# Patient Record
Sex: FEMALE | Race: BLACK OR AFRICAN AMERICAN | Employment: OTHER | URBAN - METROPOLITAN AREA
[De-identification: names, ages, dates, MRNs, and addresses within clinical notes are randomized per-mention and may not be internally consistent; named-entity substitution may affect disease eponyms.]

---

## 2022-01-15 ENCOUNTER — HOSPITAL ENCOUNTER (EMERGENCY)
Age: 62
Discharge: HOME | End: 2022-01-15
Attending: EMERGENCY MEDICINE
Payer: MEDICARE

## 2022-01-15 ENCOUNTER — APPOINTMENT (OUTPATIENT)
Dept: CT IMAGING | Age: 62
End: 2022-01-15
Payer: MEDICARE

## 2022-01-15 ENCOUNTER — APPOINTMENT (OUTPATIENT)
Dept: GENERAL RADIOLOGY | Age: 62
End: 2022-01-15
Payer: MEDICARE

## 2022-01-15 VITALS
BODY MASS INDEX: 43.4 KG/M2 | SYSTOLIC BLOOD PRESSURE: 133 MMHG | HEIGHT: 69 IN | OXYGEN SATURATION: 96 % | WEIGHT: 293 LBS | TEMPERATURE: 98.5 F | DIASTOLIC BLOOD PRESSURE: 72 MMHG | HEART RATE: 81 BPM | RESPIRATION RATE: 22 BRPM

## 2022-01-15 DIAGNOSIS — J18.9 PNEUMONIA OF RIGHT LOWER LOBE DUE TO INFECTIOUS ORGANISM: Primary | ICD-10-CM

## 2022-01-15 LAB
ABSOLUTE EOS #: 0 K/UL (ref 0–0.4)
ABSOLUTE IMMATURE GRANULOCYTE: 0.09 K/UL (ref 0–0.3)
ABSOLUTE LYMPH #: 1.03 K/UL (ref 1–4.8)
ABSOLUTE MONO #: 0.43 K/UL (ref 0.2–0.8)
ALBUMIN SERPL-MCNC: 3.8 G/DL (ref 3.5–5.2)
ALBUMIN/GLOBULIN RATIO: ABNORMAL (ref 1–2.5)
ALP BLD-CCNC: 81 U/L (ref 35–104)
ALT SERPL-CCNC: 14 U/L (ref 5–33)
ANION GAP SERPL CALCULATED.3IONS-SCNC: 15 MMOL/L (ref 9–17)
AST SERPL-CCNC: 27 U/L
ATYPICAL LYMPHOCYTE ABSOLUTE COUNT: 0.13 K/UL
ATYPICAL LYMPHOCYTES: 3 %
BASOPHILS # BLD: 0 %
BASOPHILS ABSOLUTE: 0 K/UL (ref 0–0.2)
BILIRUB SERPL-MCNC: 1.4 MG/DL (ref 0.3–1.2)
BUN BLDV-MCNC: 10 MG/DL (ref 8–23)
BUN/CREAT BLD: 13 (ref 9–20)
CALCIUM SERPL-MCNC: 8.9 MG/DL (ref 8.6–10.4)
CHLORIDE BLD-SCNC: 98 MMOL/L (ref 98–107)
CO2: 23 MMOL/L (ref 20–31)
CREAT SERPL-MCNC: 0.76 MG/DL (ref 0.5–0.9)
D-DIMER QUANTITATIVE: 1.36 MG/L FEU (ref 0–0.59)
DIFFERENTIAL TYPE: ABNORMAL
EOSINOPHILS RELATIVE PERCENT: 0 % (ref 1–4)
GFR AFRICAN AMERICAN: >60 ML/MIN
GFR NON-AFRICAN AMERICAN: >60 ML/MIN
GFR SERPL CREATININE-BSD FRML MDRD: ABNORMAL ML/MIN/{1.73_M2}
GFR SERPL CREATININE-BSD FRML MDRD: ABNORMAL ML/MIN/{1.73_M2}
GLUCOSE BLD-MCNC: 107 MG/DL (ref 70–99)
HCT VFR BLD CALC: 40.9 % (ref 36.3–47.1)
HEMOGLOBIN: 13.4 G/DL (ref 11.9–15.1)
IMMATURE GRANULOCYTES: 2 %
LIPASE: 15 U/L (ref 13–60)
LYMPHOCYTES # BLD: 24 % (ref 24–44)
MCH RBC QN AUTO: 28.8 PG (ref 25.2–33.5)
MCHC RBC AUTO-ENTMCNC: 32.8 G/DL (ref 28.4–34.8)
MCV RBC AUTO: 88 FL (ref 82.6–102.9)
MONOCYTES # BLD: 10 % (ref 1–7)
MORPHOLOGY: ABNORMAL
NRBC AUTOMATED: 0 PER 100 WBC
PDW BLD-RTO: 13.8 % (ref 11.8–14.4)
PLATELET # BLD: 201 K/UL (ref 138–453)
PLATELET ESTIMATE: ABNORMAL
PMV BLD AUTO: 9.6 FL (ref 8.1–13.5)
POTASSIUM SERPL-SCNC: 3.6 MMOL/L (ref 3.7–5.3)
RBC # BLD: 4.65 M/UL (ref 3.95–5.11)
RBC # BLD: ABNORMAL 10*6/UL
SEG NEUTROPHILS: 61 % (ref 36–66)
SEGMENTED NEUTROPHILS ABSOLUTE COUNT: 2.62 K/UL (ref 1.8–7.7)
SODIUM BLD-SCNC: 136 MMOL/L (ref 135–144)
TOTAL PROTEIN: 7.9 G/DL (ref 6.4–8.3)
TROPONIN INTERP: NORMAL
TROPONIN T: NORMAL NG/ML
TROPONIN, HIGH SENSITIVITY: 9 NG/L (ref 0–14)
WBC # BLD: 4.3 K/UL (ref 3.5–11.3)
WBC # BLD: ABNORMAL 10*3/UL

## 2022-01-15 PROCEDURE — 80053 COMPREHEN METABOLIC PANEL: CPT

## 2022-01-15 PROCEDURE — 84484 ASSAY OF TROPONIN QUANT: CPT

## 2022-01-15 PROCEDURE — 71045 X-RAY EXAM CHEST 1 VIEW: CPT

## 2022-01-15 PROCEDURE — 83690 ASSAY OF LIPASE: CPT

## 2022-01-15 PROCEDURE — 71260 CT THORAX DX C+: CPT

## 2022-01-15 PROCEDURE — 85379 FIBRIN DEGRADATION QUANT: CPT

## 2022-01-15 PROCEDURE — 99283 EMERGENCY DEPT VISIT LOW MDM: CPT

## 2022-01-15 PROCEDURE — 6360000002 HC RX W HCPCS: Performed by: PHYSICIAN ASSISTANT

## 2022-01-15 PROCEDURE — 73110 X-RAY EXAM OF WRIST: CPT

## 2022-01-15 PROCEDURE — 2580000003 HC RX 258: Performed by: PHYSICIAN ASSISTANT

## 2022-01-15 PROCEDURE — 96374 THER/PROPH/DIAG INJ IV PUSH: CPT

## 2022-01-15 PROCEDURE — 96375 TX/PRO/DX INJ NEW DRUG ADDON: CPT

## 2022-01-15 PROCEDURE — 6360000004 HC RX CONTRAST MEDICATION: Performed by: PHYSICIAN ASSISTANT

## 2022-01-15 PROCEDURE — 93005 ELECTROCARDIOGRAM TRACING: CPT | Performed by: PHYSICIAN ASSISTANT

## 2022-01-15 PROCEDURE — 85025 COMPLETE CBC W/AUTO DIFF WBC: CPT

## 2022-01-15 PROCEDURE — 96361 HYDRATE IV INFUSION ADD-ON: CPT

## 2022-01-15 RX ORDER — 0.9 % SODIUM CHLORIDE 0.9 %
1000 INTRAVENOUS SOLUTION INTRAVENOUS ONCE
Status: COMPLETED | OUTPATIENT
Start: 2022-01-15 | End: 2022-01-15

## 2022-01-15 RX ORDER — AZITHROMYCIN 250 MG/1
TABLET, FILM COATED ORAL
Qty: 1 PACKET | Refills: 0 | Status: SHIPPED | OUTPATIENT
Start: 2022-01-15 | End: 2022-01-18 | Stop reason: ALTCHOICE

## 2022-01-15 RX ORDER — ONDANSETRON 4 MG/1
4 TABLET, ORALLY DISINTEGRATING ORAL EVERY 8 HOURS PRN
Qty: 15 TABLET | Refills: 0 | Status: ON HOLD | OUTPATIENT
Start: 2022-01-15 | End: 2022-03-04

## 2022-01-15 RX ORDER — 0.9 % SODIUM CHLORIDE 0.9 %
80 INTRAVENOUS SOLUTION INTRAVENOUS ONCE
Status: COMPLETED | OUTPATIENT
Start: 2022-01-15 | End: 2022-01-15

## 2022-01-15 RX ORDER — DEXAMETHASONE 6 MG/1
6 TABLET ORAL DAILY
Qty: 7 TABLET | Refills: 0 | Status: SHIPPED | OUTPATIENT
Start: 2022-01-15 | End: 2022-01-22

## 2022-01-15 RX ORDER — ONDANSETRON 2 MG/ML
4 INJECTION INTRAMUSCULAR; INTRAVENOUS ONCE
Status: COMPLETED | OUTPATIENT
Start: 2022-01-15 | End: 2022-01-15

## 2022-01-15 RX ORDER — KETOROLAC TROMETHAMINE 30 MG/ML
30 INJECTION, SOLUTION INTRAMUSCULAR; INTRAVENOUS ONCE
Status: COMPLETED | OUTPATIENT
Start: 2022-01-15 | End: 2022-01-15

## 2022-01-15 RX ORDER — SODIUM CHLORIDE 0.9 % (FLUSH) 0.9 %
10 SYRINGE (ML) INJECTION PRN
Status: DISCONTINUED | OUTPATIENT
Start: 2022-01-15 | End: 2022-01-15 | Stop reason: HOSPADM

## 2022-01-15 RX ORDER — ALBUTEROL SULFATE 90 UG/1
2 AEROSOL, METERED RESPIRATORY (INHALATION) EVERY 6 HOURS PRN
Qty: 18 G | Refills: 0 | Status: SHIPPED | OUTPATIENT
Start: 2022-01-15

## 2022-01-15 RX ADMIN — ONDANSETRON 4 MG: 2 INJECTION INTRAMUSCULAR; INTRAVENOUS at 14:23

## 2022-01-15 RX ADMIN — SODIUM CHLORIDE 1000 ML: 9 INJECTION, SOLUTION INTRAVENOUS at 14:23

## 2022-01-15 RX ADMIN — IOPAMIDOL 75 ML: 755 INJECTION, SOLUTION INTRAVENOUS at 16:53

## 2022-01-15 RX ADMIN — KETOROLAC TROMETHAMINE 30 MG: 30 INJECTION, SOLUTION INTRAMUSCULAR; INTRAVENOUS at 14:23

## 2022-01-15 RX ADMIN — SODIUM CHLORIDE 80 ML: 9 INJECTION, SOLUTION INTRAVENOUS at 16:53

## 2022-01-15 RX ADMIN — SODIUM CHLORIDE, PRESERVATIVE FREE 10 ML: 5 INJECTION INTRAVENOUS at 16:53

## 2022-01-15 ASSESSMENT — PAIN DESCRIPTION - LOCATION: LOCATION: ABDOMEN;GENERALIZED

## 2022-01-15 ASSESSMENT — PAIN SCALES - GENERAL
PAINLEVEL_OUTOF10: 8
PAINLEVEL_OUTOF10: 4
PAINLEVEL_OUTOF10: 8

## 2022-01-15 ASSESSMENT — ENCOUNTER SYMPTOMS
NAUSEA: 1
COUGH: 1

## 2022-01-15 ASSESSMENT — PAIN DESCRIPTION - PAIN TYPE: TYPE: ACUTE PAIN

## 2022-01-15 NOTE — ED NOTES
Pt returned to room 7 from CT scan via stretcher. Accompanied by CT tech.      Gianni Pyle RN  01/15/22 4690

## 2022-01-15 NOTE — ED NOTES
Pt came in with c/o abd pain, fatigue, generalized weakness and body aches. Pt reports abd pain began Tuesday. Pt states she has not been able to eat since Tuesday. Pt reports N/V and diarrhea with abd pain. Pt states she had a covid test done yesterday, results pending.       Maris Aranda RN  01/15/22 0886

## 2022-01-15 NOTE — ED PROVIDER NOTES
eMERGENCY dEPARTMENT eNCOUnter   Independent Attestation     Pt Name: Fredy Sam  MRN: [id number]  [place] 1960  Date of evaluation: 1/15/22     Fredy Sam is a 60 y.o. female with CC: Abdominal Pain (Onset yesterday. ) and Concern For COVID-19 (tested yesterday, no results yet. )      This visit was performed by both a physician and an APC. I performed all aspects of the MDM as documented. I was personally available for consultation. Per review of the medical chart care appears to be appropriate. The care is provided during an unprecedented national emergency due to the novel coronavirus, COVID 19.     Jeannette Hinton DO  Attending Emergency Physician                    Gayle Lazaro 1721,   01/15/22 6610

## 2022-01-15 NOTE — ED NOTES
Pt transported via wheelchair to CT scan. Accompanied by transport vivi.       Ida Burdick RN  01/15/22 2256

## 2022-01-15 NOTE — ED NOTES
Pt given boxed meal and ice water. Pt denies any needs or concerns at this time.       Usman Rice RN  01/15/22 2516

## 2022-01-15 NOTE — ED PROVIDER NOTES
[address] [place] [address] ED  eMERGENCY dEPARTMENT eNCOUnter      Pt Name: Roberto Carlos Negrete  MRN: [id number]  [place] 1960  Date of evaluation: 1/15/22      CHIEF COMPLAINT       Chief Complaint   Patient presents with    Abdominal Pain     Onset yesterday.  Concern For COVID-19     tested yesterday, no results yet. HISTORY OF PRESENT ILLNESS    Roberto Carlos Negrete is a 60 y.o. female who presents complaining of body aches congestion cough. She did take a COVID test yesterday but she has not got her results back yet she does not have any abdominal pain she had some nausea upon arrival  The history is provided by the patient. URI  Presenting symptoms: congestion, cough, fatigue and fever    Severity:  Moderate  Onset quality:  Gradual  Duration:  2 days  Timing:  Intermittent  Progression:  Waxing and waning  Chronicity:  New  Relieved by:  Nothing  Worsened by:  Nothing  Ineffective treatments:  None tried  Associated symptoms: myalgias        REVIEW OF SYSTEMS       Review of Systems   Constitutional: Positive for fatigue and fever. HENT: Positive for congestion. Respiratory: Positive for cough. Gastrointestinal: Positive for nausea. Musculoskeletal: Positive for myalgias. All other systems reviewed and are negative.       PAST MEDICAL HISTORY     Past Medical History:   Diagnosis Date    GERD (gastroesophageal reflux disease)     Hyperplastic polyp of intestine     Hypertension     Metabolic syndrome     PRE DIABETIC    Obesity        SURGICAL HISTORY       Past Surgical History:   Procedure Laterality Date    BUNIONECTOMY Bilateral     COLONOSCOPY  5/28/15    BX RECTAL POLYP,HYPERPLASTIC POLYP    KNEE ARTHROSCOPY Right        CURRENT MEDICATIONS       Previous Medications    AMLODIPINE (NORVASC) 5 MG TABLET    Take 5 mg by mouth daily    IBUPROFEN (ADVIL;MOTRIN) 200 MG TABLET    Take 200 mg by mouth every 6 hours as needed for Pain    MULTIPLE VITAMINS-MINERALS (THERAPEUTIC MULTIVITAMIN-MINERALS) TABLET    Take 1 tablet by mouth daily    OMEPRAZOLE (PRILOSEC) 10 MG CAPSULE    Take 10 mg by mouth daily       ALLERGIES     has No Known Allergies. FAMILY HISTORY     has no family status information on file. SOCIAL HISTORY      reports that she has never smoked. She does not have any smokeless tobacco history on file. She reports that she does not drink alcohol and does not use drugs. PHYSICAL EXAM     INITIAL VITALS: /72   Pulse 81   Temp 98.5 °F (36.9 °C) (Oral)   Resp 22   Ht 5' 9\" (1.753 m)   Wt (!) 310 lb (140.6 kg)   LMP 02/28/2015 (Approximate)   SpO2 96%   BMI 45.78 kg/m²      Physical Exam  Vitals and nursing note reviewed. Constitutional:       Appearance: She is well-developed. HENT:      Head: Normocephalic and atraumatic. Mouth/Throat:      Mouth: Mucous membranes are moist.   Eyes:      Extraocular Movements: Extraocular movements intact. Pupils: Pupils are equal, round, and reactive to light. Cardiovascular:      Rate and Rhythm: Normal rate and regular rhythm. Heart sounds: Normal heart sounds. No murmur heard. Pulmonary:      Effort: Pulmonary effort is normal.      Breath sounds: Normal breath sounds. Abdominal:      General: Bowel sounds are normal.      Palpations: Abdomen is soft. Tenderness: There is no abdominal tenderness. Musculoskeletal:         General: Normal range of motion. Cervical back: Normal range of motion. Skin:     General: Skin is warm and dry. Neurological:      Mental Status: She is alert and oriented to person, place, and time. MEDICAL DECISION MAKING:     On reexam she is feeling much better she is given a box meal and water she does not have any more nausea body aches have resolved after treatment. Discussed CT results x-ray results and lab results with the patient.   Recommend follow-up for COVID testing results Tylenol Motrin for any pain or fever we will write for nausea medication and steroid for the next few days. She should follow-up with her primary care provider for recheck in 1 to 2 days and she should return to the emergency department if her symptoms worsen or if she has any other concerns. Likely COVID but there is right side infiltrate we will treat for pneumonia. DIAGNOSTIC RESULTS     EKG: All EKG's are interpreted by the Emergency Department Physician who either signs or Co-signs this chart in the absence of acardiologist.        RADIOLOGY:Allplain film, CT, MRI, and formal ultrasound images (except ED bedside ultrasound) are read by the radiologist and the images and interpretations are directly viewed by the emergency physician. LABS:All lab results were reviewed by myself, and all abnormals are listed below.   Labs Reviewed   CBC WITH AUTO DIFFERENTIAL - Abnormal; Notable for the following components:       Result Value    Monocytes 10 (*)     Eosinophils % 0 (*)     Immature Granulocytes 2 (*)     All other components within normal limits   COMPREHENSIVE METABOLIC PANEL W/ REFLEX TO MG FOR LOW K - Abnormal; Notable for the following components:    Glucose 107 (*)     Potassium 3.6 (*)     Total Bilirubin 1.40 (*)     All other components within normal limits   D-DIMER, QUANTITATIVE - Abnormal; Notable for the following components:    D-Dimer, Quant 1.36 (*)     All other components within normal limits   TROPONIN   LIPASE   URINE RT REFLEX TO CULTURE         EMERGENCY DEPARTMENT COURSE:   Vitals:    Vitals:    01/15/22 1430 01/15/22 1530 01/15/22 1630 01/15/22 1757   BP: 131/73 126/70 127/72 133/72   Pulse:    81   Resp:    22   Temp:    98.5 °F (36.9 °C)   TempSrc:    Oral   SpO2: 97% 95% 94% 96%   Weight:       Height:           The patient was given the following medications while in the emergency department:  Orders Placed This Encounter   Medications    0.9 % sodium chloride bolus    ondansetron (ZOFRAN) injection 4 mg    ketorolac (TORADOL) injection 30 mg    0.9 % sodium chloride bolus    sodium chloride flush 0.9 % injection 10 mL    iopamidol (ISOVUE-370) 76 % injection 75 mL    azithromycin (ZITHROMAX) 250 MG tablet     Sig: Take 2 tablets (500 mg) on Day 1, followed by 1 tablet (250 mg) once daily on Days 2 through 5. Dispense:  1 packet     Refill:  0    dexamethasone (DECADRON) 6 MG tablet     Sig: Take 1 tablet by mouth daily for 7 days     Dispense:  7 tablet     Refill:  0    albuterol sulfate HFA (PROAIR HFA) 108 (90 Base) MCG/ACT inhaler     Sig: Inhale 2 puffs into the lungs every 6 hours as needed for Wheezing     Dispense:  18 g     Refill:  0    ondansetron (ZOFRAN ODT) 4 MG disintegrating tablet     Sig: Take 1 tablet by mouth every 8 hours as needed for Nausea or Vomiting     Dispense:  15 tablet     Refill:  0       -------------------------      CRITICAL CARE:    CONSULTS:  None    PROCEDURES:  Procedures    FINAL IMPRESSION      1. Pneumonia of right lower lobe due to infectious organism          DISPOSITION/PLAN   DISPOSITION Decision To Discharge 01/15/2022 06:32:53 PM      PATIENT REFERREDTO:  [address] [place] [address]  [address] [address]  Schedule an appointment as soon as possible for a visit in 2 days      [facility] ED  [address] [facility]  [phone]    If symptoms worsen      DISCHARGEMEDICATIONS:  New Prescriptions    ALBUTEROL SULFATE HFA (PROAIR HFA) 108 (90 BASE) MCG/ACT INHALER    Inhale 2 puffs into the lungs every 6 hours as needed for Wheezing    AZITHROMYCIN (ZITHROMAX) 250 MG TABLET    Take 2 tablets (500 mg) on Day 1, followed by 1 tablet (250 mg) once daily on Days 2 through 5.     DEXAMETHASONE (DECADRON) 6 MG TABLET    Take 1 tablet by mouth daily for 7 days    ONDANSETRON (ZOFRAN ODT) 4 MG DISINTEGRATING TABLET    Take 1 tablet by mouth every 8 hours as needed for Nausea or Vomiting       (Please note that portions of this note were completed with a voice recognition program.  Efforts were made to edit thedictations but occasionally words are mis-transcribed.)    TAHIR Azar PA-C  01/15/22 4310

## 2022-01-18 ENCOUNTER — TELEMEDICINE (OUTPATIENT)
Dept: PRIMARY CARE CLINIC | Age: 62
End: 2022-01-18
Payer: MEDICARE

## 2022-01-18 DIAGNOSIS — Z12.31 ENCOUNTER FOR SCREENING MAMMOGRAM FOR MALIGNANT NEOPLASM OF BREAST: ICD-10-CM

## 2022-01-18 DIAGNOSIS — Z80.0 FAMILY HISTORY OF COLON CANCER REQUIRING SCREENING COLONOSCOPY: ICD-10-CM

## 2022-01-18 DIAGNOSIS — Z76.89 ENCOUNTER TO ESTABLISH CARE: Primary | ICD-10-CM

## 2022-01-18 DIAGNOSIS — J12.82 PNEUMONIA DUE TO COVID-19 VIRUS: ICD-10-CM

## 2022-01-18 DIAGNOSIS — U07.1 PNEUMONIA DUE TO COVID-19 VIRUS: ICD-10-CM

## 2022-01-18 LAB
EKG ATRIAL RATE: 83 BPM
EKG P AXIS: 48 DEGREES
EKG P-R INTERVAL: 156 MS
EKG Q-T INTERVAL: 400 MS
EKG QRS DURATION: 130 MS
EKG QTC CALCULATION (BAZETT): 470 MS
EKG R AXIS: 57 DEGREES
EKG T AXIS: 8 DEGREES
EKG VENTRICULAR RATE: 83 BPM

## 2022-01-18 PROCEDURE — G8427 DOCREV CUR MEDS BY ELIG CLIN: HCPCS | Performed by: PHYSICIAN ASSISTANT

## 2022-01-18 PROCEDURE — 93010 ELECTROCARDIOGRAM REPORT: CPT | Performed by: INTERNAL MEDICINE

## 2022-01-18 PROCEDURE — 3017F COLORECTAL CA SCREEN DOC REV: CPT | Performed by: PHYSICIAN ASSISTANT

## 2022-01-18 PROCEDURE — 99203 OFFICE O/P NEW LOW 30 MIN: CPT | Performed by: PHYSICIAN ASSISTANT

## 2022-01-18 SDOH — ECONOMIC STABILITY: FOOD INSECURITY: WITHIN THE PAST 12 MONTHS, YOU WORRIED THAT YOUR FOOD WOULD RUN OUT BEFORE YOU GOT MONEY TO BUY MORE.: NEVER TRUE

## 2022-01-18 SDOH — ECONOMIC STABILITY: FOOD INSECURITY: WITHIN THE PAST 12 MONTHS, THE FOOD YOU BOUGHT JUST DIDN'T LAST AND YOU DIDN'T HAVE MONEY TO GET MORE.: NEVER TRUE

## 2022-01-18 SDOH — ECONOMIC STABILITY: TRANSPORTATION INSECURITY
IN THE PAST 12 MONTHS, HAS LACK OF TRANSPORTATION KEPT YOU FROM MEETINGS, WORK, OR FROM GETTING THINGS NEEDED FOR DAILY LIVING?: NO

## 2022-01-18 SDOH — ECONOMIC STABILITY: TRANSPORTATION INSECURITY
IN THE PAST 12 MONTHS, HAS THE LACK OF TRANSPORTATION KEPT YOU FROM MEDICAL APPOINTMENTS OR FROM GETTING MEDICATIONS?: NO

## 2022-01-18 ASSESSMENT — PATIENT HEALTH QUESTIONNAIRE - PHQ9
SUM OF ALL RESPONSES TO PHQ QUESTIONS 1-9: 0
SUM OF ALL RESPONSES TO PHQ QUESTIONS 1-9: 0
SUM OF ALL RESPONSES TO PHQ9 QUESTIONS 1 & 2: 0
SUM OF ALL RESPONSES TO PHQ QUESTIONS 1-9: 0
2. FEELING DOWN, DEPRESSED OR HOPELESS: 0
SUM OF ALL RESPONSES TO PHQ QUESTIONS 1-9: 0
1. LITTLE INTEREST OR PLEASURE IN DOING THINGS: 0

## 2022-01-18 ASSESSMENT — ENCOUNTER SYMPTOMS
COUGH: 1
SINUS PAIN: 0
DIARRHEA: 1
SHORTNESS OF BREATH: 0
SINUS PRESSURE: 0
BACK PAIN: 0
ABDOMINAL PAIN: 1

## 2022-01-18 ASSESSMENT — SOCIAL DETERMINANTS OF HEALTH (SDOH): HOW HARD IS IT FOR YOU TO PAY FOR THE VERY BASICS LIKE FOOD, HOUSING, MEDICAL CARE, AND HEATING?: NOT HARD AT ALL

## 2022-01-18 NOTE — PROGRESS NOTES
2022    TELEHEALTH EVALUATION -- Audio/Visual (During XSEPH-91 public health emergency)    HPI:    Mariel Mendiola (:  1960) has requested an audio/video evaluation for the following concern(s):    Patient presents as a virtual visit to establish care. No recent PCP. Primary concern is following up after ED visit. She was diagnosed with COVID-pneumonia and sent home with Z-Jhonathan, Decadron, Zofran. I reviewed ED visit including imaging and notes. Patient reports that her symptoms started on . She describes fatigue, chills, congestion, cough, abdominal pain, diarrhea. She reports since completing antibiotics from ER she has noticed slight change in diarrhea and it is off colored/green. She does report that remainder of COVID symptoms are gradually improving. Denies persistent shortness of breath, chest pain, lightheadedness, dizziness, syncope. Patient is behind on several health maintenance screenings. This includes colon study and mammogram.  Patient has not had screening labs in a few years. Review of Systems   Constitutional: Positive for chills and fatigue. Negative for fever. HENT: Positive for congestion. Negative for sinus pressure and sinus pain. Respiratory: Positive for cough. Negative for shortness of breath. Cardiovascular: Negative for chest pain and leg swelling. Gastrointestinal: Positive for abdominal pain and diarrhea. Genitourinary: Negative for difficulty urinating, flank pain, frequency and hematuria. Musculoskeletal: Negative for arthralgias and back pain. Neurological: Negative for dizziness and headaches. Psychiatric/Behavioral: Negative for agitation. Prior to Visit Medications    Medication Sig Taking?  Authorizing Provider   metoprolol tartrate (LOPRESSOR) 25 MG tablet Take 25 mg by mouth 2 times daily Yes Historical Provider, MD   dexamethasone (DECADRON) 6 MG tablet Take 1 tablet by mouth daily for 7 days Yes Willi Keenan PANIKA   albuterol sulfate HFA (PROAIR HFA) 108 (90 Base) MCG/ACT inhaler Inhale 2 puffs into the lungs every 6 hours as needed for Wheezing Yes Lotus Mendoza TAHIR   ondansetron (ZOFRAN ODT) 4 MG disintegrating tablet Take 1 tablet by mouth every 8 hours as needed for Nausea or Vomiting Yes Lotus MendozaTAHIR   ibuprofen (ADVIL;MOTRIN) 200 MG tablet Take 200 mg by mouth every 6 hours as needed for Pain Yes Historical Provider, MD       Social History     Tobacco Use    Smoking status: Never Smoker    Smokeless tobacco: Never Used   Vaping Use    Vaping Use: Never used   Substance Use Topics    Alcohol use: No    Drug use: No        No Known Allergies,   Past Medical History:   Diagnosis Date    GERD (gastroesophageal reflux disease)     Hyperplastic polyp of intestine     Hypertension     Metabolic syndrome     PRE DIABETIC    Obesity    ,   Past Surgical History:   Procedure Laterality Date    BUNIONECTOMY Bilateral     COLONOSCOPY  5/28/15    BX RECTAL POLYP,HYPERPLASTIC POLYP    KNEE ARTHROSCOPY Right    ,   Social History     Tobacco Use    Smoking status: Never Smoker    Smokeless tobacco: Never Used   Vaping Use    Vaping Use: Never used   Substance Use Topics    Alcohol use: No    Drug use: No   , History reviewed. No pertinent family history. ,   Immunization History   Administered Date(s) Administered    COVID-19, Pfizer Purple top, DILUTE for use, 12+ yrs, 30mcg/0.3mL dose 02/27/2021, 03/20/2021       PHYSICAL EXAMINATION:  [ INSTRUCTIONS:  \"[x]\" Indicates a positive item  \"[]\" Indicates a negative item  -- DELETE ALL ITEMS NOT EXAMINED]  Vital Signs: (As obtained by patient/caregiver or practitioner observation)    Constitutional: [x] Appears well-developed and well-nourished [x] No apparent distress      [] Abnormal-   Mental status  [x] Alert and awake  [x] Oriented to person/place/time [x]Able to follow commands      Eyes:  EOM    [x]  Normal  [] Abnormal-  Sclera  [x]  Normal  [] Abnormal -         Discharge [x]  None visible  [] Abnormal -    HENT:   [x] Normocephalic, atraumatic. [] Abnormal   [x] Mouth/Throat: Mucous membranes are moist.     External Ears [x] Normal  [] Abnormal-     Neck: [x] No visualized mass     Pulmonary/Chest: [x] Respiratory effort normal.  [x] No visualized signs of difficulty breathing or respiratory distress        [] Abnormal-      Musculoskeletal:    [x] Normal range of motion of neck        [] Abnormal-       Neurological:        [x] No Facial Asymmetry (Cranial nerve 7 motor function) (limited exam to video visit)          [x] No gaze palsy        [] Abnormal-         Skin:        [x] No significant exanthematous lesions or discoloration noted on facial skin         [] Abnormal-            Psychiatric:       [x] Normal Affect [x] No Hallucinations        [] Abnormal-     Other pertinent observable physical exam findings-     ASSESSMENT/PLAN:  1. Encounter to establish care  Initial visit to establish care. 2. Pneumonia due to COVID-19 virus  Patient advised on continuing/completing medications as prescribed by ED. We discussed that antibiotics can upset GI doris. Typically this will resolve on its own. I advised staying well-hydrated, bland diet. Advised on over-the-counter medications as needed for residual symptoms. However return in 1 week for recheck. 3. Encounter for screening mammogram for malignant neoplasm of breast  - [facility] DIGITAL SCREEN W OR WO CAD BILATERAL; Future    4. Family history of colon cancer requiring screening colonoscopy  - Dakota Manzo MD, Gastroenterology, [place]      Return in about 1 week (around 1/25/2022) for recheck symptoms. Adam Amy, was evaluated through a synchronous (real-time) audio-video encounter. The patient (or guardian if applicable) is aware that this is a billable service, which includes applicable co-pays.  This Virtual Visit was conducted with patient's (and/or legal guardian's) consent. The visit was conducted pursuant to the emergency declaration under the [facility], [address] [place] [address] authority and the [employer] General Act. Patient identification was verified, and a caregiver was present when appropriate. The patient was located in a state where the provider was licensed to provide care. Total time spent on this encounter: Not billed by time    --Kati Gagnon PA-C on 1/19/2022 at 6:50 PM    An electronic signature was used to authenticate this note.

## 2022-01-29 ENCOUNTER — HOSPITAL ENCOUNTER (OUTPATIENT)
Dept: MAMMOGRAPHY | Age: 62
Discharge: HOME | End: 2022-01-31
Payer: MEDICARE

## 2022-01-29 DIAGNOSIS — Z12.31 ENCOUNTER FOR SCREENING MAMMOGRAM FOR MALIGNANT NEOPLASM OF BREAST: ICD-10-CM

## 2022-01-29 PROCEDURE — 77067 SCR MAMMO BI INCL CAD: CPT

## 2022-01-31 DIAGNOSIS — N63.20 BREAST MASS, LEFT: Primary | ICD-10-CM

## 2022-02-07 ENCOUNTER — HOSPITAL ENCOUNTER (OUTPATIENT)
Dept: ULTRASOUND IMAGING | Age: 62
Discharge: HOME | End: 2022-02-09
Payer: MEDICARE

## 2022-02-07 DIAGNOSIS — N63.20 BREAST MASS, LEFT: ICD-10-CM

## 2022-02-07 DIAGNOSIS — N63.20 BREAST MASS, LEFT: Primary | ICD-10-CM

## 2022-02-07 PROCEDURE — 76642 ULTRASOUND BREAST LIMITED: CPT

## 2022-02-10 ENCOUNTER — HOSPITAL ENCOUNTER (OUTPATIENT)
Dept: MAMMOGRAPHY | Age: 62
Discharge: HOME | End: 2022-02-12
Payer: MEDICARE

## 2022-02-10 VITALS — SYSTOLIC BLOOD PRESSURE: 122 MMHG | DIASTOLIC BLOOD PRESSURE: 65 MMHG | HEART RATE: 71 BPM

## 2022-02-10 DIAGNOSIS — N63.20 BREAST MASS, LEFT: ICD-10-CM

## 2022-02-10 DIAGNOSIS — R92.8 ABNORMAL MAMMOGRAM: ICD-10-CM

## 2022-02-10 PROCEDURE — A4648 IMPLANTABLE TISSUE MARKER: HCPCS

## 2022-02-10 PROCEDURE — 88342 IMHCHEM/IMCYTCHM 1ST ANTB: CPT

## 2022-02-10 PROCEDURE — 88305 TISSUE EXAM BY PATHOLOGIST: CPT

## 2022-02-10 PROCEDURE — 77065 DX MAMMO INCL CAD UNI: CPT

## 2022-02-10 PROCEDURE — 2500000003 HC RX 250 WO HCPCS

## 2022-02-11 ENCOUNTER — TELEPHONE (OUTPATIENT)
Dept: GASTROENTEROLOGY | Age: 62
End: 2022-02-11

## 2022-02-11 DIAGNOSIS — Z87.19 HISTORY OF RECTAL POLYPS: Primary | ICD-10-CM

## 2022-02-11 DIAGNOSIS — Z80.0 FAMILY HISTORY OF COLON CANCER: ICD-10-CM

## 2022-02-11 LAB — SURGICAL PATHOLOGY REPORT: NORMAL

## 2022-02-11 RX ORDER — POLYETHYLENE GLYCOL 3350 17 G/17G
POWDER, FOR SOLUTION ORAL
Qty: 238 G | Refills: 0 | Status: SHIPPED | OUTPATIENT
Start: 2022-02-11

## 2022-02-11 NOTE — TELEPHONE ENCOUNTER
Writer spoke to pt over the phone in regards to scheduling colon GI referral/recall. After going through colon screen questionnaire w/ pt, she is sched for colon w/ Jacob at [facility] Fri 3/4/22 @ 9:30am proc time, 8am arrival time. 2 Mag Citrate, Miralax & Dulcolax orders will be sent to [facility] on [address]. Bowel prep instructions given to pt over the phone and hard [facility]'s [facility]. Pt instructed she will need a  and to bring proof of Covid-19 vaccs on proc day. Pt voices her understanding.

## 2022-02-15 DIAGNOSIS — N63.20 BREAST MASS, LEFT: Primary | ICD-10-CM

## 2022-02-17 ENCOUNTER — OFFICE VISIT (OUTPATIENT)
Dept: PRIMARY CARE CLINIC | Age: 62
End: 2022-02-17
Payer: MEDICARE

## 2022-02-17 VITALS
BODY MASS INDEX: 43.4 KG/M2 | WEIGHT: 293 LBS | SYSTOLIC BLOOD PRESSURE: 120 MMHG | OXYGEN SATURATION: 95 % | RESPIRATION RATE: 16 BRPM | HEIGHT: 69 IN | HEART RATE: 71 BPM | DIASTOLIC BLOOD PRESSURE: 78 MMHG

## 2022-02-17 DIAGNOSIS — M25.561 CHRONIC PAIN OF BOTH KNEES: ICD-10-CM

## 2022-02-17 DIAGNOSIS — Z13.29 SCREENING FOR THYROID DISORDER: ICD-10-CM

## 2022-02-17 DIAGNOSIS — G89.29 CHRONIC PAIN OF BOTH KNEES: ICD-10-CM

## 2022-02-17 DIAGNOSIS — Z13.220 ENCOUNTER FOR LIPID SCREENING FOR CARDIOVASCULAR DISEASE: ICD-10-CM

## 2022-02-17 DIAGNOSIS — E66.01 MORBID OBESITY WITH BMI OF 40.0-44.9, ADULT (HCC): ICD-10-CM

## 2022-02-17 DIAGNOSIS — I10 PRIMARY HYPERTENSION: Primary | ICD-10-CM

## 2022-02-17 DIAGNOSIS — Z13.1 SCREENING FOR DIABETES MELLITUS: ICD-10-CM

## 2022-02-17 DIAGNOSIS — E55.9 VITAMIN D DEFICIENCY: ICD-10-CM

## 2022-02-17 DIAGNOSIS — M25.562 CHRONIC PAIN OF BOTH KNEES: ICD-10-CM

## 2022-02-17 DIAGNOSIS — Z13.6 ENCOUNTER FOR LIPID SCREENING FOR CARDIOVASCULAR DISEASE: ICD-10-CM

## 2022-02-17 DIAGNOSIS — Z12.4 SCREENING FOR CERVICAL CANCER: ICD-10-CM

## 2022-02-17 PROCEDURE — 3017F COLORECTAL CA SCREEN DOC REV: CPT | Performed by: PHYSICIAN ASSISTANT

## 2022-02-17 PROCEDURE — G8417 CALC BMI ABV UP PARAM F/U: HCPCS | Performed by: PHYSICIAN ASSISTANT

## 2022-02-17 PROCEDURE — G8427 DOCREV CUR MEDS BY ELIG CLIN: HCPCS | Performed by: PHYSICIAN ASSISTANT

## 2022-02-17 PROCEDURE — G8484 FLU IMMUNIZE NO ADMIN: HCPCS | Performed by: PHYSICIAN ASSISTANT

## 2022-02-17 PROCEDURE — 1036F TOBACCO NON-USER: CPT | Performed by: PHYSICIAN ASSISTANT

## 2022-02-17 PROCEDURE — 99214 OFFICE O/P EST MOD 30 MIN: CPT | Performed by: PHYSICIAN ASSISTANT

## 2022-02-17 ASSESSMENT — ENCOUNTER SYMPTOMS
COUGH: 0
DIARRHEA: 0
ABDOMINAL PAIN: 0
NAUSEA: 0
SHORTNESS OF BREATH: 0
SINUS PAIN: 0
CONSTIPATION: 0
RHINORRHEA: 0
BACK PAIN: 0
VOMITING: 0

## 2022-02-17 NOTE — PROGRESS NOTES
[address] [facility] PRIMARY CARE  [facility] [address] [facility] [address]  [address] [address]  Dept: [phone]  Dept Fax: [phone]    Caden Carrillo is a 60 y.o. female who presents today for her medical conditions/complaints as noted below. Chief Complaint   Patient presents with    Medication Refill     metoprolol for hypertension, pt having weakness of legs and feet noticed since 2021       HPI:     Patient presents to the office for follow-up status post Covid infection. She presented to ED in January with shortness of breath, cough, upper respiratory concern. She was found to have right-sided pneumonia consistent with Covid pneumonia. She completed medications as prescribed. She reports she is feeling significantly better without any residual symptoms. Denies persistent cough, shortness of breath, chest pain, fevers, chills. Today, primary concern is bilateral lower extremity pain. She describes pain concentrated around bilateral knees. She states that this pain sometimes causes weakness of the legs. The pain is exacerbated with standing, walking, bending. Denies significant numbness or tingling. She does use Tylenol and ibuprofen over-the-counter. She has history of right knee arthroscopy in the past.  No recent knee exam/x-rays. No current orthopedic specialist.    Patient also requests refill for metoprolol that she has been on for hypertension. She reports prior PCP had her on this for several years. Denies any side effects or concerns. BP stable. Weight stable, but elevated. I reviewed with patient her allergies, medications, past medical history, surgical history, family history, and social history. She has not had screening labs in several years. No current gynecologist.  We have recently worked up mammogram she had concern for mass. After biopsy there is no evidence of malignancy.   She will begin breast MRI and going to high breast cancer clinic. No results found for: LABA1C          ( goal A1C is < 7)   No results found for: LABMICR  No results found for: LDLCHOLESTEROL, LDLCALC    (goal LDL is <100)   AST (U/L)   Date Value   01/15/2022 27     ALT (U/L)   Date Value   01/15/2022 14     BUN (mg/dL)   Date Value   01/15/2022 10     BP Readings from Last 3 Encounters:   02/17/22 120/78   02/10/22 122/65   01/15/22 133/72          (goal 120/80)    Past Medical History:   Diagnosis Date    GERD (gastroesophageal reflux disease)     Hyperplastic polyp of intestine     Hypertension     Metabolic syndrome     PRE DIABETIC    Obesity       Past Surgical History:   Procedure Laterality Date    BUNIONECTOMY Bilateral     COLONOSCOPY  5/28/15    BX RECTAL POLYP,HYPERPLASTIC POLYP    KNEE ARTHROSCOPY Right     [facility]ER[facility] LOC BREAST BIOPSY LEFT Left 2/10/2022    ROSE STEROTACTIC LOC BREAST BIOPSY LEFT 2/10/2022 [facility]       Family History   Problem Relation Age of Onset    Breast Cancer Mother     Breast Cancer Paternal Aunt     Breast Cancer Paternal Aunt     Breast Cancer Paternal Aunt     Breast Cancer Paternal Aunt     Breast Cancer Paternal Aunt     Breast Cancer Paternal Aunt        Social History     Tobacco Use    Smoking status: Never Smoker    Smokeless tobacco: Never Used   Substance Use Topics    Alcohol use: No      Current Outpatient Medications   Medication Sig Dispense Refill    metoprolol tartrate (LOPRESSOR) 25 MG tablet Take 1 tablet by mouth 2 times daily 60 tablet 2    bisacodyl (DULCOLAX) 5 MG EC tablet TAKE 4 TABS AT 10 AM THE DAY PRIOR TO COLONOSCOPY 4 tablet 0    polyethylene glycol (GLYCOLAX) 17 GM/SCOOP powder Use as directed by following your patient instructions given by office.  238 g 0    albuterol sulfate HFA (PROAIR HFA) 108 (90 Base) MCG/ACT inhaler Inhale 2 puffs into the lungs every 6 hours as needed for Wheezing 18 g 0    ondansetron (ZOFRAN ODT) 4 MG disintegrating tablet Take 1 tablet by mouth every 8 hours as needed for Nausea or Vomiting 15 tablet 0    ibuprofen (ADVIL;MOTRIN) 200 MG tablet Take 200 mg by mouth every 6 hours as needed for Pain      magnesium citrate solution Take 592 mLs by mouth once for 1 dose Pt needs two bottles of Magnesium Citrate 592 mL 0     No current facility-administered medications for this visit. No Known Allergies    Health Maintenance   Topic Date Due    Hepatitis C screen  Never done    HIV screen  Never done    Cervical cancer screen  Never done    Diabetes screen  Never done    Lipid screen  Never done    Shingles Vaccine (1 of 2) Never done    Colorectal Cancer Screen  05/28/2018    COVID-19 Vaccine (3 - Booster for Pfizer series) 08/20/2021    Flu vaccine (1) 02/17/2023 (Originally 9/1/2021)    Depression Screen  01/18/2023    Breast cancer screen  02/10/2023    DTaP/Tdap/Td vaccine (2 - Td or Tdap) 12/04/2028    Hepatitis A vaccine  Aged Out    Hepatitis B vaccine  Aged Out    Hib vaccine  Aged Out    Meningococcal (ACWY) vaccine  Aged Out    Pneumococcal 0-64 years Vaccine  Aged Out       Subjective:      Review of Systems   Constitutional: Negative for chills, fatigue and fever. HENT: Negative for congestion, rhinorrhea and sinus pain. Respiratory: Negative for cough and shortness of breath. Cardiovascular: Negative for chest pain and leg swelling. Gastrointestinal: Negative for abdominal pain, constipation, diarrhea, nausea and vomiting. Genitourinary: Negative for difficulty urinating, frequency and urgency. Musculoskeletal: Positive for arthralgias. Negative for back pain, gait problem and myalgias. Neurological: Positive for weakness (subjective). Negative for dizziness and headaches. Psychiatric/Behavioral: Negative for confusion, dysphoric mood and sleep disturbance. The patient is not nervous/anxious. All other systems reviewed and are negative.       Objective:     Physical Exam  Vitals and nursing note reviewed. Constitutional:       General: She is not in acute distress. Appearance: Normal appearance. HENT:      Head: Normocephalic. Mouth/Throat:      Mouth: Mucous membranes are moist.   Eyes:      Extraocular Movements: Extraocular movements intact. Conjunctiva/sclera: Conjunctivae normal.      Pupils: Pupils are equal, round, and reactive to light. Cardiovascular:      Rate and Rhythm: Normal rate and regular rhythm. Pulses: Normal pulses. Heart sounds: Normal heart sounds. Pulmonary:      Effort: Pulmonary effort is normal.      Breath sounds: Normal breath sounds. Abdominal:      General: Abdomen is flat. Bowel sounds are normal.      Palpations: Abdomen is soft. Tenderness: There is no abdominal tenderness. Musculoskeletal:      Cervical back: Normal range of motion. Right knee: No effusion, erythema or ecchymosis. Tenderness present over the medial joint line and lateral joint line. No LCL laxity or MCL laxity. Left knee: No effusion, erythema or ecchymosis. Tenderness present over the medial joint line and lateral joint line. No LCL laxity or MCL laxity. Right lower leg: No edema. Left lower leg: No edema. Lymphadenopathy:      Cervical: No cervical adenopathy. Skin:     General: Skin is warm. Capillary Refill: Capillary refill takes less than 2 seconds. Neurological:      General: No focal deficit present. Mental Status: She is alert and oriented to person, place, and time. Psychiatric:         Mood and Affect: Mood normal.         Behavior: Behavior normal.       /78 (Site: Left Upper Arm, Position: Sitting, Cuff Size: Large Adult)   Pulse 71   Resp 16   Ht 5' 9\" (1.753 m)   Wt (!) 302 lb 9.6 oz (137.3 kg)   LMP 02/28/2015 (Approximate)   SpO2 95%   Breastfeeding No   BMI 44.69 kg/m²     Assessment:       ICD-10-CM    1. Primary hypertension  I10 metoprolol tartrate (LOPRESSOR) 25 MG tablet   2.  Chronic pain of both knees  M25.561 XR KNEE BILATERAL STANDING    M25.562 XR KNEE RIGHT (3 VIEWS)    G89.29 XR KNEE LEFT (3 VIEWS)   3. Morbid obesity with BMI of 40.0-44.9, adult ([address]  E66.01     Z68.41    4. Screening for diabetes mellitus  Z13.1 Hemoglobin A1C   5. Vitamin D deficiency  E55.9 Vitamin D 25 Hydroxy   6. Encounter for lipid screening for cardiovascular disease  Z13.220 Lipid Panel    Z13.6    7. Screening for thyroid disorder  Z13.29 TSH with Reflex   8. Screening for cervical cancer  Z12.4 [address] [facility], NP, Gynecology, [facility]:       1. Patient given refill of metoprolol 25 mg twice daily for management of hypertension. Blood pressure currently stable. Discussed DASH diet. 2.  Patient with chronic bilateral knee pain. Knee pain sometimes causes buckling/weakness. She has history of right knee arthroscopy in the past.  Plan to evaluate with plain films. Discussed continuing over-the-counter Tylenol and ibuprofen. Pending knee x-rays will refer to Ortho or physical therapy. Discussed the importance of weight management. 3.  I strongly encouraged weight loss efforts. I discussed dietary modifications. I encouraged daily walking.  4-7. Patient agreeable several screening labs to rule underlying illness to complete prior to annual physical.  8.  Patient given referral to gynecology to update cervical cancer screening.     Return in about 4 weeks (around 3/17/2022) for physical.    Orders Placed This Encounter   Procedures    XR KNEE BILATERAL STANDING     Standing Status:   Future     Standing Expiration Date:   2/17/2023     Order Specific Question:   Reason for exam:     Answer:   chronic knee pain bilateral    XR KNEE RIGHT (3 VIEWS)     Standing Status:   Future     Standing Expiration Date:   2/17/2023     Order Specific Question:   Reason for exam:     Answer:   chronic pain, Hx of arthroscopy    XR KNEE LEFT (3 VIEWS)     Standing Status:   Future     Standing Expiration Date:   2/17/2023     Order Specific Question:   Reason for exam:     Answer:   chronic left knee pain    TSH with Reflex     Standing Status:   Future     Standing Expiration Date:   2/17/2023    Lipid Panel     Standing Status:   Future     Standing Expiration Date:   5/17/2022     Order Specific Question:   Is Patient Fasting?/# of Hours     Answer: Fast 8-10 hours    Vitamin D 25 Hydroxy     Standing Status:   Future     Standing Expiration Date:   2/17/2023    Hemoglobin A1C     Standing Status:   Future     Standing Expiration Date:   2/17/2023    Aura Ghosh, FELI, Gynecology, [place]     Referral Priority:   Routine     Referral Type:   Eval and Treat     Referral Reason:   Specialty Services Required     Referred to Provider:   ELIZABETH Cox CNP     Requested Specialty:   Nurse Practitioner     Number of Visits Requested:   1         Patient given educational materials - see patient instructions. Discussed use, benefit, and side effects of prescribed medications. All patient questions answered. Pt voiced understanding. Reviewed health maintenance. Instructed to continue current medications, diet and exercise. Patient agreed with treatment plan. Follow up as directed.         Electronically signed by Fawad Gusman PA-C on 2/17/2022 at 2:19 PM

## 2022-02-25 NOTE — TELEPHONE ENCOUNTER
Writer called & spoke to pt over the phone and she confirms she will be at her 3/4/22 proc. Pt reminded of 2 day CLD. Pt voices her understanding.

## 2022-03-04 ENCOUNTER — ANESTHESIA (OUTPATIENT)
Dept: OPERATING ROOM | Age: 62
End: 2022-03-04
Payer: MEDICARE

## 2022-03-04 ENCOUNTER — TELEPHONE (OUTPATIENT)
Dept: ONCOLOGY | Age: 62
End: 2022-03-04

## 2022-03-04 ENCOUNTER — ANESTHESIA EVENT (OUTPATIENT)
Dept: OPERATING ROOM | Age: 62
End: 2022-03-04
Payer: MEDICARE

## 2022-03-04 ENCOUNTER — INITIAL CONSULT (OUTPATIENT)
Dept: ONCOLOGY | Age: 62
End: 2022-03-04
Payer: MEDICARE

## 2022-03-04 ENCOUNTER — HOSPITAL ENCOUNTER (OUTPATIENT)
Age: 62
Setting detail: OUTPATIENT SURGERY
Discharge: HOME | End: 2022-03-04
Attending: INTERNAL MEDICINE | Admitting: INTERNAL MEDICINE
Payer: MEDICARE

## 2022-03-04 VITALS
TEMPERATURE: 97.3 F | RESPIRATION RATE: 16 BRPM | DIASTOLIC BLOOD PRESSURE: 71 MMHG | BODY MASS INDEX: 42.06 KG/M2 | SYSTOLIC BLOOD PRESSURE: 107 MMHG | HEIGHT: 69 IN | HEART RATE: 76 BPM | WEIGHT: 284 LBS

## 2022-03-04 VITALS
BODY MASS INDEX: 40.73 KG/M2 | WEIGHT: 284.5 LBS | HEART RATE: 61 BPM | OXYGEN SATURATION: 99 % | RESPIRATION RATE: 16 BRPM | HEIGHT: 70 IN | SYSTOLIC BLOOD PRESSURE: 134 MMHG | TEMPERATURE: 97.5 F | DIASTOLIC BLOOD PRESSURE: 86 MMHG

## 2022-03-04 VITALS — DIASTOLIC BLOOD PRESSURE: 83 MMHG | OXYGEN SATURATION: 97 % | SYSTOLIC BLOOD PRESSURE: 106 MMHG

## 2022-03-04 DIAGNOSIS — Z80.3 FAMILY HISTORY OF BREAST CANCER: Primary | ICD-10-CM

## 2022-03-04 DIAGNOSIS — Z80.0 FAMILY HISTORY OF PANCREATIC CANCER: ICD-10-CM

## 2022-03-04 DIAGNOSIS — Z91.89 AT HIGH RISK FOR BREAST CANCER: Primary | ICD-10-CM

## 2022-03-04 DIAGNOSIS — Z91.89 AT HIGH RISK FOR BREAST CANCER: ICD-10-CM

## 2022-03-04 DIAGNOSIS — E66.01 MORBID OBESITY WITH BMI OF 40.0-44.9, ADULT (HCC): ICD-10-CM

## 2022-03-04 DIAGNOSIS — Z87.42 HISTORY OF POSTMENOPAUSAL BLEEDING: ICD-10-CM

## 2022-03-04 LAB — GLUCOSE BLD-MCNC: 201 MG/DL (ref 65–105)

## 2022-03-04 PROCEDURE — 7100000011 HC PHASE II RECOVERY - ADDTL 15 MIN: Performed by: INTERNAL MEDICINE

## 2022-03-04 PROCEDURE — 96040 PR GENETIC COUNSELING, EACH 30 MIN: CPT | Performed by: GENETIC COUNSELOR, MS

## 2022-03-04 PROCEDURE — 2709999900 HC NON-CHARGEABLE SUPPLY: Performed by: INTERNAL MEDICINE

## 2022-03-04 PROCEDURE — 3609027000 HC COLONOSCOPY: Performed by: INTERNAL MEDICINE

## 2022-03-04 PROCEDURE — 3700000000 HC ANESTHESIA ATTENDED CARE: Performed by: INTERNAL MEDICINE

## 2022-03-04 PROCEDURE — 99244 OFF/OP CNSLTJ NEW/EST MOD 40: CPT | Performed by: INTERNAL MEDICINE

## 2022-03-04 PROCEDURE — 3700000001 HC ADD 15 MINUTES (ANESTHESIA): Performed by: INTERNAL MEDICINE

## 2022-03-04 PROCEDURE — G8427 DOCREV CUR MEDS BY ELIG CLIN: HCPCS | Performed by: INTERNAL MEDICINE

## 2022-03-04 PROCEDURE — 6360000002 HC RX W HCPCS: Performed by: NURSE ANESTHETIST, CERTIFIED REGISTERED

## 2022-03-04 PROCEDURE — 45378 DIAGNOSTIC COLONOSCOPY: CPT | Performed by: INTERNAL MEDICINE

## 2022-03-04 PROCEDURE — G8484 FLU IMMUNIZE NO ADMIN: HCPCS | Performed by: INTERNAL MEDICINE

## 2022-03-04 PROCEDURE — G8417 CALC BMI ABV UP PARAM F/U: HCPCS | Performed by: INTERNAL MEDICINE

## 2022-03-04 PROCEDURE — 7100000010 HC PHASE II RECOVERY - FIRST 15 MIN: Performed by: INTERNAL MEDICINE

## 2022-03-04 PROCEDURE — 82947 ASSAY GLUCOSE BLOOD QUANT: CPT

## 2022-03-04 PROCEDURE — 2580000003 HC RX 258: Performed by: ANESTHESIOLOGY

## 2022-03-04 RX ORDER — SODIUM CHLORIDE 9 MG/ML
25 INJECTION, SOLUTION INTRAVENOUS PRN
Status: DISCONTINUED | OUTPATIENT
Start: 2022-03-04 | End: 2022-03-04 | Stop reason: HOSPADM

## 2022-03-04 RX ORDER — ONDANSETRON 2 MG/ML
4 INJECTION INTRAMUSCULAR; INTRAVENOUS
Status: DISCONTINUED | OUTPATIENT
Start: 2022-03-04 | End: 2022-03-04 | Stop reason: HOSPADM

## 2022-03-04 RX ORDER — LIDOCAINE HYDROCHLORIDE 10 MG/ML
1 INJECTION, SOLUTION EPIDURAL; INFILTRATION; INTRACAUDAL; PERINEURAL
Status: DISCONTINUED | OUTPATIENT
Start: 2022-03-05 | End: 2022-03-04 | Stop reason: HOSPADM

## 2022-03-04 RX ORDER — OXYCODONE HYDROCHLORIDE 5 MG/1
5 TABLET ORAL PRN
Status: DISCONTINUED | OUTPATIENT
Start: 2022-03-04 | End: 2022-03-04 | Stop reason: HOSPADM

## 2022-03-04 RX ORDER — FENTANYL CITRATE 50 UG/ML
25 INJECTION, SOLUTION INTRAMUSCULAR; INTRAVENOUS EVERY 5 MIN PRN
Status: DISCONTINUED | OUTPATIENT
Start: 2022-03-04 | End: 2022-03-04 | Stop reason: HOSPADM

## 2022-03-04 RX ORDER — SODIUM CHLORIDE 0.9 % (FLUSH) 0.9 %
5-40 SYRINGE (ML) INJECTION EVERY 12 HOURS SCHEDULED
Status: DISCONTINUED | OUTPATIENT
Start: 2022-03-04 | End: 2022-03-04 | Stop reason: HOSPADM

## 2022-03-04 RX ORDER — OXYCODONE HYDROCHLORIDE 5 MG/1
10 TABLET ORAL PRN
Status: DISCONTINUED | OUTPATIENT
Start: 2022-03-04 | End: 2022-03-04 | Stop reason: HOSPADM

## 2022-03-04 RX ORDER — FENTANYL CITRATE 50 UG/ML
50 INJECTION, SOLUTION INTRAMUSCULAR; INTRAVENOUS EVERY 5 MIN PRN
Status: DISCONTINUED | OUTPATIENT
Start: 2022-03-04 | End: 2022-03-04 | Stop reason: HOSPADM

## 2022-03-04 RX ORDER — PROPOFOL 10 MG/ML
INJECTION, EMULSION INTRAVENOUS PRN
Status: DISCONTINUED | OUTPATIENT
Start: 2022-03-04 | End: 2022-03-04 | Stop reason: SDUPTHER

## 2022-03-04 RX ORDER — SODIUM CHLORIDE 0.9 % (FLUSH) 0.9 %
5-40 SYRINGE (ML) INJECTION PRN
Status: DISCONTINUED | OUTPATIENT
Start: 2022-03-04 | End: 2022-03-04 | Stop reason: HOSPADM

## 2022-03-04 RX ORDER — SODIUM CHLORIDE, SODIUM LACTATE, POTASSIUM CHLORIDE, CALCIUM CHLORIDE 600; 310; 30; 20 MG/100ML; MG/100ML; MG/100ML; MG/100ML
INJECTION, SOLUTION INTRAVENOUS CONTINUOUS
Status: DISCONTINUED | OUTPATIENT
Start: 2022-03-05 | End: 2022-03-04 | Stop reason: HOSPADM

## 2022-03-04 RX ORDER — ACETAMINOPHEN 325 MG/1
650 TABLET ORAL EVERY 6 HOURS PRN
COMMUNITY

## 2022-03-04 RX ADMIN — SODIUM CHLORIDE, POTASSIUM CHLORIDE, SODIUM LACTATE AND CALCIUM CHLORIDE: 600; 310; 30; 20 INJECTION, SOLUTION INTRAVENOUS at 08:37

## 2022-03-04 RX ADMIN — PROPOFOL 20 MG: 10 INJECTION, EMULSION INTRAVENOUS at 08:53

## 2022-03-04 RX ADMIN — PROPOFOL 50 MG: 10 INJECTION, EMULSION INTRAVENOUS at 08:51

## 2022-03-04 RX ADMIN — PROPOFOL 30 MG: 10 INJECTION, EMULSION INTRAVENOUS at 08:54

## 2022-03-04 RX ADMIN — PROPOFOL 20 MG: 10 INJECTION, EMULSION INTRAVENOUS at 09:03

## 2022-03-04 RX ADMIN — PROPOFOL 20 MG: 10 INJECTION, EMULSION INTRAVENOUS at 08:57

## 2022-03-04 RX ADMIN — PROPOFOL 50 MG: 10 INJECTION, EMULSION INTRAVENOUS at 08:49

## 2022-03-04 RX ADMIN — PROPOFOL 20 MG: 10 INJECTION, EMULSION INTRAVENOUS at 09:00

## 2022-03-04 ASSESSMENT — PULMONARY FUNCTION TESTS
PIF_VALUE: 1

## 2022-03-04 ASSESSMENT — PAIN - FUNCTIONAL ASSESSMENT: PAIN_FUNCTIONAL_ASSESSMENT: 0-10

## 2022-03-04 ASSESSMENT — PAIN SCALES - GENERAL: PAINLEVEL_OUTOF10: 0

## 2022-03-04 NOTE — ANESTHESIA POSTPROCEDURE EVALUATION
Department of Anesthesiology  Postprocedure Note    Patient: Adam Reyes  MRN: [id number]  YOB: 1960  Date of evaluation: 3/4/2022  Time:  10:46 AM     Procedure Summary     Date: 03/04/22 Room / Location: Sue Cody  / [facility]    Anesthesia Start: 0848 Anesthesia Stop: 7535    Procedure: COLONOSCOPY DIAGNOSTIC (N/A ) Diagnosis: (DX HX OF RECTAL POLYPS     FAMILY HX OF COLON CANCER)    Surgeons: Loretta Adam MD Responsible Provider: Johan Vivar MD    Anesthesia Type: MAC ASA Status: 2          Anesthesia Type: MAC    Etienne Phase I: Etienne Score: 10    Etienne Phase II: Etienne Score: 10    Last vitals: Reviewed and per EMR flowsheets.        Anesthesia Post Evaluation    Patient location during evaluation: PACU  Patient participation: complete - patient participated  Level of consciousness: awake  Airway patency: patent  Nausea & Vomiting: no nausea and no vomiting  Complications: no  Cardiovascular status: hemodynamically stable  Respiratory status: acceptable  Hydration status: stable

## 2022-03-04 NOTE — H&P
Interval H&P Note    Pt Name: Sarah Cole  MRN: [id number]  YOB: 1960  Date of evaluation: 3/4/2022      [x] I have reviewed in [facility] the PCP Note by Donaldo Sawant PA-C dated 2/17/22 attached below for an Interval History and Physical note. [x] I have examined  Sarah Cole  There are no changes to the patient who is scheduled for a diagnostic colonoscopy by Dr Tyesha Cotter for hx colon polyps. Patient followed bowel prep until watery clear. Previous colonoscopy 5/28/15 hyperplastic rectal polyp. FH colon cancer in bother or polyps. Patient denies bowel changes, bloody tarry stools, diarrhea alternating with constipation, abdominal pain or unintentional weight loss. The patient denies new health changes, fever, chills, wheezing, cough, increased SOB, chest pain, open sores or wounds. PMH, Surgical History, Social History, Psych, and Family History reviewed and updated in EPIC in appropriate section. Vital signs: /80   Pulse 70   Temp 97.3 °F (36.3 °C) (Temporal)   Resp 20   Ht 5' 9.5\" (1.765 m)   Wt 284 lb 8 oz (129 kg)   LMP 02/28/2015 (Approximate)   SpO2 98%   BMI 41.41 kg/m²     Allergies:  Patient has no known allergies. Medications:    Prior to Admission medications    Medication Sig Start Date End Date Taking? Authorizing Provider   metoprolol tartrate (LOPRESSOR) 25 MG tablet Take 1 tablet by mouth 2 times daily 2/17/22   Alexis Claudio PA-C   magnesium citrate solution Take 592 mLs by mouth once for 1 dose Pt needs two bottles of Magnesium Citrate 2/11/22 2/11/22  Pravin James MD   bisacodyl (DULCOLAX) 5 MG EC tablet TAKE 4 TABS AT 10 AM THE DAY PRIOR TO COLONOSCOPY 2/11/22   Pravin James MD   polyethylene glycol (GLYCOLAX) 17 GM/SCOOP powder Use as directed by following your patient instructions given by office.  2/11/22   Pravin James MD   albuterol sulfate HFA (PROAIR HFA) 108 (90 Base) MCG/ACT inhaler Inhale 2 puffs into the lungs every 6 hours as needed for Wheezing 1/15/22   Viola Vang PA-C   ondansetron (ZOFRAN ODT) 4 MG disintegrating tablet Take 1 tablet by mouth every 8 hours as needed for Nausea or Vomiting 1/15/22   Misbah Mendoza PA-C   ibuprofen (ADVIL;MOTRIN) 200 MG tablet Take 200 mg by mouth every 6 hours as needed for Pain    Historical Provider, MD         This is a 60 y.o.morbidly obese female who is pleasant, cooperative, alert and oriented x3, in no acute distress    Physical Exam:  Lungs: Bilateral equal air entry, unlabored w/o use of accessory muscles,  clear to ausculation, no wheezing, rales or rhonchi, normal effort  Cardiovascular: HR 70  normal rate, regular rhythm, no murmur, gallop, rub. Abdomen: obese w/pannus Soft, nontender, nondistended, normal bowel sounds. Labs:  No results for input(s): HGB, HCT, WBC, MCV, PLT, NA, K, CL, CO2, BUN, CREATININE, GLUCOSE, INR, PROTIME, APTT, AST, ALT, LABALBU, HCG in the last 720 hours. No results for input(s): COVID19 in the last 720 hours. ELIZABETH Johnson CNP   Electronically signed 3/4/2022 at [address] [facility] TAHIR Felix   Physician Assistant   Specialty:  Physician Assistant   Progress Notes      Signed   Encounter Date:  2022         Related encounter: Office Visit from 2022 in [facility]           Signed        Expand All Collapse All           [address] [place] [address] [address] [facility] [address]  [address] [place] [address] [address] [address]  Dept: [phone]  Dept Fax: [phone]     Mari Vieira is a 60 y.o. female who presents today for her medical conditions/complaints as noted below. Chief Complaint   Patient presents with    Medication Refill       metoprolol for hypertension, pt having weakness of legs and feet noticed since 2021         HPI:      Patient presents to the office for follow-up status post Covid infection.   She presented to ED in January with shortness of breath, cough, upper respiratory concern. She was found to have right-sided pneumonia consistent with Covid pneumonia. She completed medications as prescribed. She reports she is feeling significantly better without any residual symptoms. Denies persistent cough, shortness of breath, chest pain, fevers, chills. Today, primary concern is bilateral lower extremity pain. She describes pain concentrated around bilateral knees. She states that this pain sometimes causes weakness of the legs. The pain is exacerbated with standing, walking, bending. Denies significant numbness or tingling. She does use Tylenol and ibuprofen over-the-counter. She has history of right knee arthroscopy in the past.  No recent knee exam/x-rays. No current orthopedic specialist.     Patient also requests refill for metoprolol that she has been on for hypertension. She reports prior PCP had her on this for several years. Denies any side effects or concerns. BP stable. Weight stable, but elevated. I reviewed with patient her allergies, medications, past medical history, surgical history, family history, and social history. She has not had screening labs in several years. No current gynecologist.  We have recently worked up mammogram she had concern for mass. After biopsy there is no evidence of malignancy. She will begin breast MRI and going to [facility] breast cancer clinic.         No results found for: LABA1C                                                                        ( goal A1C is < 7)   No results found for: LABMICR  No results found for: LDLCHOLESTEROL, LDLCALC                                              (goal LDL is <100)       AST (U/L)   Date Value   01/15/2022 27          ALT (U/L)   Date Value   01/15/2022 14          BUN (mg/dL)   Date Value   01/15/2022 10          BP Readings from Last 3 Encounters:   02/17/22 120/78   02/10/22 122/65   01/15/22 133/72 (goal 120/80)     Past Medical History        Past Medical History:   Diagnosis Date    GERD (gastroesophageal reflux disease)      Hyperplastic polyp of intestine      Hypertension      Metabolic syndrome       PRE DIABETIC    Obesity           Past Surgical History         Past Surgical History:   Procedure Laterality Date    BUNIONECTOMY Bilateral      COLONOSCOPY   5/28/15     BX RECTAL POLYP,HYPERPLASTIC POLYP    KNEE ARTHROSCOPY Right      [facility] LOC BREAST BIOPSY LEFT Left 2/10/2022     ROSE STEROTACTIC LOC BREAST BIOPSY LEFT 2/10/2022 [facility]            Family History         Family History   Problem Relation Age of Onset    Breast Cancer Mother      Breast Cancer Paternal Aunt      Breast Cancer Paternal Aunt      Breast Cancer Paternal Aunt      Breast Cancer Paternal Aunt      Breast Cancer Paternal Aunt      Breast Cancer Paternal Aunt              Social History           Tobacco Use    Smoking status: Never Smoker    Smokeless tobacco: Never Used   Substance Use Topics    Alcohol use: No      Current Facility-Administered Medications          Current Outpatient Medications   Medication Sig Dispense Refill    metoprolol tartrate (LOPRESSOR) 25 MG tablet Take 1 tablet by mouth 2 times daily 60 tablet 2    bisacodyl (DULCOLAX) 5 MG EC tablet TAKE 4 TABS AT 10 AM THE DAY PRIOR TO COLONOSCOPY 4 tablet 0    polyethylene glycol (GLYCOLAX) 17 GM/SCOOP powder Use as directed by following your patient instructions given by office.  238 g 0    albuterol sulfate HFA (PROAIR HFA) 108 (90 Base) MCG/ACT inhaler Inhale 2 puffs into the lungs every 6 hours as needed for Wheezing 18 g 0    ondansetron (ZOFRAN ODT) 4 MG disintegrating tablet Take 1 tablet by mouth every 8 hours as needed for Nausea or Vomiting 15 tablet 0    ibuprofen (ADVIL;MOTRIN) 200 MG tablet Take 200 mg by mouth every 6 hours as needed for Pain        magnesium citrate solution Take 592 mLs by mouth once for 1 dose Pt needs two bottles of Magnesium Citrate 592 mL 0      No current facility-administered medications for this visit. No Known Allergies     Health Maintenance   Topic Date Due    Hepatitis C screen  Never done    HIV screen  Never done    Cervical cancer screen  Never done    Diabetes screen  Never done    Lipid screen  Never done    Shingles Vaccine (1 of 2) Never done    Colorectal Cancer Screen  05/28/2018    COVID-19 Vaccine (3 - Booster for Pfizer series) 08/20/2021    Flu vaccine (1) 02/17/2023 (Originally 9/1/2021)    Depression Screen  01/18/2023    Breast cancer screen  02/10/2023    DTaP/Tdap/Td vaccine (2 - Td or Tdap) 12/04/2028    Hepatitis A vaccine  Aged Out    Hepatitis B vaccine  Aged Out    Hib vaccine  Aged Out    Meningococcal (ACWY) vaccine  Aged Out    Pneumococcal 0-64 years Vaccine  Aged Out         Subjective:      Review of Systems   Constitutional: Negative for chills, fatigue and fever. HENT: Negative for congestion, rhinorrhea and sinus pain. Respiratory: Negative for cough and shortness of breath. Cardiovascular: Negative for chest pain and leg swelling. Gastrointestinal: Negative for abdominal pain, constipation, diarrhea, nausea and vomiting. Genitourinary: Negative for difficulty urinating, frequency and urgency. Musculoskeletal: Positive for arthralgias. Negative for back pain, gait problem and myalgias. Neurological: Positive for weakness (subjective). Negative for dizziness and headaches. Psychiatric/Behavioral: Negative for confusion, dysphoric mood and sleep disturbance. The patient is not nervous/anxious. All other systems reviewed and are negative. Objective:      Physical Exam  Vitals and nursing note reviewed. Constitutional:       General: She is not in acute distress. Appearance: Normal appearance. HENT:      Head: Normocephalic.       Mouth/Throat:      Mouth: Mucous membranes are moist.   Eyes: Extraocular Movements: Extraocular movements intact. Conjunctiva/sclera: Conjunctivae normal.      Pupils: Pupils are equal, round, and reactive to light. Cardiovascular:      Rate and Rhythm: Normal rate and regular rhythm. Pulses: Normal pulses. Heart sounds: Normal heart sounds. Pulmonary:      Effort: Pulmonary effort is normal.      Breath sounds: Normal breath sounds. Abdominal:      General: Abdomen is flat. Bowel sounds are normal.      Palpations: Abdomen is soft. Tenderness: There is no abdominal tenderness. Musculoskeletal:      Cervical back: Normal range of motion. Right knee: No effusion, erythema or ecchymosis. Tenderness present over the medial joint line and lateral joint line. No LCL laxity or MCL laxity. Left knee: No effusion, erythema or ecchymosis. Tenderness present over the medial joint line and lateral joint line. No LCL laxity or MCL laxity. Right lower leg: No edema. Left lower leg: No edema. Lymphadenopathy:      Cervical: No cervical adenopathy. Skin:     General: Skin is warm. Capillary Refill: Capillary refill takes less than 2 seconds. Neurological:      General: No focal deficit present. Mental Status: She is alert and oriented to person, place, and time. Psychiatric:         Mood and Affect: Mood normal.         Behavior: Behavior normal.         /78 (Site: Left Upper Arm, Position: Sitting, Cuff Size: Large Adult)   Pulse 71   Resp 16   Ht 5' 9\" (1.753 m)   Wt (!) 302 lb 9.6 oz (137.3 kg)   LMP 02/28/2015 (Approximate)   SpO2 95%   Breastfeeding No   BMI 44.69 kg/m²      Assessment:          ICD-10-CM     1. Primary hypertension  I10 metoprolol tartrate (LOPRESSOR) 25 MG tablet   2. Chronic pain of both knees  M25.561 XR KNEE BILATERAL STANDING     M25.562 XR KNEE RIGHT (3 VIEWS)     G89.29 XR KNEE LEFT (3 VIEWS)   3. Morbid obesity with BMI of 40.0-44.9, adult ([facility]  E66.01       Z68.41     4.  Screening for diabetes mellitus  Z13.1 Hemoglobin A1C   5. Vitamin D deficiency  E55.9 Vitamin D 25 Hydroxy   6. Encounter for lipid screening for cardiovascular disease  Z13.220 Lipid Panel     Z13.6     7. Screening for thyroid disorder  Z13.29 TSH with Reflex   8. Screening for cervical cancer  Z12.4 [address], NP, Gynecology, [place] [address]:       1. Patient given refill of metoprolol 25 mg twice daily for management of hypertension. Blood pressure currently stable. Discussed DASH diet. 2.  Patient with chronic bilateral knee pain. Knee pain sometimes causes buckling/weakness. She has history of right knee arthroscopy in the past.  Plan to evaluate with plain films. Discussed continuing over-the-counter Tylenol and ibuprofen. Pending knee x-rays will refer to Ortho or physical therapy. Discussed the importance of weight management. 3.  I strongly encouraged weight loss efforts. I discussed dietary modifications. I encouraged daily walking.  4-7. Patient agreeable several screening labs to rule underlying illness to complete prior to annual physical.  8.  Patient given referral to gynecology to update cervical cancer screening.      Return in about 4 weeks (around 3/17/2022) for physical.           Orders Placed This Encounter   Procedures    XR KNEE BILATERAL STANDING       Standing Status:   Future       Standing Expiration Date:   2/17/2023       Order Specific Question:   Reason for exam:       Answer:   chronic knee pain bilateral    XR KNEE RIGHT (3 VIEWS)       Standing Status:   Future       Standing Expiration Date:   2/17/2023       Order Specific Question:   Reason for exam:       Answer:   chronic pain, Hx of arthroscopy    XR KNEE LEFT (3 VIEWS)       Standing Status:   Future       Standing Expiration Date:   2/17/2023       Order Specific Question:   Reason for exam:       Answer:   chronic left knee pain    TSH with Reflex       Standing Status:   Future       Standing Expiration Date:   2/17/2023    Lipid Panel       Standing Status:   Future       Standing Expiration Date:   5/17/2022       Order Specific Question:   Is Patient Fasting?/# of Hours       Answer: Fast 8-10 hours    Vitamin D 25 Hydroxy       Standing Status:   Future       Standing Expiration Date:   2/17/2023    Hemoglobin A1C       Standing Status:   Future       Standing Expiration Date:   2/17/2023    Leonora Jackson NP, Gynecology, [place]       Referral Priority:   Routine       Referral Type:   Eval and Treat       Referral Reason:   Specialty Services Required       Referred to Provider:   ELIZABETH Varela CNP       Requested Specialty:   Nurse Practitioner       Number of Visits Requested:   1          Patient given educational materials - see patient instructions. Discussed use, benefit, and side effects of prescribed medications. All patient questions answered. Pt voiced understanding. Reviewed health maintenance. Instructed to continue current medications, diet and exercise. Patient agreed with treatment plan. Follow up as directed.            Electronically signed by Yani Rice PA-C on 2/17/2022 at 2:19 PM

## 2022-03-04 NOTE — PROGRESS NOTES
[employer] [facility]   Hereditary Cancer Risk Assessment     Name: Sarah Cole   YOB: 1960   Date of Consultation: 3/4/22     Ms. Jessika Haddad was seen at the [place] [address] for genetic counseling on 3/4/22. She is also seen by medical oncologist Dr. Charles Chavez. Ms. Jessika Haddad was referred by Coy Cunningham PA-C to discuss her elevated lifetime risk for breast cancer which was calculated at her most recent mammogram.      PERSONAL HISTORY   Ms. Jessika Haddad is a 60 y.o.  female with no personal history of cancer. She reports menarche at age 14, first child at age 28, and underwent menopause at age 53. Ms. Jessika Haddad has never had a hysterectomy and both ovaries are intact. Ms. Jessika Haddad reports annual mammograms. She has never had a breast MRI or required a breast biopsy. At her mammogram on 1/29/22, a lifetime risk for breast cancer was calculated. According to the [employer] risk model, Ms. Domínguez's lifetime risk for breast cancer is approximately 26.6%. FAMILY HISTORY  Ms. Jessika Haddad has two daughters and two sons. She has one full sister and three full brothers. One brother had colon cancer at age 58 and one brother had prostate cancer at age 59. Ms. Jenna Cedeno mother was diagnosed with breast cancer in her 76s. However, there are no other significant cancers in her extended maternal family. Ms. Jenna Cedeno father passed away at age 80, no cancer. She has at least 6 paternal aunts with breast cancer, 2 diagnosed under age 48. One paternal aunt had pancreatic cancer. Ms. Jessika Haddad reports unknown ancestry and denies any known Ashkenazi Shinto heritage. RISK ASSESSMENT   We discussed that approximately 5-10% of cancers are due to a hereditary gene mutation which causes an increased risk for certain cancers. Hereditary cancers are typically diagnosed at younger ages (under age 46y) and occur in multiple generations of a family.  Multiple individuals with the same type of cancer (example: breast or colorectal) or uncommon cancers (example: ovarian, pancreatic, male breast cancer) are also features of hereditary cancers. In summary, Ms. [address] (NCCN) guidelines for genetic testing based on having 3+ paternal relatives with breast cancer. The NCCN guidelines also recognize that an individual's personal and/or family history may be explained by more than one inherited cancer syndrome. Thus, a multi-gene panel may be more efficient, more cost effecting, and increases the yield of detecting a hereditary mutation which would impact medical management. Given her personal and/or family history, we recommend testing for the following genes at minimum: SHAZIA, CHEK2, NBN, and PALB2. DISCUSSION  We discussed that the BRCA1/2 genes are the most common genes associated with hereditary breast and ovarian cancer. We also discussed that genetic testing is available for multiple other genes related to hereditary cancer. Some of these genes are known to carry a significant increased risk for several cancers including colon, breast, uterine, ovarian, stomach, and pancreatic cancer, while some of these genes are believed to have a moderate increased risk for breast and other cancers. We discussed the possibility of finding a mutation in genes with limited information to guide medical management, as well we as the possibility of identifying variants of uncertain significance (VUS). We discussed the risks, benefits, and limitations of genetic testing. Possible test results were discussed as well as potential screening and prevention strategies. Specifically, we discussed increased breast cancer surveillance by mammogram and breast MRI as well as the option of prophylactic mastectomy. We discussed the recommendation for prophylactic oophorectomy for results which suggest an increased risk for ovarian cancer. Lastly, we discussed that the results of Ms. Domínguez's genetic testing may be beneficial in defining her risk for cancer as well as for her family members. SUMMARY & PLAN  1) Ms. Emerick Severs meets the NCCN criteria for genetic testing based on her paternal family history of breast cancer. 2) Genetic testing via a multi-gene panel was recommended and offered to Ms. Domínguez. 3) Ms. Emerick Severs elected to proceed with the [facility] gene panel. 4) Ms. Emerick Severs is aware that she will receive a notification from [employer] if the out of pocket cost for testing exceeds $100 (based on individual insurance plan) and the option to proceed with the self pay price of $249.     5) Informed consent was obtained and a blood sample was sent to [employer]. We will call Ms. Emerick Severs with results as soon as they are available. A follow up appointment may be recommended. A summary letter with results and final medical management recommendations will be sent once available. A total of 40 minutes were spent face to face with Ms. Domínguez and 50% of the time was spent educating and counseling. The [facility] would be glad to offer our assistance should you have any questions or concerns about this information. Please feel free to contact us at [phone]. Gildardo Spencer MS, [facility]   Licensed Genetic Counselor

## 2022-03-04 NOTE — OP NOTE
PROCEDURE NOTE    DATE OF PROCEDURE: 3/4/2022    SURGEON: Everette Anthony MD  Facility : [facility] DR JULIO CESAR KIRBY  ASSISTANT: None  Anesthesia: MAC  PREOPERATIVE DIAGNOSIS:   History of polyps    POSTOPERATIVE DIAGNOSIS: as described below    OPERATION: Total colonoscopy     ANESTHESIA: Moderate Sedation    ESTIMATED BLOOD LOSS: less than 50     COMPLICATIONS: None. SPECIMENS:  Was Not Obtained    HISTORY: The patient is a 60y.o. year old female with history of above preop diagnosis. I recommended colonoscopy with possible biopsy or polypectomy and I explained the risk, benefits, expected outcome, and alternatives to the procedure. Risks included but are not limited to bleeding, infection, respiratory distress, hypotension, and perforation of the colon and possibility of missing a lesion. The patient understands and is in agreement. The patient was counseled at length about the risks of alvarez Covid-19 during their perioperative period and any recovery window from their procedure. The patient was made aware that alvarez Covid-19  may worsen their prognosis for recovering from their procedure  and lend to a higher morbidity and/or mortality risk. All material risks, benefits, and reasonable alternatives including postponing the procedure were discussed. The patient does wish to proceed with the procedure at this time. PROCEDURE: The patient was given IV conscious sedation. The patient's SPO2 remained above 90% throughout the procedure. The colonoscope was inserted per rectum and advanced under direct vision to the cecum without difficulty. Post sedation note : The patient's SPO2 remained above 90% throughout the procedure. the vital signs remained stable , and no immediate complication form the procedure noted, patient will be ready for d/c when criteria is met . The prep was good.       Findings:  Terminal ileum: normal    Cecum/Ascending colon: normal    Transverse colon: normal    Descending/Sigmoid colon: abnormal: Diverticulosis    Rectum/Anus: examined in normal and retroflexed positions and was abnormal: Hemorrhoids    Withdrawal Time was (minutes): 10    The colon was decompressed and the scope was removed. The patient tolerated the procedure well. Recommendations/Plan:   1. Lifestyle and dietary modifications as discussed  2. F/U Biopsies  3. F/U In OfficeYes  4. Discussed with the family  5.  Repeat colonoscopy [id number]    Electronically signed by Eitan Chow MD  on 3/4/2022 at 9:09 AM

## 2022-03-04 NOTE — PROGRESS NOTES
BRIEF CASE HISTORY: Patient is a very pleasant 60 y.o. female who is referred to us for consultation for the high risk  breast cancer clinic. She is evaluated by myself and the genetic counselor. She does not have any personal history of breast cancer but she has significant family history. She underwent screening mammogram and during the screen, [facility]. The questionnaire showed that her lifetime risk of breast cancer is 26.6%  which falls into the high risk category. She is referred to us for counseling, discussion of risk reduction modalities as well as genetic evaluation. Patient also has history of post menstrual bleeding. Previously she has had ultrasound of pelvis done which showed fibroids. Lately she has not followed up with a gynecologist.  At present she does not have any post menstrual bleeding. GYN history     Breast density average, menarche 14years of age, first live birth 28 years, menopause 53 years, hysterectomy 20, breast biopsy 2/10/2022 came back as fibroadenoma. Last mammogram January 2022. PAST MEDICAL HISTORY:  has a past medical history of Diabetes mellitus ([address] GERD (gastroesophageal reflux disease), Hyperplastic polyp of intestine, Hypertension, Metabolic syndrome, and Obesity.       PAST SURGICAL HISTORY:   Past Surgical History:   Procedure Laterality Date    BUNIONECTOMY Bilateral     COLONOSCOPY  5/28/15    BX RECTAL POLYP,HYPERPLASTIC POLYP    COLONOSCOPY N/A 3/4/2022    COLONOSCOPY DIAGNOSTIC performed by Herve Gregg MD at [facility] ARTHROSCOPY Right     [facility] LOC BREAST BIOPSY LEFT Left 2/10/2022    ROSE STEROTACTIC LOC BREAST BIOPSY LEFT 2/10/2022 [facility]        CURRENT MEDICATIONS:  has a current medication list which includes the following prescription(s): acetaminophen, metoprolol tartrate, albuterol sulfate hfa, ibuprofen, magnesium citrate, bisacodyl, and polyethylene glycol. ALLERGIES:  has No Known Allergies. FAMILY HISTORY: detailed family history was obtained, and reviewed, it is detailed in the genetic counselor note. SOCIAL HISTORY:   reports that she has never smoked. She has never used smokeless tobacco. She reports that she does not drink alcohol and does not use drugs. REVIEW OF SYSTEMS:   General: No fever or night sweats. Weight is stable. ENT: No double or blurred vision, no tinnitus or hearing problem, no dysphagia or sore throat   Respiratory: No chest pain, no shortness of breath, no cough or hemoptysis. Cardiovascular: Denies chest pain, PND or orthopnea. No L E swelling or palpitations. Gastrointestinal: No nausea or vomiting, abdominal pain, diarrhea or constipation. Genitourinary: Denies dysuria, hematuria, frequency, urgency or incontinence. Neurological: Denies headaches, decreased LOC, no sensory or motor focal deficits. Musculoskeletal: No arthralgia no back pain or joint swelling. PHYSICAL EXAM: Shows a well appearing 60 y.o. female who is not in pain or distress. Vital Signs: Blood pressure 107/71, pulse 76, temperature 97.3 °F (36.3 °C), temperature source Temporal, resp. rate 16, height 5' 9\" (1.753 m), weight 284 lb (128.8 kg), last menstrual period 02/28/2015, not currently breastfeeding. HEENT: Normocephalic and atraumatic. Pupils are equal, round, reactive to light and accommodation. Extraocular muscles are intact. Neck: Showed no JVD, no carotid bruit . Lungs: Clear to auscultation bilaterally. Heart: Regular without any murmur. Abdomen: Soft, nontender. No hepatosplenomegaly. Extremities: Lower extremities show no edema, clubbing, or cyanosis.  Breasts: Deferred neuro exam: intact cranial nerves bilaterally no motor or sensory deficit, gait is normal. Lymphatic: no adenopathy appreciated in the supraclavicular, axillary, cervical or inguinal area    REVIEW OF LABORATORY DATA:     Results for orders placed or performed during the hospital encounter of 03/04/22   POC Glucose Fingerstick   Result Value Ref Range    POC Glucose 201 (H) 65 - 105 mg/dL       REVIEW OF RADIOLOGICAL RESULTS:     ROSE STEROTACTIC LOC BREAST BIOPSY LEFT    Addendum Date: 2/14/2022    ADDENDUM: Radiology/pathology correlation: Pathology results from a stereotactic guided biopsy performed on a mass in the 6 o'clock left breast demonstrates sclerotic fibroadenoma and benign breast tissue. Negative for atypia and malignancy. The result is benign and concordant. The patient can return to annual screening mammogram.  As was noted on prior screening mammogram report dated 01/29/2022, the patient is considered high risk and should be considered for a screening breast MRI in 6 months. BI-RADS 2     Result Date: 2/14/2022  EXAMINATION: STEREOTACTIC left BREAST BIOPSY WITH VACUUM-ASSIST STEREOTACTIC CLIP PLACEMENT SPECIMEN RADIOGRAPH POSTPROCEDURE UNILATERAL DIGITAL MAMMOGRAM 2/10/2022 HISTORY: ORDERING SYSTEM PROVIDED HISTORY: Breast mass, left TECHNOLOGIST PROVIDED HISTORY: suspicious mass on ultrasound and mammogram COMPARISON: Mammogram dated 01/29/2022. Ultrasound dated 02/07/2022 PROCEDURE: A timeout was performed to confirm patient identification and site of procedure. Risks, benefits, and alternatives of the procedure were discussed. Informed written consent was obtained. The biopsy site was prepped and draped in the standard fashion. Buffered 2% lidocaine was used for superficial local anesthesia. 2% lidocaine with epinephrine was used for deeper local anesthesia. A small skin incision was made. There was some minor difficulty advancing the probe through the skin nick due to skin laxity and resistance from the plastic sheath around the probe. Using a from below approach, an 9 gauge [employer] Eviva vacuum assist needle was positioned under stereotactic guidance. 3 core samples were obtained.  FINDINGS: CLIP PLACEMENT: Following tissue sampling, a HydroMARK butterfly clip was placed at the biopsy site. The stereotactic needle was removed and pressure applied for hemostasis. The patient tolerated the procedure well with no immediate complications. SPECIMEN RADIOGRAPH: A specimen radiograph was performed showing calcifications in 1 of the core samples. POST-PROCEDURE MAMMOGRAM: ML and CC views were performed and compared to the pre biopsy images. The biopsy clip is in the correct location with respect to the targeted site. There is no evidence of biopsy clip migration from the biopsy site. Technically successful stereotactic biopsy of the left breast mass and HydroMARK butterflyclip marker placement as described above. BIRADS: ZW - Pathology pending. US BREAST LIMITED LEFT    Result Date: 2/7/2022  EXAMINATION: TARGETED ULTRASOUND OF THE LEFT BREAST 2/7/2022 COMPARISON: Screening mammogram of 29 January 2022 HISTORY: ORDERING SYSTEM PROVIDED HISTORY: Breast mass, left TECHNOLOGIST PROVIDED HISTORY: mass seen on mammogram Workup of a mass left breast of 16 mm at approximately 6 o'clock. FINDINGS: Targeted real-time ultrasonography of the left breast from 5 to 7 o'clock reveals some dilated ducts in the retroareolar area. There is no ultrasound corollary to the mass in the patient's left breast at 6 o'clock. There is a calcification that is seen but the location does not convincingly match up to the mammogram.  Tissue sampling of the mass under stereotactic mammographic guidance is advised. No ultrasound corollary to a left breast mass. Tissue sampling under stereotactic mammographic guidance is advised. The patient also had dilated ducts in the retroareolar area. The ducts are non-worrisome in appearance at this time. BIRADS 4B BIRADS: BIRADS - CATEGORY 4B Moderate suspicion for malignancy. Suspicious Abnormality. Biopsy should be considered at this time.  OVERALL ASSESSMENT - SUSPICIOUS A letter of notification will be sent to the patient regarding the results. My findings and recommendations were discussed with the patient at the time of service. A representative from the radiology department will be contacting your office and assisting the patient in getting appropriate follow-up. MAMMOGRAM POST BX CLIP PLACEMENT LEFT    Addendum Date: 2/14/2022    ADDENDUM: Radiology/pathology correlation: Pathology results from a stereotactic guided biopsy performed on a mass in the 6 o'clock left breast demonstrates sclerotic fibroadenoma and benign breast tissue. Negative for atypia and malignancy. The result is benign and concordant. The patient can return to annual screening mammogram.  As was noted on prior screening mammogram report dated 01/29/2022, the patient is considered high risk and should be considered for a screening breast MRI in 6 months. BI-RADS 2     Result Date: 2/14/2022  EXAMINATION: STEREOTACTIC left BREAST BIOPSY WITH VACUUM-ASSIST STEREOTACTIC CLIP PLACEMENT SPECIMEN RADIOGRAPH POSTPROCEDURE UNILATERAL DIGITAL MAMMOGRAM 2/10/2022 HISTORY: ORDERING SYSTEM PROVIDED HISTORY: Breast mass, left TECHNOLOGIST PROVIDED HISTORY: suspicious mass on ultrasound and mammogram COMPARISON: Mammogram dated 01/29/2022. Ultrasound dated 02/07/2022 PROCEDURE: A timeout was performed to confirm patient identification and site of procedure. Risks, benefits, and alternatives of the procedure were discussed. Informed written consent was obtained. The biopsy site was prepped and draped in the standard fashion. Buffered 2% lidocaine was used for superficial local anesthesia. 2% lidocaine with epinephrine was used for deeper local anesthesia. A small skin incision was made. There was some minor difficulty advancing the probe through the skin nick due to skin laxity and resistance from the plastic sheath around the probe. Using a from below approach, an 9 gauge [employer] Eviva vacuum assist needle was positioned under stereotactic guidance.   3 core samples were obtained. FINDINGS: CLIP PLACEMENT: Following tissue sampling, a HydroMARK butterfly clip was placed at the biopsy site. The stereotactic needle was removed and pressure applied for hemostasis. The patient tolerated the procedure well with no immediate complications. SPECIMEN RADIOGRAPH: A specimen radiograph was performed showing calcifications in 1 of the core samples. POST-PROCEDURE MAMMOGRAM: ML and CC views were performed and compared to the pre biopsy images. The biopsy clip is in the correct location with respect to the targeted site. There is no evidence of biopsy clip migration from the biopsy site. Technically successful stereotactic biopsy of the left breast mass and HydroMARK butterflyclip marker placement as described above. BIRADS: ZW - Pathology pending. IMPRESSION:   1. Elevated lifetime risk of breast cancer based on the [address], life time risk is 26.6%  2. History of postmenopausal bleeding  3. Obesity  4. Family history of cancer  PLAN:   I had a very long discussion with the patient, explaining the Jose Kub model and the risk factor that led to hair falling into the high risk category. Lifetime risk of developing breast cancer in average [facility] woman ranges between 10% and 12%. So her lifetime risk is almost double the normal.   Various governing bodies in the oncology world have determined that lifetime risk of breast cancer above 20% constitutes a group of women who are eligible for enhanced surveillance and should be counseled about genetic breast cancer.   Also guidelines determined that this group of women are eligible for breast cancer reduction modalities such as the use of tamoxifen based on the NSABP study of tamoxifen and raloxifene and breast cancer (STAR trial) her tamoxifen was able to decrease the discomfort risk cancer by 50%  Side effects of tamoxifen were explained in detail including hot flashes, possibility of elevated liver enzymes, the rare possibilities of deep venous thrombosis and the even more rare possibility of vaginal bleeding, endometrial hyperplasia or even endometrial carcinoma. After detailed discussion patient is interested in chemoprevention however given history of post menstrual bleeding I would like to have patient evaluated by gynecologist before starting patient tamoxifen for chemoprevention  Also discussed other options including aromatase inhibitor as well as raloxifene for chemoprevention  Recommend annual screening mammogram.  Recommend annual screening MRI breast.  We will order MRI breast for July and continue mammograms every January. We will see patient back in office to review results of the MRI breast and redressed chemoprevention after evaluation by gynecology. Evelia Freed MD      This note is created with the assistance of a speech recognition program.  While intending to generate a document that actually reflects the content of the visit, the document can still have some errors including those of syntax and sound a like substitutions which may escape proof reading. It such instances, actual meaning can be extrapolated by contextual diversion.

## 2022-03-04 NOTE — PATIENT INSTRUCTIONS
Mri breast in July   Refer to gyn  rv after mri at [facility] per pt request

## 2022-03-04 NOTE — TELEPHONE ENCOUNTER
Mri breast in July  Refer to gyn  rv after mri at [facility] per pt request    PT to schedule MRI one week prior to RV    GYN office to contact patient to schedule consult    AVS faxed to [facility] to alert to schedule follow up    PT was given AVS and an appt schedule    Electronically signed by Manuel Bourne on 3/4/2022 at 3:33 PM

## 2022-03-04 NOTE — ANESTHESIA PRE PROCEDURE
Department of Anesthesiology  Preprocedure Note       Name:  Yessy Singh   Age:  60 y.o.  :  1960                                          MRN:  [id number]         Date:  3/4/2022      Surgeon: Lalitha Hong):  Isaias Brown MD    Procedure: Procedure(s):  COLONOSCOPY DIAGNOSTIC    Medications prior to admission:   Prior to Admission medications    Medication Sig Start Date End Date Taking? Authorizing Provider   metoprolol tartrate (LOPRESSOR) 25 MG tablet Take 1 tablet by mouth 2 times daily 22   Alexis Claudio PA-C   magnesium citrate solution Take 592 mLs by mouth once for 1 dose Pt needs two bottles of Magnesium Citrate 22  Pravin James MD   bisacodyl (DULCOLAX) 5 MG EC tablet TAKE 4 TABS AT 10 AM THE DAY PRIOR TO COLONOSCOPY 22   Pravin James MD   polyethylene glycol (GLYCOLAX) 17 GM/SCOOP powder Use as directed by following your patient instructions given by office.  22   Pravin James MD   albuterol sulfate HFA (PROAIR HFA) 108 (90 Base) MCG/ACT inhaler Inhale 2 puffs into the lungs every 6 hours as needed for Wheezing 1/15/22   Tino Mendoza PA-C   ondansetron (ZOFRAN ODT) 4 MG disintegrating tablet Take 1 tablet by mouth every 8 hours as needed for Nausea or Vomiting 1/15/22   Tino Mendoza PA-C   ibuprofen (ADVIL;MOTRIN) 200 MG tablet Take 200 mg by mouth every 6 hours as needed for Pain    Historical Provider, MD       Current medications:    Current Facility-Administered Medications   Medication Dose Route Frequency Provider Last Rate Last Admin    [START ON 3/5/2022] lidocaine PF 1 % injection 1 mL  1 mL IntraDERmal Once PRN Elmira Churchill MD       [facility]] lactated ringers infusion   IntraVENous Continuous Elmira Churchill MD           Allergies:  No Known Allergies    Problem List:    Patient Active Problem List   Diagnosis Code    Hyperplastic polyp of intestine K63.5    Morbid obesity with BMI of 40.0-44.9, adult ([facility]) E66.01, Z68.41    Chronic pain of both knees M25.561, M25.562, G89.29    Primary hypertension I10       Past Medical History:        Diagnosis Date    GERD (gastroesophageal reflux disease)     Hyperplastic polyp of intestine     Hypertension     Metabolic syndrome     PRE DIABETIC    Obesity        Past Surgical History:        Procedure Laterality Date    BUNIONECTOMY Bilateral     COLONOSCOPY  5/28/15    BX RECTAL POLYP,HYPERPLASTIC POLYP    KNEE ARTHROSCOPY Right     ROSE STEROTACTIC LOC BREAST BIOPSY LEFT Left 2/10/2022    ROSE STEROTACTIC LOC BREAST BIOPSY LEFT 2/10/2022 [facility]       Social History:    Social History     Tobacco Use    Smoking status: Never Smoker    Smokeless tobacco: Never Used   Substance Use Topics    Alcohol use: No                                Counseling given: Not Answered      Vital Signs (Current):   Vitals:    03/04/22 0813 03/04/22 0814   BP:  137/80   Pulse:  70   Resp:  20   Temp:  97.3 °F (36.3 °C)   TempSrc:  Temporal   SpO2:  98%   Weight: 284 lb 8 oz (129 kg)    Height: 5' 9.5\" (1.765 m)                                               BP Readings from Last 3 Encounters:   03/04/22 137/80   02/17/22 120/78   02/10/22 122/65       NPO Status:                                                                                 BMI:   Wt Readings from Last 3 Encounters:   03/04/22 284 lb 8 oz (129 kg)   02/17/22 (!) 302 lb 9.6 oz (137.3 kg)   01/15/22 (!) 310 lb (140.6 kg)     Body mass index is 41.41 kg/m².     CBC:   Lab Results   Component Value Date    WBC 4.3 01/15/2022    RBC 4.65 01/15/2022    HGB 13.4 01/15/2022    HCT 40.9 01/15/2022    MCV 88.0 01/15/2022    RDW 13.8 01/15/2022     01/15/2022       CMP:   Lab Results   Component Value Date     01/15/2022    K 3.6 01/15/2022    CL 98 01/15/2022    CO2 23 01/15/2022    BUN 10 01/15/2022    CREATININE 0.76 01/15/2022    GFRAA >60 01/15/2022    LABGLOM >60 01/15/2022    GLUCOSE 107 01/15/2022    PROT 7.9 01/15/2022    CALCIUM 8.9 01/15/2022    BILITOT 1.40 01/15/2022    ALKPHOS 81 01/15/2022    AST 27 01/15/2022    ALT 14 01/15/2022       POC Tests: No results for input(s): POCGLU, POCNA, POCK, POCCL, POCBUN, POCHEMO, POCHCT in the last 72 hours. Coags: No results found for: PROTIME, INR, APTT    HCG (If Applicable): No results found for: PREGTESTUR, PREGSERUM, HCG, HCGQUANT     ABGs: No results found for: PHART, PO2ART, TZK1OUM, RZL9JVR, BEART, M0XLVGNP     Type & Screen (If Applicable):  No results found for: LABABO, LABRH    Drug/Infectious Status (If Applicable):  No results found for: HIV, HEPCAB    COVID-19 Screening (If Applicable): No results found for: COVID19        Anesthesia Evaluation  Patient summary reviewed and Nursing notes reviewed  Airway: Mallampati: II  TM distance: >3 FB   Neck ROM: full  Mouth opening: > = 3 FB Dental: normal exam         Pulmonary:Negative Pulmonary ROS and normal exam  breath sounds clear to auscultation                             Cardiovascular:    (+) hypertension:,                   Neuro/Psych:   Negative Neuro/Psych ROS              GI/Hepatic/Renal:   (+) GERD:,           Endo/Other: Negative Endo/Other ROS                    Abdominal:             Vascular: negative vascular ROS. Other Findings:             Anesthesia Plan      MAC     ASA 2             Anesthetic plan and risks discussed with patient. Plan discussed with CRNA.                   Germaine Navarrete MD   3/4/2022

## 2022-03-15 ENCOUNTER — HOSPITAL ENCOUNTER (OUTPATIENT)
Age: 62
Discharge: HOME | End: 2022-03-17
Payer: MEDICARE

## 2022-03-15 ENCOUNTER — HOSPITAL ENCOUNTER (OUTPATIENT)
Age: 62
Discharge: HOME | End: 2022-03-15
Payer: MEDICARE

## 2022-03-15 ENCOUNTER — HOSPITAL ENCOUNTER (OUTPATIENT)
Dept: GENERAL RADIOLOGY | Age: 62
Discharge: HOME | End: 2022-03-17
Payer: MEDICARE

## 2022-03-15 DIAGNOSIS — Z13.220 ENCOUNTER FOR LIPID SCREENING FOR CARDIOVASCULAR DISEASE: ICD-10-CM

## 2022-03-15 DIAGNOSIS — G89.29 CHRONIC PAIN OF BOTH KNEES: ICD-10-CM

## 2022-03-15 DIAGNOSIS — M17.0 PRIMARY OSTEOARTHRITIS OF BOTH KNEES: ICD-10-CM

## 2022-03-15 DIAGNOSIS — M25.561 CHRONIC PAIN OF BOTH KNEES: Primary | ICD-10-CM

## 2022-03-15 DIAGNOSIS — M25.561 CHRONIC PAIN OF BOTH KNEES: ICD-10-CM

## 2022-03-15 DIAGNOSIS — Z13.29 SCREENING FOR THYROID DISORDER: ICD-10-CM

## 2022-03-15 DIAGNOSIS — M25.562 CHRONIC PAIN OF BOTH KNEES: ICD-10-CM

## 2022-03-15 DIAGNOSIS — M25.562 CHRONIC PAIN OF BOTH KNEES: Primary | ICD-10-CM

## 2022-03-15 DIAGNOSIS — Z13.1 SCREENING FOR DIABETES MELLITUS: ICD-10-CM

## 2022-03-15 DIAGNOSIS — Z13.6 ENCOUNTER FOR LIPID SCREENING FOR CARDIOVASCULAR DISEASE: ICD-10-CM

## 2022-03-15 DIAGNOSIS — G89.29 CHRONIC PAIN OF BOTH KNEES: Primary | ICD-10-CM

## 2022-03-15 DIAGNOSIS — E55.9 VITAMIN D DEFICIENCY: ICD-10-CM

## 2022-03-15 LAB
CHOLESTEROL/HDL RATIO: 4.8
CHOLESTEROL: 253 MG/DL
ESTIMATED AVERAGE GLUCOSE: 186 MG/DL
HBA1C MFR BLD: 8.1 % (ref 4–6)
HDLC SERPL-MCNC: 53 MG/DL
LDL CHOLESTEROL: 172 MG/DL (ref 0–130)
TRIGL SERPL-MCNC: 141 MG/DL
TSH SERPL DL<=0.05 MIU/L-ACNC: 1.79 MIU/L (ref 0.3–5)
VITAMIN D 25-HYDROXY: 14.7 NG/ML

## 2022-03-15 PROCEDURE — 82306 VITAMIN D 25 HYDROXY: CPT

## 2022-03-15 PROCEDURE — 83036 HEMOGLOBIN GLYCOSYLATED A1C: CPT

## 2022-03-15 PROCEDURE — 80061 LIPID PANEL: CPT

## 2022-03-15 PROCEDURE — 73565 X-RAY EXAM OF KNEES: CPT

## 2022-03-15 PROCEDURE — 73562 X-RAY EXAM OF KNEE 3: CPT

## 2022-03-15 PROCEDURE — 36415 COLL VENOUS BLD VENIPUNCTURE: CPT

## 2022-03-15 PROCEDURE — 84443 ASSAY THYROID STIM HORMONE: CPT

## 2022-03-17 ENCOUNTER — TELEPHONE (OUTPATIENT)
Dept: PRIMARY CARE CLINIC | Age: 62
End: 2022-03-17

## 2022-03-17 NOTE — LETTER
[facility]  [address]  Phone: [phone]  Fax: [phone] Blaine Hughes PA-C        March 17, 2022     [address] [place] [id number]      Dear Trang Kiser:    We missed seeing you for a scheduled appointment at [address] with Cassi Alvarado PA-C on 3/17/2022. We're sorry you were unable to keep your appointment and hope that you are doing well. We ask that you please call 24 hours in advance if you are unable to make your appointment, so that we can give that time to another patient in need. We care about you and the management of your healthcare and want to make sure that you follow up as recommended. To provide quality care and timely appointments to all our patients, you may be dismissed from the practice if you do not show for three (3) scheduled appointments within a 12-month period. We would like to continue treating your healthcare needs. Please call the office to reschedule your appointment, if needed.      Sincerely,        Cassi Alvarado PA-C English

## 2022-03-22 ENCOUNTER — OFFICE VISIT (OUTPATIENT)
Dept: ORTHOPEDIC SURGERY | Age: 62
End: 2022-03-22
Payer: MEDICARE

## 2022-03-22 VITALS — WEIGHT: 284 LBS | RESPIRATION RATE: 12 BRPM | BODY MASS INDEX: 42.06 KG/M2 | HEIGHT: 69 IN

## 2022-03-22 DIAGNOSIS — M17.11 ARTHRITIS OF KNEE, RIGHT: Primary | ICD-10-CM

## 2022-03-22 DIAGNOSIS — M17.12 ARTHRITIS OF KNEE, LEFT: ICD-10-CM

## 2022-03-22 PROCEDURE — G8427 DOCREV CUR MEDS BY ELIG CLIN: HCPCS | Performed by: PHYSICIAN ASSISTANT

## 2022-03-22 PROCEDURE — 99244 OFF/OP CNSLTJ NEW/EST MOD 40: CPT | Performed by: PHYSICIAN ASSISTANT

## 2022-03-22 PROCEDURE — 20610 DRAIN/INJ JOINT/BURSA W/O US: CPT | Performed by: PHYSICIAN ASSISTANT

## 2022-03-22 PROCEDURE — G8417 CALC BMI ABV UP PARAM F/U: HCPCS | Performed by: PHYSICIAN ASSISTANT

## 2022-03-22 PROCEDURE — G8484 FLU IMMUNIZE NO ADMIN: HCPCS | Performed by: PHYSICIAN ASSISTANT

## 2022-03-22 RX ORDER — BUPIVACAINE HYDROCHLORIDE 2.5 MG/ML
2 INJECTION, SOLUTION INFILTRATION; PERINEURAL ONCE
Status: COMPLETED | OUTPATIENT
Start: 2022-03-22 | End: 2022-03-22

## 2022-03-22 RX ORDER — METHYLPREDNISOLONE ACETATE 80 MG/ML
80 INJECTION, SUSPENSION INTRA-ARTICULAR; INTRALESIONAL; INTRAMUSCULAR; SOFT TISSUE ONCE
Status: COMPLETED | OUTPATIENT
Start: 2022-03-22 | End: 2022-03-22

## 2022-03-22 RX ADMIN — BUPIVACAINE HYDROCHLORIDE 5 MG: 2.5 INJECTION, SOLUTION INFILTRATION; PERINEURAL at 11:19

## 2022-03-22 RX ADMIN — METHYLPREDNISOLONE ACETATE 80 MG: 80 INJECTION, SUSPENSION INTRA-ARTICULAR; INTRALESIONAL; INTRAMUSCULAR; SOFT TISSUE at 11:18

## 2022-03-22 ASSESSMENT — ENCOUNTER SYMPTOMS
SHORTNESS OF BREATH: 0
VOMITING: 0
COUGH: 0
COLOR CHANGE: 0

## 2022-03-22 NOTE — PROGRESS NOTES
History:   Diagnosis Date    Diabetes mellitus ([facility] [address]     GERD (gastroesophageal reflux disease)     Hyperplastic polyp of intestine     Hypertension     Metabolic syndrome     PRE DIABETIC    Obesity        Past Surgical History:    Past Surgical History:   Procedure Laterality Date    BUNIONECTOMY Bilateral     COLONOSCOPY  5/28/15    BX RECTAL POLYP,HYPERPLASTIC POLYP    COLONOSCOPY N/A 3/4/2022    COLONOSCOPY DIAGNOSTIC performed by Isaias Brown MD at [facility] ARTHROSCOPY Right     [facility] LOC BREAST BIOPSY LEFT Left 2/10/2022    ROSE STEROTACTIC LOC BREAST BIOPSY LEFT 2/10/2022 [facility]       Current Medications:   Current Outpatient Medications   Medication Sig Dispense Refill    Handicap Pranav MISC Expires 7/1/2022 1 each 0    acetaminophen (TYLENOL) 325 MG tablet Take 650 mg by mouth every 6 hours as needed for Pain      metoprolol tartrate (LOPRESSOR) 25 MG tablet Take 1 tablet by mouth 2 times daily 60 tablet 2    magnesium citrate solution Take 592 mLs by mouth once for 1 dose Pt needs two bottles of Magnesium Citrate (Patient not taking: Reported on 3/4/2022) 592 mL 0    bisacodyl (DULCOLAX) 5 MG EC tablet TAKE 4 TABS AT 10 AM THE DAY PRIOR TO COLONOSCOPY (Patient not taking: Reported on 3/4/2022) 4 tablet 0    polyethylene glycol (GLYCOLAX) 17 GM/SCOOP powder Use as directed by following your patient instructions given by office. (Patient not taking: Reported on 3/4/2022) 238 g 0    albuterol sulfate HFA (PROAIR HFA) 108 (90 Base) MCG/ACT inhaler Inhale 2 puffs into the lungs every 6 hours as needed for Wheezing 18 g 0    ibuprofen (ADVIL;MOTRIN) 200 MG tablet Take 200 mg by mouth every 6 hours as needed for Pain       No current facility-administered medications for this visit. Allergies:    Patient has no known allergies.     Social History:   Social History     Socioeconomic History    Marital status:      Spouse name: Not on file    Number of children: Not on file    Years of education: Not on file    Highest education level: Not on file   Occupational History    Not on file   Tobacco Use    Smoking status: Never Smoker    Smokeless tobacco: Never Used   Vaping Use    Vaping Use: Never used   Substance and Sexual Activity    Alcohol use: No    Drug use: No    Sexual activity: Not on file   Other Topics Concern    Not on file   Social History Narrative    Not on file     Social Determinants of Health     Financial Resource Strain: Low Risk     Difficulty of Paying Living Expenses: Not hard at all   Food Insecurity: No Food Insecurity    Worried About [address] [place] [address] in the Last Year: Never true    Susana of Food in the Last Year: Never true   Transportation Needs: No Transportation Needs    Lack of Transportation (Medical): No    Lack of Transportation (Non-Medical):  No   Physical Activity:     Days of Exercise per Week: Not on file    Minutes of Exercise per Session: Not on file   Stress:     Feeling of Stress : Not on file   Social Connections:     Frequency of Communication with Friends and Family: Not on file    Frequency of Social Gatherings with Friends and Family: Not on file    Attends Shinto Services: Not on file    Active Member of [address] [place] [address] or Organizations: Not on file    Attends Club or Organization Meetings: Not on file    Marital Status: Not on file   Intimate Partner Violence:     Fear of Current or Ex-Partner: Not on file    Emotionally Abused: Not on file    Physically Abused: Not on file    Sexually Abused: Not on file   Housing Stability:     Unable to Pay for Housing in the Last Year: Not on file    Number of [place] in the Last Year: Not on file    Unstable Housing in the Last Year: Not on file       Family History:  Family History   Problem Relation Age of Onset    Breast Cancer Mother     Breast Cancer Paternal Aunt     Breast Cancer Paternal Aunt     Breast Cancer Paternal Aunt     Breast Cancer Paternal Aunt     Breast Cancer Paternal Aunt     Breast Cancer Paternal Aunt          REVIEW OF SYSTEMS:  Review of Systems   Constitutional: Negative for activity change and fever. HENT: Negative for sneezing. Respiratory: Negative for cough and shortness of breath. Cardiovascular: Negative for chest pain. Gastrointestinal: Negative for vomiting. Musculoskeletal: Positive for arthralgias (bilateral knee (R>L)). Negative for joint swelling and myalgias. Skin: Negative for color change. Neurological: Negative for weakness and numbness. Psychiatric/Behavioral: Negative for sleep disturbance. PHYSICAL EXAM:  Resp 12   Ht 5' 9\" (1.753 m)   Wt 284 lb (128.8 kg)   LMP 02/28/2015 (Approximate)   BMI 41.94 kg/m²  Body mass index is 41.94 kg/m². Physical Exam  Gen: alert and oriented to person and place. Psych:  Appropriate affect; Appropriate knowledge base; Appropriate mood; No hallucinations; Head: normocephalic, atraumatic   Chest: symmetric chest excursion; nonlabored respiratory effort. Pelvis: stable; no obvious pelvis deformity  Ortho Exam  Extremity: Patient ambulates independently with a antalgic gait appreciated. Evaluation of the Bilateral knee reveals no significant outward deformity. There are well-healed surgical portal subsidence noted on the anterior aspect of the right knee. There is no erythema, skin warmth, skin lesions, or signs of infection appreciated to the bilateral knee. There is tenderness over the medial joint line with palpation, bilaterally. There is a no appreciable knee effusion noted bilaterally. Range of motion of the Bilateral knee is 0-100. No instability of the knee is appreciated at 0 and 30° of flexion, bilaterally. There is a negative anterior drawer and Lachman's test, bilaterally. There is increased pain with medial Thiaog's testing, bilaterally. No calf tenderness is noted, bilaterally.   There is a negative hip log roll and Stinchfield test on the Bilateral hip. Motor, sensory, vascular examination to the Bilateral lower extremity is intact. Patient has full range of motion of the Bilateral ankle. Radiology:   XR KNEE LEFT (3 VIEWS)    Result Date: 3/15/2022  EXAMINATION: THREE XRAY VIEWS OF THE LEFT KNEE; ONE XRAY VIEWS OF THE BILATERAL KNEES; THREE XRAY VIEWS OF THE RIGHT KNEE 3/15/2022 10:41 am COMPARISON: None. HISTORY: ORDERING SYSTEM PROVIDED HISTORY: Chronic pain of both knees TECHNOLOGIST PROVIDED HISTORY: chronic left knee pain Reason for Exam: States B/L knee pain. Chronic pain. 77-year-old female with chronic bilateral knee pain FINDINGS: Right knee: Loose bodies along the outer medial compartment measuring up to 8 mm. Moderate to severe narrowing and degenerative change of the medial compartment. Moderate degenerative changes of the patellofemoral compartment. No sizable joint effusion. Diffuse osteopenia. Osseous alignment is normal.  No acute fracture or dislocation. No suspicious osteolytic or osteoblastic lesions. Left knee: Mild narrowing and osteophyte spurring of the medial and patellofemoral compartments. Small joint effusion. Diffuse osteopenia. No acute fracture or dislocation. No suspicious osteolytic or osteoblastic lesions. Osseous alignment is normal.     Right knee: 1. Loose bodies measuring up to 8 mm at the outer medial compartment. 2. Moderate to severe degenerative changes of the medial compartment. Moderate degenerative changes of the patellofemoral compartment. Diffuse osteopenia. 3. No acute fracture or dislocation. Left knee: 1. Mild degenerative changes of the medial and patellofemoral compartments. Small joint effusion. Diffuse osteopenia. 2. No acute fracture or dislocation.      XR KNEE RIGHT (3 VIEWS)    Result Date: 3/15/2022  EXAMINATION: THREE XRAY VIEWS OF THE LEFT KNEE; ONE XRAY VIEWS OF THE BILATERAL KNEES; THREE XRAY VIEWS OF THE RIGHT KNEE 3/15/2022 10:41 am COMPARISON: None. HISTORY: ORDERING SYSTEM PROVIDED HISTORY: Chronic pain of both knees TECHNOLOGIST PROVIDED HISTORY: chronic left knee pain Reason for Exam: States B/L knee pain. Chronic pain. 48-year-old female with chronic bilateral knee pain FINDINGS: Right knee: Loose bodies along the outer medial compartment measuring up to 8 mm. Moderate to severe narrowing and degenerative change of the medial compartment. Moderate degenerative changes of the patellofemoral compartment. No sizable joint effusion. Diffuse osteopenia. Osseous alignment is normal.  No acute fracture or dislocation. No suspicious osteolytic or osteoblastic lesions. Left knee: Mild narrowing and osteophyte spurring of the medial and patellofemoral compartments. Small joint effusion. Diffuse osteopenia. No acute fracture or dislocation. No suspicious osteolytic or osteoblastic lesions. Osseous alignment is normal.     Right knee: 1. Loose bodies measuring up to 8 mm at the outer medial compartment. 2. Moderate to severe degenerative changes of the medial compartment. Moderate degenerative changes of the patellofemoral compartment. Diffuse osteopenia. 3. No acute fracture or dislocation. Left knee: 1. Mild degenerative changes of the medial and patellofemoral compartments. Small joint effusion. Diffuse osteopenia. 2. No acute fracture or dislocation. XR KNEE BILATERAL STANDING    Result Date: 3/15/2022  EXAMINATION: THREE XRAY VIEWS OF THE LEFT KNEE; ONE XRAY VIEWS OF THE BILATERAL KNEES; THREE XRAY VIEWS OF THE RIGHT KNEE 3/15/2022 10:41 am COMPARISON: None. HISTORY: ORDERING SYSTEM PROVIDED HISTORY: Chronic pain of both knees TECHNOLOGIST PROVIDED HISTORY: chronic left knee pain Reason for Exam: States B/L knee pain. Chronic pain. 48-year-old female with chronic bilateral knee pain FINDINGS: Right knee: Loose bodies along the outer medial compartment measuring up to 8 mm.  Moderate to severe narrowing and degenerative change of the medial compartment. Moderate degenerative changes of the patellofemoral compartment. No sizable joint effusion. Diffuse osteopenia. Osseous alignment is normal.  No acute fracture or dislocation. No suspicious osteolytic or osteoblastic lesions. Left knee: Mild narrowing and osteophyte spurring of the medial and patellofemoral compartments. Small joint effusion. Diffuse osteopenia. No acute fracture or dislocation. No suspicious osteolytic or osteoblastic lesions. Osseous alignment is normal.     Right knee: 1. Loose bodies measuring up to 8 mm at the outer medial compartment. 2. Moderate to severe degenerative changes of the medial compartment. Moderate degenerative changes of the patellofemoral compartment. Diffuse osteopenia. 3. No acute fracture or dislocation. Left knee: 1. Mild degenerative changes of the medial and patellofemoral compartments. Small joint effusion. Diffuse osteopenia. 2. No acute fracture or dislocation. Procedure:  KNEE INJECTION PROCEDURE NOTE:  The patient was identified. Verbal consent was obtained to proceed with a Right  knee injection. The Right knee was confirmed with the patient. After a sterile prep with Betadine the knee was injected using a lateral joint line approach with a mixture of  2mL of 0.25% Marcaine and 80 mg of Depo-Medrol. Patient tolerated the procedure well without post injection complications. I instructed the patient to call our office immediately if they have any swelling or increased pain at the injection site. ASSESSMENT:     1. Arthritis of knee, right    2. Arthritis of knee, left         PLAN:       Today in office we discussed etiology and natural history of chronic bilateral knee pain due to osteoarthritis. I personally reviewed the patient's x-rays from today revealing moderate to severe degenerative changes within the bilateral knee.      The treatment options may include activity modification, oral anti-inflammatories, bracing, injections, advanced imaging, physical therapy and/or surgical intervention. We discussed that due to the patient's current hemoglobin A1c of 8.1 that was documented on 3/15/2022 and the fact that she is unmedicated in regards to by diabetes I would like to separate her bilateral knee injections by approximately 7 to 10 days so that she does not have a dramatic increase in her blood sugar levels. Patient is amenable to the above plan. The patient would like to proceed with:  1. Right knee corticosteroid injection. The patient was given a right knee corticosteroid injection today in office. She tolerated the procedure without complication. I did discuss with her that she needs to very closely monitor her blood sugar levels and to call her primary care physician or go to the emergency room if the get dangerously high. She noted her understanding. 2.  Patient was given a packet of home exercises to complete 3-4 times per week to work on lower extremity strengthening and range of motion restoration. 3.  Continue Tylenol and/or ibuprofen for her bilateral knee discomfort. 4.  Patient was given a prescription for a handicap placard that would  on 2022. The patient will follow up in 7-10 days for a left knee corticosteroid injection. We discussed that the patient should call us with any questions or concerns. The patient voiced her understanding. Return in about 9 days (around 3/31/2022) for left knee corticosteroid injection only.     Orders Placed This Encounter   Medications    methylPREDNISolone acetate (DEPO-MEDROL) injection 80 mg    bupivacaine (MARCAINE) 0.25 % injection 5 mg    Handicap Placard MISC     Sig: Expires 2022     Dispense:  1 each     Refill:  0       Orders Placed This Encounter   Procedures    47151 - DRAIN/INJECT LARGE JOINT BURSA       This note is created with the assistance of a speech recognition program.  While intending to generate a document that

## 2022-03-22 NOTE — LETTER
[facility]  [address] [facility] [address]  Phone: [phone]  Fax: [phone]           Carmina Chau      March 22, 2022     Patient: Jennifer Aldridge   MR Number: [id number]   YOB: 1960   Date of Visit: 3/22/2022       Dear Dr. Amber Keene: Thank you for referring Melissa Nash to me for evaluation/treatment. Below are the relevant portions of my assessment and plan of care. 1. Arthritis of knee, right    2. Arthritis of knee, left      Today in office we discussed etiology and natural history of chronic bilateral knee pain due to osteoarthritis. I personally reviewed the patient's x-rays from today revealing moderate to severe degenerative changes within the bilateral knee. The treatment options may include activity modification, oral anti-inflammatories, bracing, injections, advanced imaging, physical therapy and/or surgical intervention. We discussed that due to the patient's current hemoglobin A1c of 8.1 that was documented on 3/15/2022 and the fact that she is unmedicated in regards to by diabetes I would like to separate her bilateral knee injections by approximately 7 to 10 days so that she does not have a dramatic increase in her blood sugar levels. Patient is amenable to the above plan. The patient would like to proceed with:  1. Right knee corticosteroid injection. The patient was given a right knee corticosteroid injection today in office. She tolerated the procedure without complication. I did discuss with her that she needs to very closely monitor her blood sugar levels and to call her primary care physician or go to the emergency room if the get dangerously high. She noted her understanding. 2.  Patient was given a packet of home exercises to complete 3-4 times per week to work on lower extremity strengthening and range of motion restoration.   3.  Continue Tylenol and/or ibuprofen for her bilateral knee discomfort. 4.  Patient was given a prescription for a handicap placard that would  on 2022. The patient will follow up in 7-10 days for a left knee corticosteroid injection. We discussed that the patient should call us with any questions or concerns. The patient voiced her understanding. If you have questions, please do not hesitate to call me. I look forward to following Yang Giovany along with you. Sincerely,          Brittnee Joshua PA-C    CC providers:  Sarai Joaquin, [address] [place] [address]  Dr. Mary Ann Jimenez [address]  [facility] [address]  [employer]

## 2022-03-23 ENCOUNTER — OFFICE VISIT (OUTPATIENT)
Dept: PRIMARY CARE CLINIC | Age: 62
End: 2022-03-23
Payer: MEDICARE

## 2022-03-23 ENCOUNTER — TELEPHONE (OUTPATIENT)
Dept: PRIMARY CARE CLINIC | Age: 62
End: 2022-03-23

## 2022-03-23 ENCOUNTER — TELEPHONE (OUTPATIENT)
Dept: ONCOLOGY | Age: 62
End: 2022-03-23

## 2022-03-23 VITALS
RESPIRATION RATE: 16 BRPM | HEART RATE: 66 BPM | BODY MASS INDEX: 43.4 KG/M2 | OXYGEN SATURATION: 97 % | HEIGHT: 69 IN | WEIGHT: 293 LBS | SYSTOLIC BLOOD PRESSURE: 118 MMHG | DIASTOLIC BLOOD PRESSURE: 78 MMHG

## 2022-03-23 DIAGNOSIS — M21.961 FOOT DEFORMITY, BILATERAL: ICD-10-CM

## 2022-03-23 DIAGNOSIS — M17.0 PRIMARY OSTEOARTHRITIS OF BOTH KNEES: ICD-10-CM

## 2022-03-23 DIAGNOSIS — K21.9 GASTROESOPHAGEAL REFLUX DISEASE, UNSPECIFIED WHETHER ESOPHAGITIS PRESENT: ICD-10-CM

## 2022-03-23 DIAGNOSIS — E11.9 TYPE 2 DIABETES MELLITUS WITHOUT COMPLICATION, WITHOUT LONG-TERM CURRENT USE OF INSULIN (HCC): Primary | ICD-10-CM

## 2022-03-23 DIAGNOSIS — E78.00 PURE HYPERCHOLESTEROLEMIA: ICD-10-CM

## 2022-03-23 DIAGNOSIS — E66.01 MORBID OBESITY WITH BMI OF 40.0-44.9, ADULT (HCC): ICD-10-CM

## 2022-03-23 DIAGNOSIS — M21.962 FOOT DEFORMITY, BILATERAL: ICD-10-CM

## 2022-03-23 LAB
CREATININE URINE POCT: 300
MICROALBUMIN/CREAT 24H UR: 30 MG/G{CREAT}
MICROALBUMIN/CREAT UR-RTO: <30

## 2022-03-23 PROCEDURE — 3017F COLORECTAL CA SCREEN DOC REV: CPT | Performed by: PHYSICIAN ASSISTANT

## 2022-03-23 PROCEDURE — 2022F DILAT RTA XM EVC RTNOPTHY: CPT | Performed by: PHYSICIAN ASSISTANT

## 2022-03-23 PROCEDURE — 82044 UR ALBUMIN SEMIQUANTITATIVE: CPT | Performed by: PHYSICIAN ASSISTANT

## 2022-03-23 PROCEDURE — 99214 OFFICE O/P EST MOD 30 MIN: CPT | Performed by: PHYSICIAN ASSISTANT

## 2022-03-23 PROCEDURE — G8417 CALC BMI ABV UP PARAM F/U: HCPCS | Performed by: PHYSICIAN ASSISTANT

## 2022-03-23 PROCEDURE — G8484 FLU IMMUNIZE NO ADMIN: HCPCS | Performed by: PHYSICIAN ASSISTANT

## 2022-03-23 PROCEDURE — 3052F HG A1C>EQUAL 8.0%<EQUAL 9.0%: CPT | Performed by: PHYSICIAN ASSISTANT

## 2022-03-23 PROCEDURE — G8427 DOCREV CUR MEDS BY ELIG CLIN: HCPCS | Performed by: PHYSICIAN ASSISTANT

## 2022-03-23 PROCEDURE — 1036F TOBACCO NON-USER: CPT | Performed by: PHYSICIAN ASSISTANT

## 2022-03-23 RX ORDER — FLASH GLUCOSE SENSOR
KIT MISCELLANEOUS
Qty: 2 EACH | Refills: 0 | Status: SHIPPED | OUTPATIENT
Start: 2022-03-23 | End: 2022-03-30

## 2022-03-23 RX ORDER — FLASH GLUCOSE SENSOR
KIT MISCELLANEOUS
Qty: 1 EACH | Refills: 0 | Status: SHIPPED | OUTPATIENT
Start: 2022-03-23 | End: 2022-03-30

## 2022-03-23 RX ORDER — PANTOPRAZOLE SODIUM 20 MG/1
20 TABLET, DELAYED RELEASE ORAL
Qty: 30 TABLET | Refills: 0 | Status: SHIPPED | OUTPATIENT
Start: 2022-03-23 | End: 2022-03-30

## 2022-03-23 RX ORDER — METFORMIN HYDROCHLORIDE 500 MG/1
500 TABLET, EXTENDED RELEASE ORAL
Qty: 30 TABLET | Refills: 0 | Status: SHIPPED | OUTPATIENT
Start: 2022-03-23 | End: 2022-03-30

## 2022-03-23 ASSESSMENT — ENCOUNTER SYMPTOMS
ABDOMINAL PAIN: 0
DIARRHEA: 0
BACK PAIN: 0
CONSTIPATION: 0
VOMITING: 0
RHINORRHEA: 0
NAUSEA: 0
SHORTNESS OF BREATH: 0
SINUS PAIN: 0
COUGH: 0

## 2022-03-23 NOTE — PROGRESS NOTES
[facility] PRIMARY CARE  [facility] [address] [facility] [address]  [address] [address]  Dept: [phone]  Dept Fax: [phone]    Kwame Parker is a 60 y.o. female who presents today for her medical conditions/complaints as noted below. Chief Complaint   Patient presents with    Annual Exam     disucss labs, discuss starting weight loss medication       HPI:     Patient presents to the office to discuss lab results and recheck. Labs reviewed and demonstrate type 2 diabetes, elevated cholesterol, vitamin D deficiency. We had a lengthy discussion about diagnosis of diabetes. Patient does admit to poor dietary habits. She does not participate in regular physical activity. Since her last visit she has been evaluated by orthopedics and had right knee injection for osteoarthritis. She has not noticed significant benefit. Today, patient does report concern for burning in the throat, sour taste, heartburn, belching. Symptoms are worse with eating and lying down. She has tried over-the-counter antacids with minimal benefit. Denies vomiting or changes in stool habits. No other complaints or concerns. Patient is very motivated for weight loss and interested in weight loss medication. BP stable.         Hemoglobin A1C (%)   Date Value   03/15/2022 8.1 (H)             ( goal A1C is < 7)   No results found for: LABMICR  LDL Cholesterol (mg/dL)   Date Value   03/15/2022 172 (H)       (goal LDL is <100)   AST (U/L)   Date Value   01/15/2022 27     ALT (U/L)   Date Value   01/15/2022 14     BUN (mg/dL)   Date Value   01/15/2022 10     BP Readings from Last 3 Encounters:   22 118/78   22 107/71   22 134/86          (goal 120/80)    Past Medical History:   Diagnosis Date    Diabetes mellitus ([address]     GERD (gastroesophageal reflux disease)     Hyperplastic polyp of intestine     Hypertension     Metabolic syndrome     PRE DIABETIC    Obesity       Past Surgical History:   Procedure Laterality Date    BUNIONECTOMY Bilateral     COLONOSCOPY  5/28/15    BX RECTAL POLYP,HYPERPLASTIC POLYP    COLONOSCOPY N/A 3/4/2022    COLONOSCOPY DIAGNOSTIC performed by Aicha Beyer MD at [facility] ARTHROSCOPY Right     ROSE STEROTACTIC LOC BREAST BIOPSY LEFT Left 2/10/2022    ROSE STEROTACTIC LOC BREAST BIOPSY LEFT 2/10/2022 [facility]       Family History   Problem Relation Age of Onset    Breast Cancer Mother     Breast Cancer Paternal Aunt     Breast Cancer Paternal Aunt     Breast Cancer Paternal Aunt     Breast Cancer Paternal Aunt     Breast Cancer Paternal Aunt     Breast Cancer Paternal Aunt        Social History     Tobacco Use    Smoking status: Never Smoker    Smokeless tobacco: Never Used   Substance Use Topics    Alcohol use: No      Current Outpatient Medications   Medication Sig Dispense Refill    Dulaglutide 0.75 MG/0.5ML SOPN Inject 0.75 mg into the skin once a week 2 mL 0    metFORMIN (GLUCOPHAGE-XR) 500 MG extended release tablet Take 1 tablet by mouth daily (with breakfast) 30 tablet 0    Handicap Placard MISC by Does not apply route Dx: Osteoarthritis knees, foot deformity    Exp: 3/23/3027 1 each 0    pantoprazole (PROTONIX) 20 MG tablet Take 1 tablet by mouth every morning (before breakfast) 30 tablet 0    Continuous Blood Gluc  (FREESTYLE GABRIELLA READER) FREDDY Use with associated sensor 1 each 0    Continuous Blood Gluc Sensor (FREESTYLE GABRIELLA 14 DAY SENSOR) MISC Use with associated reader to monitor glucose continuously 2 each 0    acetaminophen (TYLENOL) 325 MG tablet Take 650 mg by mouth every 6 hours as needed for Pain      metoprolol tartrate (LOPRESSOR) 25 MG tablet Take 1 tablet by mouth 2 times daily 60 tablet 2    bisacodyl (DULCOLAX) 5 MG EC tablet TAKE 4 TABS AT 10 AM THE DAY PRIOR TO COLONOSCOPY 4 tablet 0    polyethylene glycol (GLYCOLAX) 17 GM/SCOOP powder Use as directed by following your patient instructions given by office. 238 g 0    albuterol sulfate HFA (PROAIR HFA) 108 (90 Base) MCG/ACT inhaler Inhale 2 puffs into the lungs every 6 hours as needed for Wheezing 18 g 0    ibuprofen (ADVIL;MOTRIN) 200 MG tablet Take 200 mg by mouth every 6 hours as needed for Pain       No current facility-administered medications for this visit. No Known Allergies    Health Maintenance   Topic Date Due    Hepatitis C screen  Never done    Pneumococcal 0-64 years Vaccine (1 of 2 - PPSV23) Never done    HIV screen  Never done    Diabetic retinal exam  Never done    Cervical cancer screen  Never done    Shingles Vaccine (1 of 2) Never done    COVID-19 Vaccine (3 - Booster for Pfizer series) 08/20/2021    Flu vaccine (1) 02/17/2023 (Originally 9/1/2021)    Potassium monitoring  01/15/2023    Creatinine monitoring  01/15/2023    Depression Screen  01/18/2023    Breast cancer screen  02/10/2023    A1C test (Diabetic or Prediabetic)  03/15/2023    Lipid screen  03/15/2023    Diabetic foot exam  03/23/2023    Diabetic microalbuminuria test  03/23/2023    Colorectal Cancer Screen  03/04/2025    DTaP/Tdap/Td vaccine (2 - Td or Tdap) 12/04/2028    Hepatitis A vaccine  Aged Out    Hib vaccine  Aged Out    Meningococcal (ACWY) vaccine  Aged Out       Subjective:      Review of Systems   Constitutional: Negative for chills, fatigue and fever. HENT: Negative for congestion, rhinorrhea and sinus pain. Respiratory: Negative for cough and shortness of breath. Cardiovascular: Negative for chest pain and leg swelling. Gastrointestinal: Negative for abdominal pain, constipation, diarrhea, nausea and vomiting.        + dyspepsia/gerd   Genitourinary: Negative for difficulty urinating, frequency and urgency. Musculoskeletal: Positive for arthralgias. Negative for back pain and myalgias. Neurological: Negative for dizziness and headaches.    Psychiatric/Behavioral: Negative for confusion, dysphoric mood and sleep disturbance. The patient is not nervous/anxious. All other systems reviewed and are negative. Objective:     Physical Exam  Vitals and nursing note reviewed. Constitutional:       General: She is not in acute distress. Appearance: Normal appearance. HENT:      Head: Normocephalic. Mouth/Throat:      Mouth: Mucous membranes are moist.   Eyes:      Extraocular Movements: Extraocular movements intact. Conjunctiva/sclera: Conjunctivae normal.      Pupils: Pupils are equal, round, and reactive to light. Cardiovascular:      Rate and Rhythm: Normal rate and regular rhythm. Pulses: Normal pulses. Heart sounds: Normal heart sounds. Pulmonary:      Effort: Pulmonary effort is normal.      Breath sounds: Normal breath sounds. Abdominal:      General: Abdomen is flat. Bowel sounds are normal.      Palpations: Abdomen is soft. Tenderness: There is no abdominal tenderness. Musculoskeletal:      Cervical back: Normal range of motion. Right lower leg: No edema. Left lower leg: No edema. Right foot: Normal range of motion. Deformity (flat) and bunion present. Left foot: Normal range of motion. Deformity (flat) and bunion present. Feet:      Right foot:      Protective Sensation: 6 sites tested. 6 sites sensed. Toenail Condition: Right toenails are normal.      Left foot:      Protective Sensation: 6 sites tested. 6 sites sensed. Toenail Condition: Left toenails are normal.   Lymphadenopathy:      Cervical: No cervical adenopathy. Skin:     General: Skin is warm. Capillary Refill: Capillary refill takes less than 2 seconds. Neurological:      General: No focal deficit present. Mental Status: She is alert and oriented to person, place, and time.    Psychiatric:         Mood and Affect: Mood normal.         Behavior: Behavior normal.       /78 (Site: Left Upper Arm, Position: Sitting, Cuff Size: Large Adult)   Pulse 66 Resp 16   Ht 5' 9\" (1.753 m)   Wt 297 lb 6.4 oz (134.9 kg)   LMP 02/28/2015 (Approximate)   SpO2 97%   Breastfeeding No   BMI 43.92 kg/m²     Assessment:       ICD-10-CM    1. Type 2 diabetes mellitus without complication, without long-term current use of insulin (HCC)  E11.9 POCT microalbumin     HM DIABETES FOOT EXAM     Dulaglutide 0.75 MG/0.5ML SOPN     metFORMIN (GLUCOPHAGE-XR) 500 MG extended release tablet     Fozia Salinas DPM, Podiatry, [place]     Continuous Blood Gluc  (FREESTYLE GABRIELLA READER) FREDDY     Continuous Blood Gluc Sensor (FREESTYLE GABRIELLA 14 DAY SENSOR) [facility]   2. Gastroesophageal reflux disease, unspecified whether esophagitis present  K21.9 pantoprazole (PROTONIX) 20 MG tablet   3. Morbid obesity with BMI of 40.0-44.9, adult ([facility]  E66.01     Z68.41    4. Pure hypercholesterolemia  E78.00    5. Foot deformity, bilateral  [facility], Fozia Rae DPM, Podiatry, [facility] pod [place]    S76.242 Handicap Placard [facility]   6. Primary osteoarthritis of both knees  M17.0 Handicap Placard MISC            Plan:       1. Patient with new diagnosis of type 2 diabetes. A1c of 8.1. She will be started on Metformin 500 mg once daily and Trulicity 0.62 mg once weekly. Diabetic foot exam within normal limits. Microalbumin within normal limits. We discussed the importance of tracking sugars with continuous glucose monitor. I had a long discussion with patient about the importance of dietary and physical activity habits. 2.  Patient was suspected GERD. Rx pantoprazole once daily in the morning with instructions. 3.  We discussed obesity as well on chronic underlying diseases. 4.  Plan to start patient on statin therapy at next visit. We will hold off on starting several medications at once. 5.  Patient given referral to podiatry for evaluation of foot deformity. 6.  Patient given order for handicap placard.   She has known osteoarthritis of the knees and difficulty with ambulation. Return in about 4 weeks (around 4/20/2022) for medication recheck. Orders Placed This Encounter   Procedures   Rashard Bowers DPM, Podiatry, [facility] Benitez     Referral Priority:   Routine     Referral Type:   Eval and Treat     Referral Reason:   Specialty Services Required     Referred to Provider:   Bobby Gonzales DPM     Requested Specialty:   Podiatry     Number of Visits Requested:   1    POCT microalbumin    HM DIABETES FOOT EXAM         Patient given educational materials - see patient instructions. Discussed use, benefit, and side effects of prescribed medications. All patient questions answered. Pt voiced understanding. Reviewed health maintenance. Instructed to continue current medications, diet and exercise. Patient agreed with treatment plan. Follow up as directed.         Electronically signed by John Gu PA-C on 3/24/2022 at 10:40 AM

## 2022-03-23 NOTE — TELEPHONE ENCOUNTER
Pt called in stating she was seen by her PCP today & given medication for diabetes but no meter to test her BS. Pt is requesting a Freestyle Toy be RX be sent into the pharmacy for her. Please advise.

## 2022-03-27 NOTE — PROGRESS NOTES
[address] [facility] - [address] [address]  [address]  Dept: [phone]           Patient: Cody Cloud  Primary Care Physician: Elaine Cabral PA-C     HPI: Cody Cloud is a 60 y.o. Christine Tali who presents today for her annual women's wellness exam. She is complaining of low libido. Reports she saw Dr. Ana María Souza at the [facility] & had a pap smear one year ago. It was abnormal but she did not follow up with recommended next steps because of covid. States she cycled monthly until last year. Denies vaginal bleeding in the past year. She was referred to this office by oncologist Dr. Adelina Kidd. Patient is seeing oncology due to elevated risk of breast cancer. Pt is post bx clip placement in the left breast with a plan for follow up imaging in 6 months. She does work outside of the home at a . She has 4 children. She is working on adding healthy habits to her routine. OBSTETRICAL & GYNECOLOGICAL HISTORY:  OB History    Para Term  AB Living   4 4 4 0 0 0   SAB IAB Ectopic Molar Multiple Live Births   0 0 0 0 0 0      # Outcome Date GA Lbr Zafar/2nd Weight Sex Delivery Anes PTL Lv   4 Term            3 Term            2 Term            1 Term              Breast density average, menarche 14years of age, first live birth 28 years, menopause 53 years, hysterectomy 20, breast biopsy 2/10/2022 came back as fibroadenoma. Last mammogram 2022. She has a history of post menopausal bleeding. Workup showed uterine fibroids. She does not currently report any VB    Not taking Calcium/Vitamin D supplement. Recommendations reviewed. Patient requests supplement as she feels her diet could be better. Vitamin D level drawn earlier this month and was low. Will check calcium level.      Sexually Active:  Yes with monogamous male partner  Dyspareunia: No  STD History: Yes trich long ago which was treated  Birth Control: post menopausal status    FAMILY HISTORY:  Family History of Breast, Ovarian, Colon or Uterine Cancer: Yes brother had colon cancer. family history includes Breast Cancer in her mother, paternal aunt, paternal aunt, paternal aunt, paternal aunt, paternal aunt, and paternal aunt. SOCIAL HISTORY:    reports that she has never smoked. She has never used smokeless tobacco. She reports that she does not drink alcohol and does not use drugs. MEDICAL HISTORY:  has No Known Allergies. CURRENT MEDS W/ ASSOC DIAG         Start Date End Date     acetaminophen (TYLENOL) 325 MG tablet  --  --     Associated Diagnoses:  --     albuterol sulfate HFA (PROAIR HFA) 108 (90 Base) MCG/ACT inhaler  01/15/22  --     Inhale 2 puffs into the lungs every 6 hours as needed for Wheezing     Associated Diagnoses:  --     bisacodyl (DULCOLAX) 5 MG EC tablet  02/11/22  --     TAKE 4 TABS AT 10 AM THE DAY PRIOR TO COLONOSCOPY     Associated Diagnoses:  --     Continuous Blood Gluc  (FREESTYLE GABRIELLA READER) FREDDY  03/23/22  --     Use with associated sensor     Associated Diagnoses:  Type 2 diabetes mellitus without complication, without long-term current use of insulin ([address]     Continuous Blood Gluc Sensor (FREESTYLE GABRIELLA 14 DAY SENSOR) [facility]  03/23/22  --     Use with associated reader to monitor glucose continuously     Associated Diagnoses:  Type 2 diabetes mellitus without complication, without long-term current use of insulin ([facility])     Dulaglutide 0.75 MG/0.5ML SOPN  03/23/22  --     Inject 0.75 mg into the skin once a week     Associated Diagnoses:  Type 2 diabetes mellitus without complication, without long-term current use of insulin ([address]     Handicap Placard [facility]  03/23/22  --     by Does not apply route Dx: Osteoarthritis knees, foot deformity    Exp: 3/23/3027     Associated Diagnoses:   Foot deformity, bilateral, Primary osteoarthritis of both knees     ibuprofen (ADVIL;MOTRIN) 200 MG tablet  --  --     Associated Diagnoses:  --     metFORMIN (GLUCOPHAGE-XR) 500 MG extended release tablet  03/23/22  --     Take 1 tablet by mouth daily (with breakfast)     Associated Diagnoses:  Type 2 diabetes mellitus without complication, without long-term current use of insulin (HCC)     metoprolol tartrate (LOPRESSOR) 25 MG tablet  02/17/22  --     Take 1 tablet by mouth 2 times daily     Associated Diagnoses:  Primary hypertension     pantoprazole (PROTONIX) 20 MG tablet  03/23/22  --     Take 1 tablet by mouth every morning (before breakfast)     Associated Diagnoses:  Gastroesophageal reflux disease, unspecified whether esophagitis present     polyethylene glycol (GLYCOLAX) 17 GM/SCOOP powder  02/11/22  --     Use as directed by following your patient instructions given by office. Associated Diagnoses:  --          has a past medical history of Diabetes mellitus ([facility]), GERD (gastroesophageal reflux disease), Hyperplastic polyp of intestine, Hypertension, Metabolic syndrome, and Obesity. has a past surgical history that includes Knee arthroscopy (Right); Bunionectomy (Bilateral); Colonoscopy (5/28/15); [facility] STEREO BREAST BX W LOC DEVICE 1ST LESION LEFT (Left, 2/10/2022); and Colonoscopy (N/A, 3/4/2022). HEALTH MAINTENANCE:  -Covid vaccine and booster recommended. Annual flu vaccine recommended October-April.   -Discussed Gardisil counseling for all patients 10-37 yo.  -Shingles vaccine:  2 doses Shingrix recommended for adults >50y. o, Single dose Zostavax live vaccine recommended for adults >57 y.o.    -Pap Smear collected today, last pap around 1 year ago, results unknown    -Mammogram due in 6 months.  Followed by high risk breast cancer clinic and oncology Last mammogram approximate date 2022 and was abnormal BIRADS 2    -Colonoscopy due: recommended starting at age 39: UTD    -DEXA due: recommended beginning at age 72 REVIEW OF SYSTEMS:   Review of Systems   All other systems reviewed and are negative. PHYSICAL EXAM:     Vitals:    03/29/22 1039   BP: 136/82   Position: Sitting   Cuff Size: Large Adult   Weight: 293 lb (132.9 kg)   Height: 5' 9.5\" (1.765 m)        Physical Exam  Constitutional:       General: She is awake. She is not in acute distress. Appearance: Normal appearance. She is well-developed and well-groomed. She is obese. She is not ill-appearing, toxic-appearing or diaphoretic. Genitourinary:      Urethral meatus normal.      Right Labia: No rash, tenderness, lesions, skin changes or Bartholin's cyst.     Left Labia: No tenderness, lesions, skin changes, Bartholin's cyst or rash. No vaginal discharge or tenderness. No cervical motion tenderness, discharge or friability. Breasts: Breasts are symmetrical.      Breasts are soft. Right: Present. No swelling, bleeding, inverted nipple, mass, nipple discharge, skin change, tenderness, breast implant, axillary adenopathy or supraclavicular adenopathy. Left: Present. No swelling, bleeding, inverted nipple, mass, nipple discharge, skin change, tenderness, breast implant, axillary adenopathy or supraclavicular adenopathy. HENT:      Head: Normocephalic and atraumatic. Nose: Nose normal.   Eyes:      Extraocular Movements: Extraocular movements intact. Pulmonary:      Effort: Pulmonary effort is normal.   Musculoskeletal:         General: Normal range of motion. Cervical back: Normal range of motion. Lymphadenopathy:      Upper Body:      Right upper body: No supraclavicular or axillary adenopathy. Left upper body: No supraclavicular or axillary adenopathy. Neurological:      General: No focal deficit present. Mental Status: She is alert and oriented to person, place, and time. Mental status is at baseline.    Psychiatric:         Attention and Perception: Attention and perception normal.         Mood and Affect: Mood normal.         Speech: Speech normal.         Behavior: Behavior normal. Behavior is cooperative. Thought Content: Thought content normal.         Cognition and Memory: Cognition and memory normal.         Judgment: Judgment normal.   Vitals reviewed. ASSESSMENT & PLAN:    Brook Copeland is a 60 y.o. female  here for her annual women's wellness exam. Today, we discussed    Diagnosis Orders   1. Encounter for gynecological examination  PAP SMEAR   2. Encounter for screening mammogram for breast cancer     3. Low libido     4. Late onset menopause  Calcium      Discussed behavioral changes that may help with low libido. Encouraged use of coconut oil as a lubrication, planning time for intimacy, etc. F/U in 3 months. Will check calcium level and plan to prescribe caltrate. Patient Active Problem List    Diagnosis Date Noted    Type 2 diabetes mellitus without complication, without long-term current use of insulin ([facility] 2022    Pure hypercholesterolemia 2022    Gastroesophageal reflux disease 2022    Primary osteoarthritis of both knees 2022    Morbid obesity with BMI of 40.0-44.9, adult ([facility] 2022    Chronic pain of both knees 2022    Primary hypertension 2022    Hyperplastic polyp of intestine         Return in about 3 months (around 2022), or low libido. Counseling Completed:  -Discussed recommendations to repeat pap as per American Society for Colposcopy and Cervical Pathology guidelines.  -Discussed need for mammograms every 1 year, If >44 yo and last mammogram was negative.  -Discussed Calcium and Vitamin D dosing.  -Discussed need for colonoscopy screening as well as onset for bone density testing.  -Discussed birth control and barrier recommendations.  Discussed STD counseling and prevention.  -Hereditary Breast, Ovarian, Colon and Uterine Cancer screening discussed.          -Tobacco & Secondary smoke risks discussed; with recommendation for cessation and avoidance.  -Routine health maintenance per patients PCP discussed. Return to this office annually and PRN.     The patient was seen and evaluated face to face by ELIZABETH Grimaldo CNP   3/29/2022, 12:00 PM

## 2022-03-28 ENCOUNTER — OFFICE VISIT (OUTPATIENT)
Dept: PODIATRY | Age: 62
End: 2022-03-28
Payer: MEDICARE

## 2022-03-28 VITALS — WEIGHT: 293 LBS | HEIGHT: 70 IN | BODY MASS INDEX: 41.95 KG/M2

## 2022-03-28 DIAGNOSIS — M20.41 HAMMER TOES OF BOTH FEET: ICD-10-CM

## 2022-03-28 DIAGNOSIS — M79.671 PAIN IN BOTH FEET: ICD-10-CM

## 2022-03-28 DIAGNOSIS — M20.42 HAMMER TOES OF BOTH FEET: ICD-10-CM

## 2022-03-28 DIAGNOSIS — M25.572 CHRONIC PAIN OF LEFT ANKLE: ICD-10-CM

## 2022-03-28 DIAGNOSIS — G89.29 CHRONIC PAIN OF LEFT ANKLE: ICD-10-CM

## 2022-03-28 DIAGNOSIS — M79.672 PAIN IN BOTH FEET: ICD-10-CM

## 2022-03-28 DIAGNOSIS — M84.364A STRESS FRACTURE OF LEFT FIBULA, INITIAL ENCOUNTER: ICD-10-CM

## 2022-03-28 DIAGNOSIS — I73.9 PVD (PERIPHERAL VASCULAR DISEASE) (HCC): ICD-10-CM

## 2022-03-28 DIAGNOSIS — E11.9 TYPE 2 DIABETES MELLITUS WITHOUT COMPLICATION, WITHOUT LONG-TERM CURRENT USE OF INSULIN (HCC): Primary | ICD-10-CM

## 2022-03-28 DIAGNOSIS — B35.1 DERMATOPHYTOSIS OF NAIL: ICD-10-CM

## 2022-03-28 DIAGNOSIS — R26.9 GAIT ABNORMALITY: ICD-10-CM

## 2022-03-28 PROCEDURE — 99204 OFFICE O/P NEW MOD 45 MIN: CPT | Performed by: PODIATRIST

## 2022-03-28 PROCEDURE — 11721 DEBRIDE NAIL 6 OR MORE: CPT | Performed by: PODIATRIST

## 2022-03-28 PROCEDURE — L4360 PNEUMAT WALKING BOOT PRE CST: HCPCS | Performed by: PODIATRIST

## 2022-03-28 PROCEDURE — 3052F HG A1C>EQUAL 8.0%<EQUAL 9.0%: CPT | Performed by: PODIATRIST

## 2022-03-28 PROCEDURE — 3017F COLORECTAL CA SCREEN DOC REV: CPT | Performed by: PODIATRIST

## 2022-03-28 PROCEDURE — G8417 CALC BMI ABV UP PARAM F/U: HCPCS | Performed by: PODIATRIST

## 2022-03-28 PROCEDURE — G8427 DOCREV CUR MEDS BY ELIG CLIN: HCPCS | Performed by: PODIATRIST

## 2022-03-28 PROCEDURE — G8484 FLU IMMUNIZE NO ADMIN: HCPCS | Performed by: PODIATRIST

## 2022-03-28 PROCEDURE — 2022F DILAT RTA XM EVC RTNOPTHY: CPT | Performed by: PODIATRIST

## 2022-03-28 PROCEDURE — 1036F TOBACCO NON-USER: CPT | Performed by: PODIATRIST

## 2022-03-28 NOTE — PROGRESS NOTES
[address]  Dept: [phone]    NEW PATIENT PROGRESS NOTE  Date of patient's visit: 3/28/2022  Patient's Name:  Ayleen Ravi YOB: 1960            Patient Care Team:  Comfort Lipscomb PA-C as PCP - General (Physician Assistant)  Comfort Lipscomb PA-C as PCP - [facility]  Chiki Cedeño MD as Consulting Physician (Gastroenterology)        Chief Complaint   Patient presents with    New Patient    Diabetes    Foot Pain    Nail Problem    Ankle Pain     left ankle         HPI:   Ayleen Ravi is a 60 y.o. female who presents to the office today complaining of diabetic foot care, left ankle pain, foot pain. Symptoms began 3 month(s) ago. Patient relates pain is Present. Pain is rated 5 out of 10 and is described as intermittent. Treatments prior to today's visit include: none. Currently denies F/C/N/V. Pt's primary care physician is Hoa Espana PA-C last seen march 23 2022     Pt also relates to a left ankle pain that has been hurting since her fall . She had x rays taken and has been using a splint     No Known Allergies    Past Medical History:   Diagnosis Date    Diabetes mellitus ([facility] [address]     GERD (gastroesophageal reflux disease)     Hyperplastic polyp of intestine     Hypertension     Metabolic syndrome     PRE DIABETIC    Obesity        Prior to Admission medications    Medication Sig Start Date End Date Taking? Authorizing Provider   Misc.  Devices MISC 1 PAIR OF DIABETIC SHOES (1 LEFT/ 1 RIGHT)  1-3 PAIRS OF INSERTS (LEFT/ RIGHT) 3/28/22  Yes Chirag Ferguson DPM   Dulaglutide 0.75 MG/0.5ML SOPN Inject 0.75 mg into the skin once a week 3/23/22  Yes Comfort Lipscomb PA-C   metFORMIN (GLUCOPHAGE-XR) 500 MG extended release tablet Take 1 tablet by mouth daily (with breakfast) 3/23/22  Yes Comfort Lipscomb PA-C   Handicap Placard MISC by Does not apply route Dx: Osteoarthritis knees, foot deformity    Exp: 3/23/3027 3/23/22  Yes Mandi Craig PA-C   pantoprazole (PROTONIX) 20 MG tablet Take 1 tablet by mouth every morning (before breakfast) 3/23/22  Yes Mandi Craig PA-C   Continuous Blood Gluc  (FREESTYLE GABRIELLA READER) FREDDY Use with associated sensor 3/23/22  Yes Mandi Craig PA-C   Continuous Blood Gluc Sensor (FREESTYLE GABRIELLA 14 DAY SENSOR) MISC Use with associated reader to monitor glucose continuously 3/23/22  Yes Mandi Craig PA-C   acetaminophen (TYLENOL) 325 MG tablet Take 650 mg by mouth every 6 hours as needed for Pain   Yes Historical Provider, MD   metoprolol tartrate (LOPRESSOR) 25 MG tablet Take 1 tablet by mouth 2 times daily 2/17/22  Yes Mandi Craig PA-C   bisacodyl (DULCOLAX) 5 MG EC tablet TAKE 4 TABS AT 10 AM THE DAY PRIOR TO COLONOSCOPY 2/11/22  Yes Ana Laura Ernandez MD   polyethylene glycol (GLYCOLAX) 17 GM/SCOOP powder Use as directed by following your patient instructions given by office.  2/11/22  Yes Pravin James MD   albuterol sulfate HFA (PROAIR HFA) 108 (90 Base) MCG/ACT inhaler Inhale 2 puffs into the lungs every 6 hours as needed for Wheezing 1/15/22  Yes Rose Mendoza PA-C   ibuprofen (ADVIL;MOTRIN) 200 MG tablet Take 200 mg by mouth every 6 hours as needed for Pain   Yes Historical Provider, MD       Past Surgical History:   Procedure Laterality Date    BUNIONECTOMY Bilateral     COLONOSCOPY  5/28/15    BX RECTAL POLYP,HYPERPLASTIC POLYP    COLONOSCOPY N/A 3/4/2022    COLONOSCOPY DIAGNOSTIC performed by Ana Laura Ernandez MD at [address] ARTHROSCOPY Right     [facility] BREAST BIOPSY LEFT Left 2/10/2022    [facility]SE [facility]OT[facility] LOC BREAST BIOPSY LEFT 2/10/2022 [facility]       Family History   Problem Relation Age of Onset    Breast Cancer Mother     Breast Cancer Paternal Aunt     Breast Cancer Paternal Aunt     Breast Cancer Paternal Aunt     Breast Cancer Paternal Aunt     Breast Cancer Paternal Aunt     Breast Cancer Paternal Aunt        Social History     Tobacco Use    Smoking status: Never Smoker    Smokeless tobacco: Never Used   Substance Use Topics    Alcohol use: No       Review of Systems    Review of Systems:   History obtained from chart review and the patient  General ROS: negative for - chills, fatigue, fever, night sweats or weight gain  Constitutional: Negative for chills, diaphoresis, fatigue, fever and unexpected weight change. Musculoskeletal: Positive for arthralgias, gait problem and joint swelling. Neurological ROS: negative for - behavioral changes, confusion, headaches or seizures. Negative for weakness and numbness. Dermatological ROS: negative for - mole changes, rash  Cardiovascular: Negative for leg swelling. Gastrointestinal: Negative for constipation, diarrhea, nausea and vomiting. Lower Extremity Physical Examination:   Vitals: There were no vitals filed for this visit. General: AAO x 3 in NAD. Dermatologic Exam:  Skin lesion/ulceration Absent . Skin No rashes or nodules noted. .   Skin is thin, with flaky sloughing skin as well as decreased hair growth to the lower leg  Small red hemosiderin deposits seen dorsal foot   Musculoskeletal:     1st MPJ ROM decreased, Bilateral.  Muscle strength 5/5, Bilateral.  Pain present upon palpation of toenails 1-5, Bilateral. decreased medial longitudinal arch, Bilateral.  Ankle ROM decreased,Bilateral.    Dorsally contracted digits present digits 2, Bilateral.     Vascular: DP pulses 1/4 bilateral.  PT pulses 0/4 bilateral.   CFT <5 seconds, Bilateral.  Hair growth absent to the level of the digits, Bilateral.  Edema present, Bilateral.  Varicosities absent, Bilateral. Erythema absent, Bilateral    Neurological: Sensation diminshed to light touch to level of digits, Bilateral.  Protective sensation intact 6/10 sites via 5.07/10g [place] Monofilament, Bilateral.  negative Tinel's, Bilateral. diagnosis and related symptoms this equipment is medically necessary for treatment. The function of this device is to restrict and limit motion, provide stabilization, immobilization, and compression to the affected area to reduce swelling. The goals and function of this device was explained in detail to the patient. Upon gait analysis, the device appeared to be fitting well and the patient states that the device is comfortable at this time. The patient was shown and told in detail how to properly apply, remove, wear and care for the device. Patient was able to apply the device properly and was able to ambulate without distress. At the time the device was dispensed, it was suitable for the condition and was not substandard. No guarantees were given and precautions were reviewed. Patient was instructed not to drive a car when wearing this device. All questions were answered. Written instructions and warranty information was given along with the list of the thirty (30) Durable Medical Equipment Supplier Guidelines. All questions were answered. Discussed signs and symptoms of deep venous thrombosis and pulmonary embolus. Call if they occur. We also discussed mechanical methods to prevent deep venous thrombosis and pulmonary embolus      x rays left ankle show the above results     since we have tried NSAID, immobilization,  RICE therapy physical therapy and the symptoms have not improved we will need to order an MRI of the affected limb to assess the area        Patient was fitted in their diabetic shoes and inserts today. I feel that these appliances are medically necessary for my patients well being and in my opinion are both reasonable and necessary to the accepted medical practice in the treatment of the above conditions.    Diabetic  shoes prevent the frictional forces on the feet and  from the feet being overloaded and decrease plantar presssure to the forefoot Abnormal foot/leg functions demands mechanical, functional protections and control. Without the diabetic shoes and inserts, the patient may develop an ulcer to the forefoot. Later on in life, the patient may develop an ulcer which leads to osteomyelitis and infections. These shoes accommodate the toe deformities    Description of the devices: These devices prevent ulcerations on diabetics  Due to the nature of my patients condition, I feel that this therapy is necessary indefinitely, as this is a form of continuous therapy. This is NOT a convenience item. It is my opinion that these devices will prevent future problems with the patients foot deformities         Nails 1,2,3,4,5 Right and 1,2,3,4,5 Left were debrided and ground smooth with a dremmel. The patient tolerated the procedure well without apparent complications. ..  Verbal and written instructions given to patient. Contact office with any questions/problems/concerns. RTC in 2week(s). All labs were reviewed and all imagining including the above findings were reviewed PRIOR to the patients arrival and with the patient today. Previous patient encounter was reviewed. Encounters from the patients other medical providers were reviewed and noted. Time was spent educating the patient and their families/caregivers on proper care of the feet and ankles. All the above diagnosis were addressed at todays visit and all questions were answered.   A total of 45 minutes was spent with this patients encounter which included charting after the patients visit    3/28/2022    Electronically signed by Chico Mijares DPM on 3/28/2022 at 1:32 PM  3/28/2022

## 2022-03-29 ENCOUNTER — HOSPITAL ENCOUNTER (OUTPATIENT)
Age: 62
Setting detail: SPECIMEN
Discharge: HOME | End: 2022-03-29

## 2022-03-29 ENCOUNTER — OFFICE VISIT (OUTPATIENT)
Dept: OBGYN CLINIC | Age: 62
End: 2022-03-29
Payer: MEDICARE

## 2022-03-29 VITALS
DIASTOLIC BLOOD PRESSURE: 82 MMHG | WEIGHT: 293 LBS | HEIGHT: 70 IN | SYSTOLIC BLOOD PRESSURE: 136 MMHG | BODY MASS INDEX: 41.95 KG/M2

## 2022-03-29 DIAGNOSIS — R68.82 LOW LIBIDO: ICD-10-CM

## 2022-03-29 DIAGNOSIS — K21.9 GASTROESOPHAGEAL REFLUX DISEASE, UNSPECIFIED WHETHER ESOPHAGITIS PRESENT: ICD-10-CM

## 2022-03-29 DIAGNOSIS — E11.9 TYPE 2 DIABETES MELLITUS WITHOUT COMPLICATION, WITHOUT LONG-TERM CURRENT USE OF INSULIN (HCC): ICD-10-CM

## 2022-03-29 DIAGNOSIS — Z78.0 LATE ONSET MENOPAUSE: ICD-10-CM

## 2022-03-29 DIAGNOSIS — Z01.419 ENCOUNTER FOR GYNECOLOGICAL EXAMINATION: Primary | ICD-10-CM

## 2022-03-29 DIAGNOSIS — Z12.31 ENCOUNTER FOR SCREENING MAMMOGRAM FOR BREAST CANCER: ICD-10-CM

## 2022-03-29 PROCEDURE — G8484 FLU IMMUNIZE NO ADMIN: HCPCS

## 2022-03-29 PROCEDURE — 99386 PREV VISIT NEW AGE 40-64: CPT

## 2022-03-29 RX ORDER — CA/D3/MAG OX/ZINC/COP/MANG/BOR 600 MG-800
1 TABLET,CHEWABLE ORAL DAILY
Qty: 60 TABLET | Refills: 6 | Status: SHIPPED
Start: 2022-03-29 | End: 2022-03-29 | Stop reason: CLARIF

## 2022-03-29 RX ORDER — ERGOCALCIFEROL 1.25 MG/1
50000 CAPSULE ORAL WEEKLY
Qty: 12 CAPSULE | Refills: 1 | Status: CANCELLED | OUTPATIENT
Start: 2022-03-29

## 2022-03-29 RX ORDER — CA/D3/MAG OX/ZINC/COP/MANG/BOR 600 MG-800
1 TABLET,CHEWABLE ORAL DAILY
Qty: 60 TABLET | Refills: 6 | Status: SHIPPED | OUTPATIENT
Start: 2022-03-29

## 2022-03-29 NOTE — PATIENT INSTRUCTIONS
Patient Education      Patient Education     Learning About High-Vitamin D Foods  What foods are high in vitamin D? The foods you eat contain nutrients, such as vitamins and minerals. Vitamin D is a nutrient. Your body needs the right amount to stay healthy and work as it should. You can use the list below to help you make choices about which foodsto eat. Here are some foods that contain vitamin D. Fruits   Orange juice, fortified with vitamin D  Grains   Cereals, fortified with vitamin D  Dairy and dairy alternatives   Milk, fortified with vitamin D   Non-dairy milk (almond, rice, soy), fortified with vitamin D   Yogurt, fortified with vitamin D  Protein foods   Flounder  Verizon   Sardines   [place]   Sole   Trout   Tuna  Fats   Cod liver oil  Work with your doctor to find out how much of this nutrient you need. Dependingon your health, you may need more or less of it in your diet. Where can you learn more? Go to [email or web address]://[email or web address] org and sign in to your [employer] account. Enter [phone] in the [employer] box to learn more about \"Learning About High-Vitamin D Foods. \"     If you do not have an account, please click on the \"Sign Up Now\" link. Current as of: September 8, 2021               Content Version: 13.2  © 2006-2022 Healthwise, Incorporated. Care instructions adapted under license by [facility] ([facility]). If you have questions about a medical condition or this instruction, always ask your healthcare professional. [address] any warranty or liability for your use of this information. Learning About High-Calcium Foods  What foods are high in calcium? The foods you eat contain nutrients, such as vitamins and minerals. Calcium is a nutrient. Your body needs the right amount to stay healthy and work as it should. You can use the list below to help you make choices about which foodsto eat.   Here are some foods that contain calcium. Fruits   Figs, dried   Orange juice, fortified with calcium  Vegetables   Bok pauline   Broccoli   Matilde greens   Kale  Grains   Cereals, fortified with calcium  Dairy and dairy alternatives   Cheese   Milk   Non-dairy milk (almond, rice, soy), fortified with calcium   Yogurt  Protein foods   Almonds   [place] or sardines, canned with bones   Soybeans   Tofu, fortified with calcium  Work with your doctor to find out how much of this nutrient you need. Dependingon your health, you may need more or less of it in your diet. Where can you learn more? Go to https://[email or web address].[email or web address] org and sign in to your [employer] account. Enter [id number] in the [employer] box to learn more about \"Learning About High-Calcium Foods. \"     If you do not have an account, please click on the \"Sign Up Now\" link. Current as of: September 8, 2021               Content Version: 13.2  © 2006-2022 Healthwise, Incorporated. Care instructions adapted under license by [facility] ([facility]). If you have questions about a medical condition or this instruction, always ask your healthcare professional. [place] [address] any warranty or liability for your use of this information.

## 2022-03-30 LAB
HPV SAMPLE: NORMAL
HPV, GENOTYPE 16: NOT DETECTED
HPV, GENOTYPE 18: NOT DETECTED
HPV, HIGH RISK OTHER: NOT DETECTED
HPV, INTERPRETATION: NORMAL
SPECIMEN DESCRIPTION: NORMAL

## 2022-03-30 RX ORDER — FLASH GLUCOSE SCANNING READER
EACH MISCELLANEOUS
Qty: 6 EACH | Refills: 3 | Status: SHIPPED | OUTPATIENT
Start: 2022-03-30

## 2022-03-30 RX ORDER — PANTOPRAZOLE SODIUM 20 MG/1
TABLET, DELAYED RELEASE ORAL
Qty: 30 TABLET | Refills: 2 | Status: SHIPPED | OUTPATIENT
Start: 2022-03-30

## 2022-03-30 RX ORDER — FLASH GLUCOSE SENSOR
KIT MISCELLANEOUS
Qty: 100 EACH | Refills: 3 | Status: SHIPPED | OUTPATIENT
Start: 2022-03-30

## 2022-03-30 RX ORDER — METFORMIN HYDROCHLORIDE 500 MG/1
TABLET, EXTENDED RELEASE ORAL
Qty: 30 TABLET | Refills: 2 | Status: SHIPPED | OUTPATIENT
Start: 2022-03-30

## 2022-03-30 RX ORDER — DULAGLUTIDE 0.75 MG/.5ML
INJECTION, SOLUTION SUBCUTANEOUS
Qty: 2 ML | Refills: 0 | Status: SHIPPED | OUTPATIENT
Start: 2022-03-30

## 2022-03-31 ENCOUNTER — PROCEDURE VISIT (OUTPATIENT)
Dept: ORTHOPEDIC SURGERY | Age: 62
End: 2022-03-31
Payer: MEDICARE

## 2022-03-31 VITALS — RESPIRATION RATE: 12 BRPM | BODY MASS INDEX: 41.95 KG/M2 | WEIGHT: 293 LBS | HEIGHT: 70 IN

## 2022-03-31 DIAGNOSIS — M17.12 ARTHRITIS OF KNEE, LEFT: Primary | ICD-10-CM

## 2022-03-31 PROCEDURE — G8484 FLU IMMUNIZE NO ADMIN: HCPCS | Performed by: PHYSICIAN ASSISTANT

## 2022-03-31 PROCEDURE — G8417 CALC BMI ABV UP PARAM F/U: HCPCS | Performed by: PHYSICIAN ASSISTANT

## 2022-03-31 PROCEDURE — 3017F COLORECTAL CA SCREEN DOC REV: CPT | Performed by: PHYSICIAN ASSISTANT

## 2022-03-31 PROCEDURE — 1036F TOBACCO NON-USER: CPT | Performed by: PHYSICIAN ASSISTANT

## 2022-03-31 PROCEDURE — 20610 DRAIN/INJ JOINT/BURSA W/O US: CPT | Performed by: PHYSICIAN ASSISTANT

## 2022-03-31 PROCEDURE — G8427 DOCREV CUR MEDS BY ELIG CLIN: HCPCS | Performed by: PHYSICIAN ASSISTANT

## 2022-03-31 RX ORDER — METHYLPREDNISOLONE ACETATE 80 MG/ML
80 INJECTION, SUSPENSION INTRA-ARTICULAR; INTRALESIONAL; INTRAMUSCULAR; SOFT TISSUE ONCE
Status: COMPLETED | OUTPATIENT
Start: 2022-03-31 | End: 2022-03-31

## 2022-03-31 RX ORDER — BUPIVACAINE HYDROCHLORIDE 2.5 MG/ML
2 INJECTION, SOLUTION INFILTRATION; PERINEURAL ONCE
Status: COMPLETED | OUTPATIENT
Start: 2022-03-31 | End: 2022-03-31

## 2022-03-31 RX ADMIN — BUPIVACAINE HYDROCHLORIDE 5 MG: 2.5 INJECTION, SOLUTION INFILTRATION; PERINEURAL at 10:51

## 2022-03-31 RX ADMIN — METHYLPREDNISOLONE ACETATE 80 MG: 80 INJECTION, SUSPENSION INTRA-ARTICULAR; INTRALESIONAL; INTRAMUSCULAR; SOFT TISSUE at 10:53

## 2022-03-31 ASSESSMENT — ENCOUNTER SYMPTOMS
SHORTNESS OF BREATH: 0
COLOR CHANGE: 0
VOMITING: 0
COUGH: 0

## 2022-03-31 NOTE — PROGRESS NOTES
[address] [facility] ORTHOPEDICS AND SPORTS MEDICINE  [address] [place] [address] [address]  Dept: [phone]  Dept Fax: [phone]        Procedure Note      Subjective:   Thuy Leonardo is a 60y.o. year old female who presents to our office today for routine followup regarding her   1. Arthritis of knee, left        Chief Complaint   Patient presents with    Knee Pain     left        HPI Thuy Leonardo presents to the office today for follow-up after corticosteroid  injection of the right knee. The patient notes 90% improvement with the injection. She is here today for previously scheduled left knee corticosteroid injection. Patient was not given both corticosteroid injections at the same time due to the fact that she has a hemoglobin A1c of 8.1 that was last documented on 3/15/2022. She noted very mild increase in her blood glucose levels with the corticosteroid injection, but it was only for 1 to 2 days. She denies numbness and or tingling in the left lower extremity at this time. Review of Systems   Constitutional: Negative for activity change and fever. HENT: Negative for sneezing. Respiratory: Negative for cough and shortness of breath. Cardiovascular: Negative for chest pain. Gastrointestinal: Negative for vomiting. Musculoskeletal: Positive for arthralgias (left knee). Negative for joint swelling and myalgias. Skin: Negative for color change. Neurological: Negative for weakness and numbness. Psychiatric/Behavioral: Negative for sleep disturbance. Objective :   Resp 12   Ht 5' 9.5\" (1.765 m)   Wt 293 lb (132.9 kg)   LMP 02/28/2015 (Approximate)   BMI 42.65 kg/m²  Body mass index is 42.65 kg/m². General: Thuy Leonardo is a 60 y.o. female who is alert and oriented and sitting comfortably in our office.   Ortho Exam  MS: Evaluation of the left knee reveals no obvious deformity, erythema, ecchymosis, new skin lesions or signs of infection. No active effusion noted today. AROM left knee: 5-95. Motor, sensory, vascular examination to the left lower extremities grossly intact without focal deficits. Neuro: alert and oriented to person and place. Eyes: Extra-ocular muscles intact  Mouth: Oral mucosa moist. No perioral lesions  Pulm: Respirations unlabored and regular. Symmetric chest excursion without outward deformity is noted. Skin: warm, well perfused  Psych:   Patient has good fund of knowledge and displays understanging of exam, diagnosis, and plan. Radiology:     XR KNEE LEFT (3 VIEWS)    Result Date: 3/15/2022  EXAMINATION: THREE XRAY VIEWS OF THE LEFT KNEE; ONE XRAY VIEWS OF THE BILATERAL KNEES; THREE XRAY VIEWS OF THE RIGHT KNEE 3/15/2022 10:41 am COMPARISON: None. HISTORY: ORDERING SYSTEM PROVIDED HISTORY: Chronic pain of both knees TECHNOLOGIST PROVIDED HISTORY: chronic left knee pain Reason for Exam: States B/L knee pain. Chronic pain. 57-year-old female with chronic bilateral knee pain FINDINGS: Right knee: Loose bodies along the outer medial compartment measuring up to 8 mm. Moderate to severe narrowing and degenerative change of the medial compartment. Moderate degenerative changes of the patellofemoral compartment. No sizable joint effusion. Diffuse osteopenia. Osseous alignment is normal.  No acute fracture or dislocation. No suspicious osteolytic or osteoblastic lesions. Left knee: Mild narrowing and osteophyte spurring of the medial and patellofemoral compartments. Small joint effusion. Diffuse osteopenia. No acute fracture or dislocation. No suspicious osteolytic or osteoblastic lesions. Osseous alignment is normal.     Left knee: 1. Mild degenerative changes of the medial and patellofemoral compartments. Small joint effusion. Diffuse osteopenia. 2. No acute fracture or dislocation.        Procedure:  KNEE INJECTION PROCEDURE NOTE:  The patient was

## 2022-04-01 LAB — CYTOLOGY REPORT: NORMAL

## 2022-04-07 ENCOUNTER — TELEPHONE (OUTPATIENT)
Dept: PRIMARY CARE CLINIC | Age: 62
End: 2022-04-07

## 2022-04-07 NOTE — TELEPHONE ENCOUNTER
Call made to pt to schedule appt for diabetic foot exam to complete forms for [facility] [employer], writer spoke with pt and appt is scheduled for 04/12/2022.

## 2022-04-14 NOTE — TELEPHONE ENCOUNTER
[facility]   Hereditary Cancer Risk Assessment     Name: Nima Reich  YOB: 1960  Date of Results Disclosure: 3/23/22     HISTORY   Ms. Jacques Stacy was seen for genetic counseling at the request of Sana Rosas PA-C due to her family history of cancer. At that time, Ms. Jacques Stacy chose to pursue genetic testing via the CancerNext Expanded + RNA gene panel. These results were discussed with Ms. Domínguez via telephone. A summary of Ms. Domínguez's results and recommendations are below. RESULTS  [facility] [place] CancerNext-Expanded Panel + RNAinsight: NEGATIVE - NO CLINICALLY SIGNIFICANT MUTATIONS DETECTED   This panel included the analysis of 77 genes associated with hereditary cancer including: AIP, ALK, APC, SHAZIA, BAP1, BARD1, BLM, BMPR1A, BRCA1, BRCA2, BRIP1, CDC73, CDH1, CDK4, CDKN1B, CDKN2A, CHEK2, CTNNA1, DICER1, EGFR, EGLN1, EPCAM, FANCC, FH, FLCN, GALNT12, GREM1, HOXB13, KIF1B, KIT1, LZTR1, MAX, MEN1, MET, MITF, MLH1, MSH2, MSH3, MSH6, MUTYH, NBN, NF1, NF2, NTHL1, PALB2, PDGFRA, PHOX2B, PMS2, POLD1, POLE, POT1, TBTRT9S, PTCH1, PTEN, RAD51C, RAD51D, RB1, RECQL, RET, SDHA, SDHAF2, SDHB, SDHC, ,SDHD, SMAD4, SMARCA4, SMARCB1, SMARCE1, STK11, SUFU, UWLB243, TP53, TSC1, TSC2, VHL, and XRCC2. In addition, no clinically relevant aberrant RNA transcripts were detected in select genes. Please refer to genetic test report for technical details. We discussed that Ms. Domínguez's negative test result greatly reduces the likelihood that she carries a hereditary gene mutation. However, it is possible that her family history of cancer is due to a hereditary mutation which she did not inherit. It is also possible that her family history of cancer may be due to a gene for which testing was not performed or which has yet to be discovered. RECOMMENDATIONS  1) While Ms. Jacques Stacy does not carry a known hereditary gene mutation, her risk for breast cancer may still be elevated due to her remaining family history of breast cancer. Based on Ms. Domínguez's personal risk factors and family history of breast cancer, her estimated lifetime risk for breast cancer is 26.6% according to the [employer] risk model. The  [facility] (NCCN) recommends that women with a lifetime risk of breast cancer 20% or higher consider the following screening and risk reducing options:     NCCN Recommendation Age to Begin Frequency    Breast awareness - Women should be familiar with their breasts and promptly report changes to their healthcare provider. Periodic, consistent breast self-examination (BSE) may be beneficial  Individualized  N/A    Clinical Breast Examination  Individualized Every 6-12 months   Breast MRI with contrast  36years old  Annual   Mammogram (consider tomosynthesis)  35years old  Annual    Consider risk reducing agents (i.e. Tamoxifen)  Individualized  N/A    *age to begin screening is based on the onset of breast cancer in Ms. Domínguez's family    2) Ms. Gracie Soto should continue general population cancer screening guidelines as directed by her physicians. RECOMMENDATIONS FOR FAMILY MEMBERS   1) Genetic testing is not recommended for Ms. Teixeiras children based on her negative test results. However, this recommendation does not take into consideration any family history of cancer in their paternal family. 2) It is possible that the cancers in Ms. Teixeiras family are due to a hereditary gene mutation that she did not inherit. Therefore, her relatives (particularly those with a current or previous cancer diagnosis) may consider genetic counseling and testing to clarify this possibility. Relatives may contact the [address] [place] [address] [employer] at [phone] or locate a genetic counselor at [email or web address].     3) We encourage Ms. Domínguez's relatives to discuss their family history of cancer with their physicians to determine the most appropriate cancer screening recommendations. Ms. Bhavya Hills female relatives may benefit from a formal breast cancer risk assessment by the [place] or [employer] risk models to determine if additional breast cancer screening is warranted. SUMMARY & PLAN   1) Ms. Domínguez's genetic test results are negative meaning there were no clinically significant mutations detected in the 77 genes analyzed. 2) Ms. Teixeiras lifetime risk for breast cancer is 26.6% according to the [employer] risk model. She may consider the NCCN guidelines outlined above for breast cancer surveillance. This includes annual breast MRI screening staggered 6 months apart from annual mammogram.  She is established with Dr. Harry Ortiz. She was reminded to complete her breast MRI in July 2022 and to follow up with Dr. Carlson Pollok shortly after. 3) Otherwise, there are no changes in medical management for Ms. Ramon Palma based on her negative genetic test results. 4) We encourage Ms. Domínguez to contact us every 1-2 years to determine if there are any new genetic testing or research options available. 5) We encourage Ms. Domínguez to contact us with updates to her personal and/or familys cancer history as this information may alter our assessment and/or recommendations. The [facility] [employer] [facility] include 234 beds at the [facility] National Program would be glad to offer our assistance should you have any questions or concerns about this information. Please feel free to contact us at [phone]. Joesph Oro, MS, [facility]   Licensed Genetic Counselor         CC:  Ms. Maury Diaz, [place]

## 2022-07-14 DIAGNOSIS — I10 PRIMARY HYPERTENSION: ICD-10-CM

## 2023-01-12 ENCOUNTER — TELEPHONE (OUTPATIENT)
Dept: PRIMARY CARE CLINIC | Age: 63
End: 2023-01-12
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
## 2023-01-12 NOTE — TELEPHONE ENCOUNTER
Please advise patient to get an appointment with orthopedics/podiatry as soon as possible for follow-up.  It is very atypical for cast to be left on for several months.   Also, I have not seen patient since March and she is a type II diabetic.  Please schedule her an appointment with me for follow-up on her chronic conditions.

## 2023-01-12 NOTE — TELEPHONE ENCOUNTER
Patient notified and verbalized understanding.  Scheduled with ortho on 1/13/23 and for diabetes f/u 1/25/23

## 2023-01-25 ENCOUNTER — TELEMEDICINE (OUTPATIENT)
Dept: PRIMARY CARE CLINIC | Age: 63
End: 2023-01-25
Payer: MEDICARE

## 2023-01-25 DIAGNOSIS — E66.01 MORBID OBESITY WITH BMI OF 40.0-44.9, ADULT (HCC): ICD-10-CM

## 2023-01-25 DIAGNOSIS — E11.9 TYPE 2 DIABETES MELLITUS WITHOUT COMPLICATION, WITHOUT LONG-TERM CURRENT USE OF INSULIN (HCC): Primary | ICD-10-CM

## 2023-01-25 DIAGNOSIS — E78.00 PURE HYPERCHOLESTEROLEMIA: ICD-10-CM

## 2023-01-25 DIAGNOSIS — M84.364S: ICD-10-CM

## 2023-01-25 PROCEDURE — 2022F DILAT RTA XM EVC RTNOPTHY: CPT | Performed by: PHYSICIAN ASSISTANT

## 2023-01-25 PROCEDURE — 3046F HEMOGLOBIN A1C LEVEL >9.0%: CPT | Performed by: PHYSICIAN ASSISTANT

## 2023-01-25 PROCEDURE — 99214 OFFICE O/P EST MOD 30 MIN: CPT | Performed by: PHYSICIAN ASSISTANT

## 2023-01-25 PROCEDURE — G8427 DOCREV CUR MEDS BY ELIG CLIN: HCPCS | Performed by: PHYSICIAN ASSISTANT

## 2023-01-25 PROCEDURE — 3017F COLORECTAL CA SCREEN DOC REV: CPT | Performed by: PHYSICIAN ASSISTANT

## 2023-01-25 RX ORDER — DULAGLUTIDE 0.75 MG/.5ML
INJECTION, SOLUTION SUBCUTANEOUS
Qty: 2 ML | Refills: 0 | Status: SHIPPED | OUTPATIENT
Start: 2023-01-25

## 2023-01-25 RX ORDER — FLASH GLUCOSE SENSOR
KIT MISCELLANEOUS
Qty: 2 EACH | Refills: 3 | Status: SHIPPED | OUTPATIENT
Start: 2023-01-25

## 2023-01-25 RX ORDER — METFORMIN HYDROCHLORIDE 500 MG/1
TABLET, EXTENDED RELEASE ORAL
Qty: 30 TABLET | Refills: 2 | Status: SHIPPED | OUTPATIENT
Start: 2023-01-25

## 2023-01-25 RX ORDER — FLASH GLUCOSE SCANNING READER
EACH MISCELLANEOUS
Qty: 1 EACH | Refills: 3 | Status: SHIPPED | OUTPATIENT
Start: 2023-01-25

## 2023-01-25 SDOH — ECONOMIC STABILITY: FOOD INSECURITY: WITHIN THE PAST 12 MONTHS, YOU WORRIED THAT YOUR FOOD WOULD RUN OUT BEFORE YOU GOT MONEY TO BUY MORE.: NEVER TRUE

## 2023-01-25 SDOH — ECONOMIC STABILITY: FOOD INSECURITY: WITHIN THE PAST 12 MONTHS, THE FOOD YOU BOUGHT JUST DIDN'T LAST AND YOU DIDN'T HAVE MONEY TO GET MORE.: NEVER TRUE

## 2023-01-25 ASSESSMENT — ENCOUNTER SYMPTOMS
CONSTIPATION: 0
VOMITING: 0
SHORTNESS OF BREATH: 0
SINUS PAIN: 0
BACK PAIN: 0
RHINORRHEA: 0
DIARRHEA: 0
ABDOMINAL PAIN: 0
COUGH: 0
NAUSEA: 0

## 2023-01-25 ASSESSMENT — PATIENT HEALTH QUESTIONNAIRE - PHQ9
SUM OF ALL RESPONSES TO PHQ QUESTIONS 1-9: 0
1. LITTLE INTEREST OR PLEASURE IN DOING THINGS: 0
SUM OF ALL RESPONSES TO PHQ9 QUESTIONS 1 & 2: 0
2. FEELING DOWN, DEPRESSED OR HOPELESS: 0

## 2023-01-25 ASSESSMENT — SOCIAL DETERMINANTS OF HEALTH (SDOH): HOW HARD IS IT FOR YOU TO PAY FOR THE VERY BASICS LIKE FOOD, HOUSING, MEDICAL CARE, AND HEATING?: NOT VERY HARD

## 2023-01-25 NOTE — PROGRESS NOTES
2023    TELEHEALTH EVALUATION -- Audio/Visual (During VWSTM-24 public health emergency)    HPI:    Farrah Garcia (:  1960) has requested an audio/video evaluation for the following concern(s):    Patient presents as a virtual visit for routine follow-up. She has past medical history including hypertension, GERD, type 2 diabetes, arthritis, obesity, hyperlipidemia. Patient is noncompliant with medical care. She has not been on diabetic meds for several months. She is not tracking blood glucose at home. Denies any new onset symptoms. No acute changes. Patient has not followed with podiatry in several months. She continues to have cast/walk boot. She was unsure of what to do with regards to her foot. She is an appoint with podiatry in 2 weeks. She has not completed any health maintenance in the last year. We discussed the importance of breast MRI due to her high breast cancer risk and following with oncology. No specific concerns today. Vital signs unavailable. Review of Systems   Constitutional:  Negative for chills, fatigue and fever. HENT:  Negative for congestion, rhinorrhea and sinus pain. Respiratory:  Negative for cough and shortness of breath. Cardiovascular:  Negative for chest pain and leg swelling. Gastrointestinal:  Negative for abdominal pain, constipation, diarrhea, nausea and vomiting. Genitourinary:  Negative for difficulty urinating, frequency and urgency. Musculoskeletal:  Positive for arthralgias. Negative for back pain and myalgias. Neurological:  Negative for dizziness and headaches. Psychiatric/Behavioral:  Negative for confusion, dysphoric mood and sleep disturbance. The patient is not nervous/anxious. All other systems reviewed and are negative. Prior to Visit Medications    Medication Sig Taking?  Authorizing Provider   Continuous Blood Gluc Sensor (FREESTYLE GABRIELLA 14 DAY SENSOR) [facility] USE TO MONITOR BLOOD SUGAR CONTINUOUSLY Yes Silviano Panchal JOVI Damon PA-C   Continuous Blood Gluc  (FREESTYLE GABRIELLA 14 DAY READER) [facility] USE WITH SENSOR TO MONITOR BLOOD SUGAR CONTINUOUSLY Yes Alexis Claudio PA-C   metFORMIN (GLUCOPHAGE-XR) 500 MG extended release tablet TAKE ONE TABLET BY MOUTH EVERY DAY WITH BREAKFAST Yes Alexis Claudio PA-C   Dulaglutide (TRULICITY) 0.92 MH/0.9QS SOPN INJECT 0.75 MG UNDER THE SKIN ONCE WEEKLY Yes Alexis Claudio PA-C   metoprolol tartrate (LOPRESSOR) 25 MG tablet TAKE ONE TABLET BY MOUTH TWICE A DAY Yes ELIZABETH Hodges CNP   pantoprazole (PROTONIX) 20 MG tablet TAKE ONE TABLET BY MOUTH EVERY MORNING BEFORE BREAKFAST Yes Alexis Claudio PA-C   Calcium Carbonate-Vit D-Min (CALTRATE 600+D PLUS MINERALS) 600-800 MG-UNIT CHEW Take 1 tablet by mouth daily Yes ELIZABETH Green CNP   Misc. Devices MISC 1 PAIR OF DIABETIC SHOES (1 LEFT/ 1 RIGHT)  1-3 PAIRS OF INSERTS (LEFT/ RIGHT) Yes Rosa Meyer DPM   Handicap Placard MISC by Does not apply route Dx: Osteoarthritis knees, foot deformity    Exp: 3/23/3027 Yes Alexis Claudio PA-C   acetaminophen (TYLENOL) 325 MG tablet Take 650 mg by mouth every 6 hours as needed for Pain Yes Historical Provider, MD   bisacodyl (DULCOLAX) 5 MG EC tablet TAKE 4 TABS AT 10 AM THE DAY PRIOR TO COLONOSCOPY Yes Pravin James MD   polyethylene glycol (GLYCOLAX) 17 GM/SCOOP powder Use as directed by following your patient instructions given by office.  Yes Jess Agee MD   ibuprofen (ADVIL;MOTRIN) 200 MG tablet Take 200 mg by mouth every 6 hours as needed for Pain Yes Historical Provider, MD       Social History     Tobacco Use    Smoking status: Never    Smokeless tobacco: Never   Vaping Use    Vaping Use: Never used   Substance Use Topics    Alcohol use: No    Drug use: No        No Known Allergies,   Past Medical History:   Diagnosis Date    Diabetes mellitus ([address]     GERD (gastroesophageal reflux disease)     Hyperplastic polyp of intestine     Hypertension     Metabolic syndrome PRE DIABETIC    Obesity    ,   Past Surgical History:   Procedure Laterality Date    BUNIONECTOMY Bilateral     COLONOSCOPY  5/28/15    BX RECTAL POLYP,HYPERPLASTIC POLYP    COLONOSCOPY N/A 3/4/2022    COLONOSCOPY DIAGNOSTIC performed by Sebastian Ramirez MD at [address] [place] [address] ARTHROSCOPY Right     ROSE STEROTACTIC LOC BREAST BIOPSY LEFT Left 2/10/2022    ROSE STEROTACTIC LOC BREAST BIOPSY LEFT 2/10/2022 [facility]   ,   Social History     Tobacco Use    Smoking status: Never    Smokeless tobacco: Never   Vaping Use    Vaping Use: Never used   Substance Use Topics    Alcohol use: No    Drug use: No   ,   Family History   Problem Relation Age of Onset    Breast Cancer Mother     Breast Cancer Paternal Aunt     Breast Cancer Paternal Aunt     Breast Cancer Paternal Aunt     Breast Cancer Paternal Aunt     Breast Cancer Paternal Aunt     Breast Cancer Paternal Aunt    ,   Immunization History   Administered Date(s) Administered    COVID-19, PFIZER PURPLE top, DILUTE for use, (age 15 y+), 30mcg/0.3mL 02/27/2021, 03/20/2021    MMR 12/04/2018    Tdap (Boostrix, Adacel) 12/04/2018       PHYSICAL EXAMINATION:  [ INSTRUCTIONS:  \"[x]\" Indicates a positive item  \"[]\" Indicates a negative item  -- DELETE ALL ITEMS NOT EXAMINED]  Vital Signs: (As obtained by patient/caregiver or practitioner observation)      Constitutional: [x] Appears well-developed and well-nourished [x] No apparent distress      [] Abnormal-   Mental status  [x] Alert and awake  [x] Oriented to person/place/time [x]Able to follow commands      Eyes:  EOM    [x]  Normal  [] Abnormal-  Sclera  [x]  Normal  [] Abnormal -         Discharge [x]  None visible  [] Abnormal -    HENT:   [x] Normocephalic, atraumatic.   [] Abnormal   [x] Mouth/Throat: Mucous membranes are moist.     External Ears [x] Normal  [] Abnormal-     Neck: [x] No visualized mass     Pulmonary/Chest: [x] Respiratory effort normal.  [x] No visualized signs of difficulty breathing or respiratory distress        [] Abnormal-      Musculoskeletal:   [x] Normal range of motion of neck        [] Abnormal-       Neurological:        [x] No Facial Asymmetry (Cranial nerve 7 motor function) (limited exam to video visit)          [x] No gaze palsy        [] Abnormal-         Skin:        [x] No significant exanthematous lesions or discoloration noted on facial skin         [] Abnormal-            Psychiatric:       [x] Normal Affect [x] No Hallucinations        [] Abnormal-     Other pertinent observable physical exam findings-     ASSESSMENT/PLAN:  1. Type 2 diabetes mellitus without complication, without long-term current use of insulin (HCC)  - Continuous Blood Gluc Sensor (Gordon GamesSTYLE GABRIELLA 14 DAY SENSOR) MISC; USE TO MONITOR BLOOD SUGAR CONTINUOUSLY  Dispense: 2 each; Refill: 3  - Continuous Blood Gluc  (FREESTYLE GABRIELLA 14 DAY READER) [facility]DY; USE WITH SENSOR TO MONITOR BLOOD SUGAR CONTINUOUSLY  Dispense: 1 each; Refill: 3  - metFORMIN (GLUCOPHAGE-XR) 500 MG extended release tablet; TAKE ONE TABLET BY MOUTH EVERY DAY WITH BREAKFAST  Dispense: 30 tablet; Refill: 2  - Dulaglutide (TRULICITY) 9.93 BA/8.2XD SOPN; INJECT 0.75 MG UNDER THE SKIN ONCE WEEKLY  Dispense: 2 mL; Refill: 0  - Hemoglobin A1C; Future  - Comprehensive Metabolic Panel, Fasting; Future  - CBC; Future    Patient will be given refill of metformin and Trulicity. We discussed the importance of compliance with diabetic medications. Advised on long-term complications. Plan to assess labs. Patient will follow-up closely in office. 2. Morbid obesity with BMI of 40.0-44.9, adult ([facility] [address]  I discussed with patient the importance of weight management with regards to chronic conditions. We discussed dietary modifications. 3. Pure hypercholesterolemia  - Lipid Panel; Future  Plan to reassess lipid panel with history of dyslipidemia and type 2 diabetes. She is currently not taking any medications.     4. Stress fracture, left fibula, sequela  Patient strongly advised to keep follow-up with podiatry. We discussed that it is atypical to leave cast/walk boot on for several months. Return in about 4 weeks (around 2/22/2023) for medication recheck, blood pressure, diabetes. Mally Lange, was evaluated through a synchronous (real-time) audio-video encounter. The patient (or guardian if applicable) is aware that this is a billable service, which includes applicable co-pays. This Virtual Visit was conducted with patient's (and/or legal guardian's) consent. The visit was conducted pursuant to the emergency declaration under the [facility], [facility] authority and the [employer] General Act. Patient identification was verified, and a caregiver was present when appropriate. The patient was located at Home: [address] [place] [address]. Provider was located at [facility] ([address] [place] [address]): [address] [place] [address],[address]  [address] Total time spent on this encounter: Not billed by time    --Colby Woodall PA-C on 1/25/2023 at 2:40 PM    An electronic signature was used to authenticate this note.

## 2023-01-26 ENCOUNTER — TELEPHONE (OUTPATIENT)
Dept: PRIMARY CARE CLINIC | Age: 63
End: 2023-01-26

## 2023-02-15 ENCOUNTER — OFFICE VISIT (OUTPATIENT)
Dept: PODIATRY | Age: 63
End: 2023-02-15

## 2023-02-15 VITALS — BODY MASS INDEX: 41.95 KG/M2 | WEIGHT: 293 LBS | HEIGHT: 70 IN

## 2023-02-15 DIAGNOSIS — M25.572 SINUS TARSI SYNDROME OF LEFT ANKLE: Primary | ICD-10-CM

## 2023-02-15 DIAGNOSIS — M25.572 ACUTE LEFT ANKLE PAIN: ICD-10-CM
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
## 2023-02-20 DIAGNOSIS — E11.9 TYPE 2 DIABETES MELLITUS WITHOUT COMPLICATION, WITHOUT LONG-TERM CURRENT USE OF INSULIN (HCC): ICD-10-CM

## 2023-02-21 RX ORDER — BETAMETHASONE SODIUM PHOSPHATE AND BETAMETHASONE ACETATE 3; 3 MG/ML; MG/ML
6 INJECTION, SUSPENSION INTRA-ARTICULAR; INTRALESIONAL; INTRAMUSCULAR; SOFT TISSUE ONCE
Status: COMPLETED | OUTPATIENT
Start: 2023-02-21 | End: 2023-02-24

## 2023-02-24 RX ADMIN — BETAMETHASONE SODIUM PHOSPHATE AND BETAMETHASONE ACETATE 6 MG: 3; 3 INJECTION, SUSPENSION INTRA-ARTICULAR; INTRALESIONAL; INTRAMUSCULAR; SOFT TISSUE at 10:35

## 2023-03-01 ENCOUNTER — OFFICE VISIT (OUTPATIENT)
Dept: PRIMARY CARE CLINIC | Age: 63
End: 2023-03-01

## 2023-03-01 VITALS
HEIGHT: 70 IN | HEART RATE: 76 BPM | BODY MASS INDEX: 41.92 KG/M2 | OXYGEN SATURATION: 96 % | SYSTOLIC BLOOD PRESSURE: 112 MMHG | WEIGHT: 292.8 LBS | RESPIRATION RATE: 16 BRPM | DIASTOLIC BLOOD PRESSURE: 62 MMHG

## 2023-03-01 DIAGNOSIS — Z12.31 ENCOUNTER FOR SCREENING MAMMOGRAM FOR MALIGNANT NEOPLASM OF BREAST: ICD-10-CM

## 2023-03-01 DIAGNOSIS — H93.8X3 EAR FULLNESS, BILATERAL: ICD-10-CM

## 2023-03-01 DIAGNOSIS — I10 PRIMARY HYPERTENSION: ICD-10-CM

## 2023-03-01 DIAGNOSIS — E11.9 TYPE 2 DIABETES MELLITUS WITHOUT COMPLICATION, WITHOUT LONG-TERM CURRENT USE OF INSULIN (HCC): Primary | ICD-10-CM

## 2023-03-01 DIAGNOSIS — N95.0 POSTMENOPAUSAL BLEEDING: ICD-10-CM

## 2023-03-01 DIAGNOSIS — E66.01 MORBID OBESITY WITH BMI OF 40.0-44.9, ADULT (HCC): ICD-10-CM

## 2023-03-01 LAB — HBA1C MFR BLD: 8.6 %

## 2023-03-01 RX ORDER — FLUTICASONE PROPIONATE 50 MCG
2 SPRAY, SUSPENSION (ML) NASAL DAILY
Qty: 16 G | Refills: 0 | Status: SHIPPED | OUTPATIENT
Start: 2023-03-01

## 2023-03-01 RX ORDER — CETIRIZINE HYDROCHLORIDE 10 MG/1
10 TABLET ORAL DAILY
Qty: 30 TABLET | Refills: 0 | Status: SHIPPED | OUTPATIENT
Start: 2023-03-01

## 2023-03-01 SDOH — ECONOMIC STABILITY: INCOME INSECURITY: HOW HARD IS IT FOR YOU TO PAY FOR THE VERY BASICS LIKE FOOD, HOUSING, MEDICAL CARE, AND HEATING?: NOT HARD AT ALL

## 2023-03-01 SDOH — ECONOMIC STABILITY: HOUSING INSECURITY
IN THE LAST 12 MONTHS, WAS THERE A TIME WHEN YOU DID NOT HAVE A STEADY PLACE TO SLEEP OR SLEPT IN A SHELTER (INCLUDING NOW)?: NO

## 2023-03-01 SDOH — ECONOMIC STABILITY: FOOD INSECURITY: WITHIN THE PAST 12 MONTHS, YOU WORRIED THAT YOUR FOOD WOULD RUN OUT BEFORE YOU GOT MONEY TO BUY MORE.: NEVER TRUE

## 2023-03-01 SDOH — ECONOMIC STABILITY: FOOD INSECURITY: WITHIN THE PAST 12 MONTHS, THE FOOD YOU BOUGHT JUST DIDN'T LAST AND YOU DIDN'T HAVE MONEY TO GET MORE.: NEVER TRUE

## 2023-03-01 ASSESSMENT — ENCOUNTER SYMPTOMS
SHORTNESS OF BREATH: 0
ABDOMINAL PAIN: 0
DIARRHEA: 0
VOMITING: 0
COUGH: 0
NAUSEA: 0
RHINORRHEA: 0
SINUS PAIN: 0
BACK PAIN: 0
CONSTIPATION: 0

## 2023-03-01 ASSESSMENT — PATIENT HEALTH QUESTIONNAIRE - PHQ9
SUM OF ALL RESPONSES TO PHQ9 QUESTIONS 1 & 2: 0
2. FEELING DOWN, DEPRESSED OR HOPELESS: 0
SUM OF ALL RESPONSES TO PHQ QUESTIONS 1-9: 0
SUM OF ALL RESPONSES TO PHQ QUESTIONS 1-9: 0
1. LITTLE INTEREST OR PLEASURE IN DOING THINGS: 0
SUM OF ALL RESPONSES TO PHQ QUESTIONS 1-9: 0
SUM OF ALL RESPONSES TO PHQ QUESTIONS 1-9: 0

## 2023-03-01 NOTE — PROGRESS NOTES
[facility]  [facility] PRIMARY CARE  [address] [place] [address] [address]  [address]  [facility] [address]  Dept: [phone]  Dept Fax: [phone]    Mirtha Domínguez is a 63 y.o. female who presents today for her medical conditions/complaints as noted below.    Chief Complaint   Patient presents with    Diabetes    Health Maintenance     Due for diabetic eye exam and Pneumonia    Ear Problem     Feels like ears are full     Other     Noticed her period started on 02/21/2023        HPI:     Patient presents to the office for routine follow-up.  She is a noncompliant patient with medication regimen and follow-ups.  She has past medical history including hypertension, GERD, type 2 diabetes, osteoarthritis, obesity, hyperlipidemia.    Today, patient reports she does have concerns for bilateral ear fullness/muffled sensation.  This is been a recurrent issue.  She feels that her hearing is decreased.  She admits to some sinus/nasal congestion.  No fevers or chills.  No history of ear tubes.    Patient reports intermittent vaginal bleeding for the last week.  She states she is concerned she is starting her \"period\".    No current gynecologist.  Denies any pelvic pain.  No vaginal itching.  No flank pain.  No change in urinary function.    Of note, patient reports she has not been taking her diabetic medications for the last several months.  She just started Trulicity yesterday.  She has not been taking metformin.    We discussed health maintenance especially related to diabetes and vaccinations.  She defers vaccines at this time.  I did recommend updating blood work.    BP stable.  Weight stable.      Hemoglobin A1C (%)   Date Value   03/15/2022 8.1 (H)             ( goal A1C is < 7)   No results found for: LABMICR  LDL Cholesterol (mg/dL)   Date Value   03/15/2022 172 (H)       (goal LDL is <100)   AST (U/L)   Date Value   01/15/2022 27     ALT (U/L)   Date Value   01/15/2022 14     BUN (mg/dL)   Date Value  01/15/2022 10     BP Readings from Last 3 Encounters:   03/01/23 112/62   03/29/22 136/82   03/23/22 118/78          (goal 120/80)    Past Medical History:   Diagnosis Date    Diabetes mellitus (HCC)     GERD (gastroesophageal reflux disease)     Hyperplastic polyp of intestine     Hypertension     Metabolic syndrome     PRE DIABETIC    Obesity       Past Surgical History:   Procedure Laterality Date    BUNIONECTOMY Bilateral     COLONOSCOPY  5/28/15    BX RECTAL POLYP,HYPERPLASTIC POLYP    COLONOSCOPY N/A 3/4/2022    COLONOSCOPY DIAGNOSTIC performed by Christina Hart MD at [address] [place] [address] ARTHROSCOPY Right     ROSE STEROTACTIC LOC BREAST BIOPSY LEFT Left 2/10/2022    ROSE STEROTACTIC LOC BREAST BIOPSY LEFT 2/10/2022 [facility]       Family History   Problem Relation Age of Onset    Breast Cancer Mother     Breast Cancer Paternal Aunt     Breast Cancer Paternal Aunt     Breast Cancer Paternal Aunt     Breast Cancer Paternal Aunt     Breast Cancer Paternal Aunt     Breast Cancer Paternal Aunt        Social History     Tobacco Use    Smoking status: Never    Smokeless tobacco: Never   Substance Use Topics    Alcohol use: No      Current Outpatient Medications   Medication Sig Dispense Refill    metoprolol tartrate (LOPRESSOR) 25 MG tablet TAKE ONE TABLET BY MOUTH TWICE A DAY 60 tablet 2    metFORMIN (GLUCOPHAGE) 500 MG tablet Take 1 tablet by mouth 2 times daily (with meals) 180 tablet 1    fluticasone (FLONASE) 50 MCG/ACT nasal spray 2 sprays by Each Nostril route daily 16 g 0    cetirizine (ZYRTEC) 10 MG tablet Take 1 tablet by mouth daily 30 tablet 0    dulaglutide (TRULICITY) 1.5 VV/1.1PM SC injection Inject 0.5 mLs into the skin once a week 4 Adjustable Dose Pre-filled Pen Syringe 0    Continuous Blood Gluc Sensor (FREESTYLE GABRIELLA 14 DAY SENSOR) [facility] USE TO MONITOR BLOOD SUGAR CONTINUOUSLY 2 each 3    Continuous Blood Gluc  (FREESTYLE GABRIELLA 14 DAY READER) FREDDY USE WITH SENSOR TO MONITOR BLOOD SUGAR CONTINUOUSLY 1 each 3    pantoprazole (PROTONIX) 20 MG tablet TAKE ONE TABLET BY MOUTH EVERY MORNING BEFORE BREAKFAST 30 tablet 2    Calcium Carbonate-Vit D-Min (CALTRATE 600+D PLUS MINERALS) 600-800 MG-UNIT CHEW Take 1 tablet by mouth daily 60 tablet 6    Misc. Devices MISC 1 PAIR OF DIABETIC SHOES (1 LEFT/ 1 RIGHT)  1-3 PAIRS OF INSERTS (LEFT/ RIGHT) 2 each 0    Handicap Placard MISC by Does not apply route Dx: Osteoarthritis knees, foot deformity    Exp: 3/23/3027 1 each 0    acetaminophen (TYLENOL) 325 MG tablet Take 650 mg by mouth every 6 hours as needed for Pain      bisacodyl (DULCOLAX) 5 MG EC tablet TAKE 4 TABS AT 10 AM THE DAY PRIOR TO COLONOSCOPY 4 tablet 0    polyethylene glycol (GLYCOLAX) 17 GM/SCOOP powder Use as directed by following your patient instructions given by office. 238 g 0    ibuprofen (ADVIL;MOTRIN) 200 MG tablet Take 200 mg by mouth every 6 hours as needed for Pain       No current facility-administered medications for this visit.      No Known Allergies    Health Maintenance   Topic Date Due    Pneumococcal 0-64 years Vaccine (1 - PCV) Never done    HIV screen  Never done    Diabetic retinal exam  Never done    Hepatitis C screen  Never done    Shingles vaccine (1 of 2) Never done    COVID-19 Vaccine (3 - Booster for Pfizer series) 05/15/2021    GFR test (Diabetes, CKD 3-4, OR last GFR 15-59)  01/15/2023    Breast cancer screen  02/10/2023    A1C test (Diabetic or Prediabetic)  03/15/2023    Lipids  03/15/2023    Diabetic Alb to Cr ratio (uACR) test  03/23/2023    Diabetic foot exam  03/28/2023    Flu vaccine (1) 03/01/2024 (Originally 8/1/2022)    Depression Screen  03/01/2024    Colorectal Cancer Screen  03/04/2025    Cervical cancer screen  03/29/2027    DTaP/Tdap/Td vaccine (2 - Td or Tdap) 12/04/2028    Hepatitis A vaccine  Aged Out    Hib vaccine  Aged Out    Meningococcal (ACWY) vaccine  Aged Out       Subjective:      Review of Systems   Constitutional:  Negative for chills, fatigue and fever. HENT:  Positive for hearing loss. Negative for congestion, rhinorrhea and sinus pain. + ear fullness   Respiratory:  Negative for cough and shortness of breath. Cardiovascular:  Negative for chest pain and leg swelling. Gastrointestinal:  Negative for abdominal pain, constipation, diarrhea, nausea and vomiting. Genitourinary:  Positive for vaginal bleeding (new). Negative for difficulty urinating, frequency, urgency, vaginal discharge and vaginal pain. Musculoskeletal:  Positive for arthralgias and gait problem. Negative for back pain and myalgias. Neurological:  Negative for dizziness and headaches. Psychiatric/Behavioral:  Negative for confusion, dysphoric mood and sleep disturbance. The patient is not nervous/anxious. All other systems reviewed and are negative. Objective:     Physical Exam  Vitals and nursing note reviewed. Constitutional:       General: She is not in acute distress. Appearance: Normal appearance. She is obese. HENT:      Head: Normocephalic. Right Ear: External ear normal. No mastoid tenderness. Tympanic membrane is not perforated or erythematous. Left Ear: External ear normal. No mastoid tenderness. Tympanic membrane is not perforated or erythematous. Mouth/Throat:      Mouth: Mucous membranes are moist.   Eyes:      Extraocular Movements: Extraocular movements intact. Conjunctiva/sclera: Conjunctivae normal.      Pupils: Pupils are equal, round, and reactive to light. Cardiovascular:      Rate and Rhythm: Normal rate and regular rhythm. Pulses: Normal pulses. Heart sounds: Normal heart sounds. Pulmonary:      Effort: Pulmonary effort is normal.      Breath sounds: Normal breath sounds. Abdominal:      General: Abdomen is flat. Bowel sounds are normal.      Palpations: Abdomen is soft. Tenderness: There is no abdominal tenderness.    Genitourinary:     Comments: Defers exam. Will follow with Gynecology. Musculoskeletal:      Cervical back: Normal range of motion. Right lower leg: No edema. Left lower leg: No edema. Feet:      Comments: Walk boot present on left foot/ankle. Lymphadenopathy:      Cervical: No cervical adenopathy. Skin:     General: Skin is warm. Capillary Refill: Capillary refill takes less than 2 seconds. Neurological:      General: No focal deficit present. Mental Status: She is alert and oriented to person, place, and time. Psychiatric:         Mood and Affect: Mood normal.         Behavior: Behavior normal.     /62 (Site: Left Upper Arm, Position: Sitting, Cuff Size: Large Adult)   Pulse 76   Resp 16   Ht 5' 9.5\" (1.765 m)   Wt 292 lb 12.8 oz (132.8 kg) Comment: with walk boot on  LMP 02/28/2015 (Approximate)   SpO2 96%   BMI 42.62 kg/m²     Assessment:       ICD-10-CM    1. Type 2 diabetes mellitus without complication, without long-term current use of insulin (HCC)  E11.9 POCT glycosylated hemoglobin (Hb A1C)     metFORMIN (GLUCOPHAGE) 500 MG tablet     Microalbumin, Ur      2. Primary hypertension  I10 metoprolol tartrate (LOPRESSOR) 25 MG tablet      3. Morbid obesity with BMI of 40.0-44.9, adult [facility]  E66.01     Z68.41       4. Postmenopausal bleeding  N95.0 Daniella aMrie DO, Gynecology, [place]      5. Ear fullness, bilateral  H93.8X3 fluticasone (FLONASE) 50 MCG/ACT nasal spray     cetirizine (ZYRTEC) 10 MG tablet      6. Encounter for screening mammogram for malignant neoplasm of breast  Z12.31 ROSE DIGITAL SCREEN W OR WO CAD BILATERAL               Plan:      1. Patient is noncompliant with diabetic regimen. A1c has increased to 8.6. We discussed the importance of metformin 500 mg twice daily and Trulicity 1.5 mg weekly. I also strongly advised on dietary changes to reduce sugars and carbohydrates. Encouraged activity as tolerated. 2.  Hypertension is stable. Continue metoprolol twice daily.   3.  I discussed the importance of obesity management for chronic medical diseases. I encouraged dietary changes to promote weight loss. 4.  Patient given referral to gynecology for further evaluation and management of postmenopausal bleeding concern. We discussed the importance of close follow-up. 5.  Patient with bilateral ear fullness/eustachian tube dysfunction. Plan for Flonase and Zyrtec. If symptoms persist we can refer to ENT for further management and evaluation of hearing loss. 6.  Patient agreeable to mammogram for screening breast cancer. Return in about 4 weeks (around 3/29/2023) for medication recheck, diabetes, blood pressure. Orders Placed This Encounter   Procedures    ROSE DIGITAL SCREEN W OR WO CAD BILATERAL     Standing Status:   Future     Standing Expiration Date:   5/1/2024    Microalbumin, Ur     Standing Status:   Future     Standing Expiration Date:   3/1/2024    Baljinder Warren DO, Gynecology, [facility]     Referral Priority:   Routine     Referral Type:   Eval and Treat     Referral Reason:   Specialty Services Required     Referred to Provider:   Edwige Rivas DO     Requested Specialty:   Obstetrics & Gynecology     Number of Visits Requested:   1    POCT glycosylated hemoglobin (Hb A1C)         Patient given educational materials - see patient instructions. Discussed use, benefit, and side effects of prescribed medications. All patient questions answered. Pt voiced understanding. Reviewed health maintenance. Instructed to continue current medications, diet and exercise. Patient agreed with treatment plan. Follow up as directed.         Electronically signed by Gisela Swan PA-C on 3/1/2023 at 1:06 PM

## 2023-03-12 ENCOUNTER — HOSPITAL ENCOUNTER (EMERGENCY)
Age: 63
Discharge: HOME | End: 2023-03-12
Attending: EMERGENCY MEDICINE
Payer: MEDICAID

## 2023-03-12 ENCOUNTER — APPOINTMENT (OUTPATIENT)
Dept: CT IMAGING | Age: 63
End: 2023-03-12
Payer: MEDICAID

## 2023-03-12 VITALS
RESPIRATION RATE: 18 BRPM | HEART RATE: 74 BPM | TEMPERATURE: 98.2 F | OXYGEN SATURATION: 96 % | DIASTOLIC BLOOD PRESSURE: 72 MMHG | SYSTOLIC BLOOD PRESSURE: 131 MMHG

## 2023-03-12 DIAGNOSIS — R10.84 GENERALIZED ABDOMINAL PAIN: ICD-10-CM

## 2023-03-12 DIAGNOSIS — K62.5 RECTAL BLEEDING: Primary | ICD-10-CM

## 2023-03-12 LAB
ABSOLUTE EOS #: 0.19 K/UL (ref 0–0.44)
ABSOLUTE IMMATURE GRANULOCYTE: 0.01 K/UL (ref 0–0.3)
ABSOLUTE LYMPH #: 2.31 K/UL (ref 1.1–3.7)
ABSOLUTE MONO #: 0.74 K/UL (ref 0.1–1.2)
ALBUMIN SERPL-MCNC: 3.7 G/DL (ref 3.5–5.2)
ALP SERPL-CCNC: 72 U/L (ref 35–104)
ALT SERPL-CCNC: 12 U/L (ref 5–33)
ANION GAP SERPL CALCULATED.3IONS-SCNC: 9 MMOL/L (ref 9–17)
AST SERPL-CCNC: 15 U/L
BASOPHILS # BLD: 0 % (ref 0–2)
BASOPHILS ABSOLUTE: 0.03 K/UL (ref 0–0.2)
BILIRUB SERPL-MCNC: 1 MG/DL (ref 0.3–1.2)
BUN SERPL-MCNC: 11 MG/DL (ref 8–23)
BUN/CREAT BLD: 12 (ref 9–20)
CALCIUM SERPL-MCNC: 9.2 MG/DL (ref 8.6–10.4)
CHLORIDE SERPL-SCNC: 104 MMOL/L (ref 98–107)
CO2 SERPL-SCNC: 27 MMOL/L (ref 20–31)
CREAT SERPL-MCNC: 0.9 MG/DL (ref 0.5–0.9)
DATE, STOOL #1: ABNORMAL
EOSINOPHILS RELATIVE PERCENT: 2 % (ref 1–4)
GFR SERPL CREATININE-BSD FRML MDRD: >60 ML/MIN/1.73M2
GLUCOSE SERPL-MCNC: 96 MG/DL (ref 70–99)
HCT VFR BLD AUTO: 41.2 % (ref 36.3–47.1)
HEMOCCULT SP1 STL QL: POSITIVE
HGB BLD-MCNC: 13.1 G/DL (ref 11.9–15.1)
IMMATURE GRANULOCYTES: 0 %
INR PPP: 1
LACTATE PLASV-SCNC: 0.8 MMOL/L (ref 0.5–2.2)
LYMPHOCYTES # BLD: 29 % (ref 24–43)
MCH RBC QN AUTO: 27.9 PG (ref 25.2–33.5)
MCHC RBC AUTO-ENTMCNC: 31.8 G/DL (ref 28.4–34.8)
MCV RBC AUTO: 87.8 FL (ref 82.6–102.9)
MONOCYTES # BLD: 9 % (ref 3–12)
NRBC AUTOMATED: 0 PER 100 WBC
PDW BLD-RTO: 14 % (ref 11.8–14.4)
PLATELET # BLD AUTO: 273 K/UL (ref 138–453)
PMV BLD AUTO: 9.4 FL (ref 8.1–13.5)
POTASSIUM SERPL-SCNC: 3.7 MMOL/L (ref 3.7–5.3)
PROT SERPL-MCNC: 6.9 G/DL (ref 6.4–8.3)
PROTHROMBIN TIME: 13.2 SEC (ref 11.5–14.2)
RBC # BLD: 4.69 M/UL (ref 3.95–5.11)
SEG NEUTROPHILS: 60 % (ref 36–65)
SEGMENTED NEUTROPHILS ABSOLUTE COUNT: 4.75 K/UL (ref 1.5–8.1)
SODIUM SERPL-SCNC: 140 MMOL/L (ref 135–144)
TIME, STOOL #1: 2044
WBC # BLD AUTO: 8 K/UL (ref 3.5–11.3)

## 2023-03-12 PROCEDURE — 74177 CT ABD & PELVIS W/CONTRAST: CPT

## 2023-03-12 PROCEDURE — A4216 STERILE WATER/SALINE, 10 ML: HCPCS | Performed by: EMERGENCY MEDICINE

## 2023-03-12 PROCEDURE — 83605 ASSAY OF LACTIC ACID: CPT

## 2023-03-12 PROCEDURE — C9113 INJ PANTOPRAZOLE SODIUM, VIA: HCPCS | Performed by: EMERGENCY MEDICINE

## 2023-03-12 PROCEDURE — 96374 THER/PROPH/DIAG INJ IV PUSH: CPT

## 2023-03-12 PROCEDURE — 85025 COMPLETE CBC W/AUTO DIFF WBC: CPT

## 2023-03-12 PROCEDURE — 80053 COMPREHEN METABOLIC PANEL: CPT

## 2023-03-12 PROCEDURE — 99285 EMERGENCY DEPT VISIT HI MDM: CPT

## 2023-03-12 PROCEDURE — 82270 OCCULT BLOOD FECES: CPT

## 2023-03-12 PROCEDURE — 6360000004 HC RX CONTRAST MEDICATION: Performed by: EMERGENCY MEDICINE

## 2023-03-12 PROCEDURE — 96376 TX/PRO/DX INJ SAME DRUG ADON: CPT

## 2023-03-12 PROCEDURE — 85610 PROTHROMBIN TIME: CPT

## 2023-03-12 PROCEDURE — 2580000003 HC RX 258: Performed by: EMERGENCY MEDICINE

## 2023-03-12 PROCEDURE — 6360000002 HC RX W HCPCS: Performed by: EMERGENCY MEDICINE

## 2023-03-12 PROCEDURE — 96375 TX/PRO/DX INJ NEW DRUG ADDON: CPT

## 2023-03-12 PROCEDURE — 36415 COLL VENOUS BLD VENIPUNCTURE: CPT

## 2023-03-12 RX ORDER — DOCUSATE SODIUM 100 MG/1
100 CAPSULE, LIQUID FILLED ORAL 2 TIMES DAILY
Qty: 14 CAPSULE | Refills: 0 | Status: SHIPPED | OUTPATIENT
Start: 2023-03-12 | End: 2023-03-19

## 2023-03-12 RX ORDER — SODIUM CHLORIDE 0.9 % (FLUSH) 0.9 %
10 SYRINGE (ML) INJECTION PRN
Status: DISCONTINUED | OUTPATIENT
Start: 2023-03-12 | End: 2023-03-13 | Stop reason: HOSPADM

## 2023-03-12 RX ORDER — MORPHINE SULFATE 2 MG/ML
2 INJECTION, SOLUTION INTRAMUSCULAR; INTRAVENOUS ONCE
Status: COMPLETED | OUTPATIENT
Start: 2023-03-12 | End: 2023-03-12

## 2023-03-12 RX ORDER — 0.9 % SODIUM CHLORIDE 0.9 %
80 INTRAVENOUS SOLUTION INTRAVENOUS ONCE
Status: DISCONTINUED | OUTPATIENT
Start: 2023-03-12 | End: 2023-03-13 | Stop reason: HOSPADM

## 2023-03-12 RX ORDER — ONDANSETRON 2 MG/ML
4 INJECTION INTRAMUSCULAR; INTRAVENOUS ONCE
Status: COMPLETED | OUTPATIENT
Start: 2023-03-12 | End: 2023-03-12

## 2023-03-12 RX ORDER — HYDROCODONE BITARTRATE AND ACETAMINOPHEN 5; 325 MG/1; MG/1
1 TABLET ORAL EVERY 4 HOURS PRN
Qty: 10 TABLET | Refills: 0 | Status: SHIPPED | OUTPATIENT
Start: 2023-03-12 | End: 2023-03-15

## 2023-03-12 RX ADMIN — SODIUM CHLORIDE 40 MG: 9 INJECTION, SOLUTION INTRAMUSCULAR; INTRAVENOUS; SUBCUTANEOUS at 19:23

## 2023-03-12 RX ADMIN — IOPAMIDOL 75 ML: 755 INJECTION, SOLUTION INTRAVENOUS at 19:55

## 2023-03-12 RX ADMIN — MORPHINE SULFATE 2 MG: 2 INJECTION, SOLUTION INTRAMUSCULAR; INTRAVENOUS at 21:04

## 2023-03-12 RX ADMIN — MORPHINE SULFATE 2 MG: 2 INJECTION, SOLUTION INTRAMUSCULAR; INTRAVENOUS at 19:24

## 2023-03-12 RX ADMIN — SODIUM CHLORIDE, PRESERVATIVE FREE 10 ML: 5 INJECTION INTRAVENOUS at 19:54

## 2023-03-12 RX ADMIN — ONDANSETRON 4 MG: 2 INJECTION INTRAMUSCULAR; INTRAVENOUS at 19:23

## 2023-03-12 ASSESSMENT — PAIN - FUNCTIONAL ASSESSMENT: PAIN_FUNCTIONAL_ASSESSMENT: 0-10

## 2023-03-12 ASSESSMENT — PAIN SCALES - GENERAL
PAINLEVEL_OUTOF10: 10
PAINLEVEL_OUTOF10: 9
PAINLEVEL_OUTOF10: 8

## 2023-03-13 NOTE — DISCHARGE INSTRUCTIONS
You can take the Norco as needed for abdominal discomfort. If bleeding worsens I do recommend return to the emergency room. GI bleeding should be followed up by your primary care as well as GI. As we discussed I do recommend follow-up with your gynecologist given the vaginal bleeding. If you have concerns or again if bleeding worsens I recommend return to the emergency room.

## 2023-03-14 ENCOUNTER — HOSPITAL ENCOUNTER (OUTPATIENT)
Age: 63
Discharge: HOME | End: 2023-03-14
Payer: MEDICAID

## 2023-03-14 DIAGNOSIS — E78.00 PURE HYPERCHOLESTEROLEMIA: ICD-10-CM

## 2023-03-14 DIAGNOSIS — E11.9 TYPE 2 DIABETES MELLITUS WITHOUT COMPLICATION, WITHOUT LONG-TERM CURRENT USE OF INSULIN (HCC): ICD-10-CM

## 2023-03-14 LAB
ALBUMIN SERPL-MCNC: 3.8 G/DL (ref 3.5–5.2)
ALBUMIN/GLOBULIN RATIO: 1.1 (ref 1–2.5)
ALP SERPL-CCNC: 69 U/L (ref 35–104)
ALT SERPL-CCNC: 12 U/L (ref 5–33)
ANION GAP SERPL CALCULATED.3IONS-SCNC: 9 MMOL/L (ref 9–17)
AST SERPL-CCNC: 14 U/L
BILIRUB SERPL-MCNC: 1.1 MG/DL (ref 0.3–1.2)
BUN SERPL-MCNC: 11 MG/DL (ref 8–23)
CALCIUM SERPL-MCNC: 9.4 MG/DL (ref 8.6–10.4)
CHLORIDE SERPL-SCNC: 105 MMOL/L (ref 98–107)
CHOLEST SERPL-MCNC: 237 MG/DL
CHOLESTEROL/HDL RATIO: 5.3
CO2 SERPL-SCNC: 27 MMOL/L (ref 20–31)
CREAT SERPL-MCNC: 0.99 MG/DL (ref 0.5–0.9)
CREATININE URINE: 364.9 MG/DL (ref 28–217)
EST. AVERAGE GLUCOSE BLD GHB EST-MCNC: 160 MG/DL
GFR SERPL CREATININE-BSD FRML MDRD: >60 ML/MIN/1.73M2
GLUCOSE SERPL-MCNC: 104 MG/DL (ref 70–99)
HBA1C MFR BLD: 7.2 % (ref 4–6)
HCT VFR BLD AUTO: 41.2 % (ref 36.3–47.1)
HDLC SERPL-MCNC: 45 MG/DL
HGB BLD-MCNC: 13.3 G/DL (ref 11.9–15.1)
LDLC SERPL CALC-MCNC: 164 MG/DL (ref 0–130)
MCH RBC QN AUTO: 28.5 PG (ref 25.2–33.5)
MCHC RBC AUTO-ENTMCNC: 32.3 G/DL (ref 28.4–34.8)
MCV RBC AUTO: 88.2 FL (ref 82.6–102.9)
MICROALBUMIN/CREAT 24H UR: 15 MG/L
MICROALBUMIN/CREAT UR-RTO: 4 MCG/MG CREAT
NRBC AUTOMATED: 0 PER 100 WBC
PDW BLD-RTO: 14.4 % (ref 11.8–14.4)
PLATELET # BLD AUTO: 287 K/UL (ref 138–453)
PMV BLD AUTO: 9.3 FL (ref 8.1–13.5)
POTASSIUM SERPL-SCNC: 4 MMOL/L (ref 3.7–5.3)
PROT SERPL-MCNC: 7.2 G/DL (ref 6.4–8.3)
RBC # BLD: 4.67 M/UL (ref 3.95–5.11)
SODIUM SERPL-SCNC: 141 MMOL/L (ref 135–144)
TRIGL SERPL-MCNC: 140 MG/DL
WBC # BLD AUTO: 6 K/UL (ref 3.5–11.3)

## 2023-03-14 PROCEDURE — 82570 ASSAY OF URINE CREATININE: CPT

## 2023-03-14 PROCEDURE — 80053 COMPREHEN METABOLIC PANEL: CPT

## 2023-03-14 PROCEDURE — 83036 HEMOGLOBIN GLYCOSYLATED A1C: CPT

## 2023-03-14 PROCEDURE — 85027 COMPLETE CBC AUTOMATED: CPT

## 2023-03-14 PROCEDURE — 80061 LIPID PANEL: CPT

## 2023-03-14 PROCEDURE — 82043 UR ALBUMIN QUANTITATIVE: CPT

## 2023-03-14 PROCEDURE — 36415 COLL VENOUS BLD VENIPUNCTURE: CPT

## 2023-03-16 ASSESSMENT — ENCOUNTER SYMPTOMS
ABDOMINAL PAIN: 1
BLOOD IN STOOL: 1
VOMITING: 0
NAUSEA: 0

## 2023-03-16 NOTE — ED PROVIDER NOTES
EMERGENCY DEPARTMENT ENCOUNTER    Pt Name: Manuel Juarez  MRN: [id number]  [place] 1960  Date of evaluation: 3/16/23  CHIEF COMPLAINT       Chief Complaint   Patient presents with    Rectal Bleeding    Abdominal Pain     Pt here with blood in stool and abdominal pain since last night     HISTORY OF PRESENT ILLNESS   53-year-old female presents emergency room for abdominal discomfort that started yesterday. She had noticed some blood in the stool today. She has never had bleeding before. She does report history of colonoscopy in the past with some polyps. She is not on a blood thinner. No nausea or vomiting. No fevers. Bleeding is intermittent with bowel movements. REVIEW OF SYSTEMS     Review of Systems   Constitutional:  Negative for activity change and fever. Gastrointestinal:  Positive for abdominal pain and blood in stool. Negative for nausea and vomiting. Genitourinary:  Negative for dysuria.    PASTMEDICAL HISTORY     Past Medical History:   Diagnosis Date    Diabetes mellitus (HCC)     GERD (gastroesophageal reflux disease)     Hyperplastic polyp of intestine     Hypertension     Metabolic syndrome     PRE DIABETIC    Obesity      Past Problem List  Patient Active Problem List   Diagnosis Code    Hyperplastic polyp of intestine K63.5    Morbid obesity with BMI of 40.0-44.9, adult ([facility]) E66.01, Z68.41    Chronic pain of both knees M25.561, M25.562, G89.29    Primary hypertension I10    Type 2 diabetes mellitus without complication, without long-term current use of insulin ([facility]) E11.9    Pure hypercholesterolemia E78.00    Gastroesophageal reflux disease K21.9    Primary osteoarthritis of both knees M17.0     SURGICAL HISTORY       Past Surgical History:   Procedure Laterality Date    BUNIONECTOMY Bilateral     COLONOSCOPY  5/28/15    BX RECTAL POLYP,HYPERPLASTIC POLYP    COLONOSCOPY N/A 3/4/2022    COLONOSCOPY DIAGNOSTIC performed by Raciel Wolf MD at [facility] 42 Right ROSE STEROTACTIC LOC BREAST BIOPSY LEFT Left 2/10/2022    ROSE STEROTACTIC LOC BREAST BIOPSY LEFT 2/10/2022 [facility]     CURRENT MEDICATIONS       Discharge Medication List as of 3/12/2023  8:58 PM        CONTINUE these medications which have NOT CHANGED    Details   metoprolol tartrate (LOPRESSOR) 25 MG tablet TAKE ONE TABLET BY MOUTH TWICE A DAY, Disp-60 tablet, R-2Normal      metFORMIN (GLUCOPHAGE) 500 MG tablet Take 1 tablet by mouth 2 times daily (with meals), Disp-180 tablet, R-1Normal      fluticasone (FLONASE) 50 MCG/ACT nasal spray 2 sprays by Each Nostril route daily, Disp-16 g, R-0Normal      cetirizine (ZYRTEC) 10 MG tablet Take 1 tablet by mouth daily, Disp-30 tablet, R-0Normal      dulaglutide (TRULICITY) 1.5 JJ/0.2DA SC injection Inject 0.5 mLs into the skin once a week, Disp-4 Adjustable Dose Pre-filled Pen Syringe, R-0Normal      Continuous Blood Gluc Sensor (FREESTYLE GABRIELLA 14 DAY SENSOR) MISC USE TO MONITOR BLOOD SUGAR CONTINUOUSLY, Disp-2 each, R-3Normal      Continuous Blood Gluc  (FREESTYLE GABRIELLA 14 DAY READER) FREDDY USE WITH SENSOR TO MONITOR BLOOD SUGAR CONTINUOUSLY, Disp-1 each, R-3Normal      pantoprazole (PROTONIX) 20 MG tablet TAKE ONE TABLET BY MOUTH EVERY MORNING BEFORE BREAKFAST, Disp-30 tablet, R-2Normal      Calcium Carbonate-Vit D-Min (CALTRATE 600+D PLUS MINERALS) 600-800 MG-UNIT CHEW Take 1 tablet by mouth daily, Disp-60 tablet, R-6Normal      Misc.  Devices MISC Disp-2 each, R-0, Print1 PAIR OF DIABETIC SHOES (1 LEFT/ 1 RIGHT) 1-3 PAIRS OF INSERTS (LEFT/ RIGHT)      Handicap Placard MISC Starting Wed 3/23/2022, Disp-1 each, R-0, PrintDx: Osteoarthritis knees, foot deformity  Exp: 3/23/3027      acetaminophen (TYLENOL) 325 MG tablet Take 650 mg by mouth every 6 hours as needed for PainHistorical Med      bisacodyl (DULCOLAX) 5 MG EC tablet TAKE 4 TABS AT 10 AM THE DAY PRIOR TO COLONOSCOPY, Disp-4 tablet, R-0Normal      polyethylene glycol (GLYCOLAX) 17 GM/SCOOP powder Use as directed by following your patient instructions given by office. , Disp-238 g, R-0Normal      ibuprofen (ADVIL;MOTRIN) 200 MG tablet Take 200 mg by mouth every 6 hours as needed for Pain           ALLERGIES     has No Known Allergies. FAMILY HISTORY     She indicated that the status of her mother is unknown. SOCIAL HISTORY       Social History     Tobacco Use    Smoking status: Never    Smokeless tobacco: Never   Vaping Use    Vaping Use: Never used   Substance Use Topics    Alcohol use: No    Drug use: No     PHYSICAL EXAM     INITIAL VITALS: /72   Pulse 74   Temp 98.2 °F (36.8 °C) (Oral)   Resp 18   LMP 02/28/2015 (Approximate)   SpO2 96%    Physical Exam  Constitutional:       General: She is not in acute distress. Appearance: She is well-developed. HENT:      Head: Normocephalic. Eyes:      Pupils: Pupils are equal, round, and reactive to light. Cardiovascular:      Rate and Rhythm: Normal rate and regular rhythm. Heart sounds: Normal heart sounds. Pulmonary:      Effort: Pulmonary effort is normal. No respiratory distress. Breath sounds: Normal breath sounds. Abdominal:      General: Bowel sounds are normal.      Palpations: Abdomen is soft. Tenderness: There is generalized abdominal tenderness. Genitourinary:     Comments: No gross blood on rectal exam  Musculoskeletal:         General: Normal range of motion. Skin:     General: Skin is warm and dry. Neurological:      Mental Status: She is alert and oriented to person, place, and time.        MEDICAL DECISION MAKING / ED COURSE:   Summary of Patient Presentation:        1)  Number and Complexity of Problems  Problem List This Visit: Abdominal discomfort, blood in stool    Differential Diagnosis: Intra-abdominal infection, hemorrhoids, colitis, mesenteric ischemia, diverticulitis      Pertinent Comorbid Conditions: Type 2 diabetes hypertension      3)  Treatment and Disposition    Patient repeat assessment: No distress stable appearing 69-year-old female presented to the emergency room for abdominal discomfort with intermittent rectal bleeding. Patient does not have any visible bleeding on exam she does have a positive stool occult. She does have history of gastric reflux disease and hyperplastic polyp of the intestine. CT abdomen pelvis is negative for acute intra-abdominal pathology. Patient does not have elevated white count or elevated lactic acid. We discussed PCP follow-up. Patient encouraged on the importance of follow-up given her symptoms. Patient was given return precautions. \"ED Course\" Notes From Epic Narrator:         CRITICAL CARE:       PROCEDURES:    Procedures      DATA FOR LAB AND RADIOLOGY TESTS ORDERED BELOW ARE REVIEWED BY THE ED CLINICIAN:    RADIOLOGY: All x-rays, CT, MRI, and formal ultrasound images (except ED bedside ultrasound) are read by the radiologist, see reports below, unless otherwise noted in MDM or here. Reports below are reviewed by myself. CT ABDOMEN PELVIS W IV CONTRAST Additional Contrast? None   Final Result   1. No acute abnormality identified. 2.  Diverticulosis. 3.  Gallbladder sludge or small layering stones. LABS: Lab orders shown below, the results are reviewed by myself, and all abnormals are listed below.   Labs Reviewed   OCCULT BLOOD SCREEN - Abnormal; Notable for the following components:       Result Value    Occult Blood, Stool #1 POSITIVE (*)     All other components within normal limits   CBC WITH AUTO DIFFERENTIAL   COMPREHENSIVE METABOLIC PANEL   LACTIC ACID   PROTIME-INR       Vitals Reviewed:    Vitals:    03/12/23 1811   BP: 131/72   Pulse: 74   Resp: 18   Temp: 98.2 °F (36.8 °C)   TempSrc: Oral   SpO2: 96%     MEDICATIONS GIVEN TO PATIENT THIS ENCOUNTER:  Orders Placed This Encounter   Medications    ondansetron (ZOFRAN) injection 4 mg    morphine (PF) injection 2 mg    DISCONTD: pantoprazole (PROTONIX) 40 mg in sodium chloride (PF) 0.9 % 10 mL injection    iopamidol (ISOVUE-370) 76 % injection 75 mL    DISCONTD: sodium chloride flush 0.9 % injection 10 mL    DISCONTD: 0.9 % sodium chloride bolus    HYDROcodone-acetaminophen (NORCO) 5-325 MG per tablet     Sig: Take 1 tablet by mouth every 4 hours as needed for Pain for up to 3 days. Intended supply: 3 days. Take lowest dose possible to manage pain Max Daily Amount: 6 tablets     Dispense:  10 tablet     Refill:  0    docusate sodium (COLACE) 100 MG capsule     Sig: Take 1 capsule by mouth 2 times daily for 7 days     Dispense:  14 capsule     Refill:  0    morphine (PF) injection 2 mg     DISCHARGE PRESCRIPTIONS:  Discharge Medication List as of 3/12/2023  8:58 PM        START taking these medications    Details   HYDROcodone-acetaminophen (NORCO) 5-325 MG per tablet Take 1 tablet by mouth every 4 hours as needed for Pain for up to 3 days. Intended supply: 3 days. Take lowest dose possible to manage pain Max Daily Amount: 6 tablets, Disp-10 tablet, R-0Normal      docusate sodium (COLACE) 100 MG capsule Take 1 capsule by mouth 2 times daily for 7 days, Disp-14 capsule, R-0Normal           PHYSICIAN CONSULTS ORDERED THIS ENCOUNTER:  None  FINAL IMPRESSION      1. Rectal bleeding    2. Generalized abdominal pain          DISPOSITION/PLAN   DISPOSITION Decision To Discharge 03/12/2023 08:55:46 PM      OUTPATIENT FOLLOW UP THE PATIENT:  Sam Greer, [address] [place] [address]  Dr. [address]  [facility] [id number] [phone]    Schedule an appointment as soon as possible for a visit in 1 week      Guy Stock MD  [address] [place] [address] [address] [facility]  [phone]    Schedule an appointment as soon as possible for a visit in 1 week      MD Jay De León MD  03/16/23 [id number]

## 2023-03-16 NOTE — PROGRESS NOTES
presentingcondition.    I did review all the labs results available for the labs which were ordered by the primary care physician, and the other consultants, we search on epic at [facility] and all the available care everywhere [facility]    I did review all the imaging studies of the abdomen available on EMR, ordered by the primary care physician and the other consultant    I did review all the pathology from the biopsies done on the previous endoscopies    Patient's PMH/PSH,SH,PSYCH Hx, MEDs, ALLERGIES, and ROS were all reviewed and updated in the appropriate sections.    PAST MEDICAL HISTORY:  Past Medical History:   Diagnosis Date    Diabetes mellitus (HCC)     GERD (gastroesophageal reflux disease)     Hyperplastic polyp of intestine     Hypertension     Metabolic syndrome     PRE DIABETIC    Obesity        Past Surgical History:   Procedure Laterality Date    BUNIONECTOMY Bilateral     COLONOSCOPY  5/28/15    BX RECTAL POLYP,HYPERPLASTIC POLYP    COLONOSCOPY N/A 3/4/2022    COLONOSCOPY DIAGNOSTIC performed by Pravin James MD at [facility] OR    KNEE ARTHROSCOPY Right     ROSE STEROTACTIC LOC BREAST BIOPSY LEFT Left 2/10/2022    ROSE STEROTACTIC LOC BREAST BIOPSY LEFT 2/10/2022 [facility] MAMMOGRAPHY       CURRENT MEDICATIONS:    Current Outpatient Medications:     pantoprazole (PROTONIX) 40 MG tablet, Take 1 tablet by mouth every morning (before breakfast), Disp: 30 tablet, Rfl: 5    metoprolol tartrate (LOPRESSOR) 25 MG tablet, TAKE ONE TABLET BY MOUTH TWICE A DAY, Disp: 60 tablet, Rfl: 2    metFORMIN (GLUCOPHAGE) 500 MG tablet, Take 1 tablet by mouth 2 times daily (with meals), Disp: 180 tablet, Rfl: 1    fluticasone (FLONASE) 50 MCG/ACT nasal spray, 2 sprays by Each Nostril route daily, Disp: 16 g, Rfl: 0    cetirizine (ZYRTEC) 10 MG tablet, Take 1 tablet by mouth daily, Disp: 30 tablet, Rfl: 0    dulaglutide (TRULICITY) 1.5 MG/0.5ML SC injection, Inject 0.5 mLs into the skin once a week, Disp: 4 Adjustable Dose Pre-filled Pen

## 2023-03-27 ENCOUNTER — OFFICE VISIT (OUTPATIENT)
Dept: GASTROENTEROLOGY | Age: 63
End: 2023-03-27
Payer: MEDICAID

## 2023-03-27 VITALS
BODY MASS INDEX: 41.77 KG/M2 | HEART RATE: 71 BPM | WEIGHT: 287 LBS | DIASTOLIC BLOOD PRESSURE: 76 MMHG | SYSTOLIC BLOOD PRESSURE: 125 MMHG

## 2023-03-27 DIAGNOSIS — Z80.0 FAMILY HISTORY OF COLON CANCER: ICD-10-CM

## 2023-03-27 DIAGNOSIS — K21.9 GASTROESOPHAGEAL REFLUX DISEASE, UNSPECIFIED WHETHER ESOPHAGITIS PRESENT: ICD-10-CM

## 2023-03-27 DIAGNOSIS — K62.5 HEMORRHAGE OF RECTUM AND ANUS: Primary | ICD-10-CM

## 2023-03-27 DIAGNOSIS — Z87.19 HISTORY OF RECTAL POLYPS: ICD-10-CM

## 2023-03-27 PROCEDURE — 99204 OFFICE O/P NEW MOD 45 MIN: CPT | Performed by: INTERNAL MEDICINE

## 2023-03-27 PROCEDURE — 3074F SYST BP LT 130 MM HG: CPT | Performed by: INTERNAL MEDICINE

## 2023-03-27 PROCEDURE — 3078F DIAST BP <80 MM HG: CPT | Performed by: INTERNAL MEDICINE

## 2023-03-27 RX ORDER — PANTOPRAZOLE SODIUM 40 MG/1
40 TABLET, DELAYED RELEASE ORAL
Qty: 30 TABLET | Refills: 5 | Status: SHIPPED | OUTPATIENT
Start: 2023-03-27

## 2023-03-27 ASSESSMENT — ENCOUNTER SYMPTOMS
RECTAL PAIN: 0
COUGH: 0
ABDOMINAL DISTENTION: 0
VOMITING: 0
DIARRHEA: 0
BLOOD IN STOOL: 1
CHOKING: 0
NAUSEA: 0
SHORTNESS OF BREATH: 0
WHEEZING: 0
CONSTIPATION: 0
ABDOMINAL PAIN: 1
TROUBLE SWALLOWING: 0
ANAL BLEEDING: 0

## 2023-03-29 ENCOUNTER — OFFICE VISIT (OUTPATIENT)
Dept: PRIMARY CARE CLINIC | Age: 63
End: 2023-03-29
Payer: MEDICAID

## 2023-03-29 VITALS
HEIGHT: 69 IN | DIASTOLIC BLOOD PRESSURE: 70 MMHG | BODY MASS INDEX: 42.15 KG/M2 | HEART RATE: 69 BPM | RESPIRATION RATE: 16 BRPM | OXYGEN SATURATION: 97 % | SYSTOLIC BLOOD PRESSURE: 120 MMHG | WEIGHT: 284.6 LBS

## 2023-03-29 DIAGNOSIS — I10 PRIMARY HYPERTENSION: ICD-10-CM

## 2023-03-29 DIAGNOSIS — K21.9 GASTROESOPHAGEAL REFLUX DISEASE, UNSPECIFIED WHETHER ESOPHAGITIS PRESENT: ICD-10-CM

## 2023-03-29 DIAGNOSIS — N95.0 POSTMENOPAUSAL BLEEDING: ICD-10-CM

## 2023-03-29 DIAGNOSIS — K62.5 RECTAL BLEEDING: ICD-10-CM

## 2023-03-29 DIAGNOSIS — E11.9 TYPE 2 DIABETES MELLITUS WITHOUT COMPLICATION, WITHOUT LONG-TERM CURRENT USE OF INSULIN (HCC): Primary | ICD-10-CM

## 2023-03-29 PROBLEM — M21.42 PES PLANUS OF LEFT FOOT: Status: ACTIVE | Noted: 2023-02-10

## 2023-03-29 PROCEDURE — 3051F HG A1C>EQUAL 7.0%<8.0%: CPT | Performed by: PHYSICIAN ASSISTANT

## 2023-03-29 PROCEDURE — 99214 OFFICE O/P EST MOD 30 MIN: CPT | Performed by: PHYSICIAN ASSISTANT

## 2023-03-29 PROCEDURE — 3078F DIAST BP <80 MM HG: CPT | Performed by: PHYSICIAN ASSISTANT

## 2023-03-29 PROCEDURE — 3074F SYST BP LT 130 MM HG: CPT | Performed by: PHYSICIAN ASSISTANT

## 2023-03-29 RX ORDER — BLOOD-GLUCOSE SENSOR
EACH MISCELLANEOUS
Qty: 2 EACH | Refills: 2 | Status: SHIPPED | OUTPATIENT
Start: 2023-03-29

## 2023-03-29 RX ORDER — DULAGLUTIDE 3 MG/.5ML
3 INJECTION, SOLUTION SUBCUTANEOUS WEEKLY
Qty: 4 ADJUSTABLE DOSE PRE-FILLED PEN SYRINGE | Refills: 2 | Status: SHIPPED | OUTPATIENT
Start: 2023-03-29

## 2023-03-29 ASSESSMENT — ENCOUNTER SYMPTOMS
BACK PAIN: 0
BLOOD IN STOOL: 1
NAUSEA: 0
SHORTNESS OF BREATH: 0
COUGH: 0
SINUS PAIN: 0
VOMITING: 0
DIARRHEA: 0
ABDOMINAL PAIN: 1
CONSTIPATION: 0
RHINORRHEA: 0

## 2023-03-29 ASSESSMENT — PATIENT HEALTH QUESTIONNAIRE - PHQ9
SUM OF ALL RESPONSES TO PHQ QUESTIONS 1-9: 0
SUM OF ALL RESPONSES TO PHQ QUESTIONS 1-9: 0
1. LITTLE INTEREST OR PLEASURE IN DOING THINGS: 0
SUM OF ALL RESPONSES TO PHQ QUESTIONS 1-9: 0
SUM OF ALL RESPONSES TO PHQ QUESTIONS 1-9: 0
SUM OF ALL RESPONSES TO PHQ9 QUESTIONS 1 & 2: 0
2. FEELING DOWN, DEPRESSED OR HOPELESS: 0

## 2023-03-29 NOTE — PROGRESS NOTES
not apply route Dx: Osteoarthritis knees, foot deformity    Exp: 3/23/3027 1 each 0    acetaminophen (TYLENOL) 325 MG tablet Take 650 mg by mouth every 6 hours as needed for Pain      bisacodyl (DULCOLAX) 5 MG EC tablet TAKE 4 TABS AT 10 AM THE DAY PRIOR TO COLONOSCOPY 4 tablet 0    polyethylene glycol (GLYCOLAX) 17 GM/SCOOP powder Use as directed by following your patient instructions given by office. 238 g 0    ibuprofen (ADVIL;MOTRIN) 200 MG tablet Take 200 mg by mouth every 6 hours as needed for Pain       No current facility-administered medications for this visit. No Known Allergies    Health Maintenance   Topic Date Due    Pneumococcal 0-64 years Vaccine (1 - PCV) Never done    HIV screen  Never done    Diabetic retinal exam  Never done    Hepatitis C screen  Never done    Shingles vaccine (1 of 2) Never done    COVID-19 Vaccine (3 - Booster for Pfizer series) 05/15/2021    Breast cancer screen  02/10/2023    Diabetic foot exam  03/28/2023    Flu vaccine (1) 03/01/2024 (Originally 8/1/2022)    A1C test (Diabetic or Prediabetic)  03/14/2024    Diabetic Alb to Cr ratio (uACR) test  03/14/2024    Lipids  03/14/2024    GFR test (Diabetes, CKD 3-4, OR last GFR 15-59)  03/14/2024    Depression Screen  03/29/2024    Colorectal Cancer Screen  03/04/2027    Cervical cancer screen  03/29/2027    DTaP/Tdap/Td vaccine (2 - Td or Tdap) 12/04/2028    Hepatitis A vaccine  Aged Out    Hib vaccine  Aged Out    Meningococcal (ACWY) vaccine  Aged Out       Subjective:      Review of Systems   Constitutional:  Negative for chills, fatigue and fever. HENT:  Negative for congestion, rhinorrhea and sinus pain. Respiratory:  Negative for cough and shortness of breath. Cardiovascular:  Negative for chest pain and leg swelling. Gastrointestinal:  Positive for abdominal pain and blood in stool. Negative for constipation, diarrhea, nausea and vomiting. Genitourinary:  Positive for vaginal bleeding (resolved).  Negative

## 2023-03-31 ENCOUNTER — HOSPITAL ENCOUNTER (OUTPATIENT)
Age: 63
Setting detail: OUTPATIENT SURGERY
Discharge: HOME | End: 2023-03-31
Attending: INTERNAL MEDICINE | Admitting: INTERNAL MEDICINE
Payer: MEDICAID

## 2023-03-31 ENCOUNTER — ANESTHESIA EVENT (OUTPATIENT)
Dept: OPERATING ROOM | Age: 63
End: 2023-03-31
Payer: MEDICAID

## 2023-03-31 ENCOUNTER — ANESTHESIA (OUTPATIENT)
Dept: OPERATING ROOM | Age: 63
End: 2023-03-31
Payer: MEDICAID

## 2023-03-31 VITALS
SYSTOLIC BLOOD PRESSURE: 124 MMHG | HEART RATE: 66 BPM | OXYGEN SATURATION: 99 % | DIASTOLIC BLOOD PRESSURE: 75 MMHG | BODY MASS INDEX: 42.06 KG/M2 | TEMPERATURE: 97 F | RESPIRATION RATE: 13 BRPM | WEIGHT: 284 LBS | HEIGHT: 69 IN

## 2023-03-31 DIAGNOSIS — Z87.19 HISTORY OF RECTAL POLYPS: ICD-10-CM

## 2023-03-31 DIAGNOSIS — K62.5 HEMORRHAGE OF RECTUM AND ANUS: ICD-10-CM

## 2023-03-31 DIAGNOSIS — K21.9 GASTROESOPHAGEAL REFLUX DISEASE, UNSPECIFIED WHETHER ESOPHAGITIS PRESENT: ICD-10-CM

## 2023-03-31 DIAGNOSIS — Z80.0 FAMILY HX OF COLON CANCER: ICD-10-CM

## 2023-03-31 LAB
GLUCOSE BLD-MCNC: 105 MG/DL (ref 65–105)
GLUCOSE BLD-MCNC: 128 MG/DL (ref 65–105)

## 2023-03-31 PROCEDURE — 6360000002 HC RX W HCPCS: Performed by: NURSE ANESTHETIST, CERTIFIED REGISTERED

## 2023-03-31 PROCEDURE — 45330 DIAGNOSTIC SIGMOIDOSCOPY: CPT | Performed by: INTERNAL MEDICINE

## 2023-03-31 PROCEDURE — 3700000000 HC ANESTHESIA ATTENDED CARE: Performed by: INTERNAL MEDICINE

## 2023-03-31 PROCEDURE — 88342 IMHCHEM/IMCYTCHM 1ST ANTB: CPT

## 2023-03-31 PROCEDURE — 3700000001 HC ADD 15 MINUTES (ANESTHESIA): Performed by: INTERNAL MEDICINE

## 2023-03-31 PROCEDURE — 2580000003 HC RX 258: Performed by: ANESTHESIOLOGY

## 2023-03-31 PROCEDURE — 88305 TISSUE EXAM BY PATHOLOGIST: CPT

## 2023-03-31 PROCEDURE — 43239 EGD BIOPSY SINGLE/MULTIPLE: CPT | Performed by: INTERNAL MEDICINE

## 2023-03-31 PROCEDURE — 3609012400 HC EGD TRANSORAL BIOPSY SINGLE/MULTIPLE: Performed by: INTERNAL MEDICINE

## 2023-03-31 PROCEDURE — 2720000010 HC SURG SUPPLY STERILE: Performed by: INTERNAL MEDICINE

## 2023-03-31 PROCEDURE — 2709999900 HC NON-CHARGEABLE SUPPLY: Performed by: INTERNAL MEDICINE

## 2023-03-31 PROCEDURE — 7100000011 HC PHASE II RECOVERY - ADDTL 15 MIN: Performed by: INTERNAL MEDICINE

## 2023-03-31 PROCEDURE — 3609156700 HC PROCTOSIGMOIDOSCOPY DX: Performed by: INTERNAL MEDICINE

## 2023-03-31 PROCEDURE — 2500000003 HC RX 250 WO HCPCS: Performed by: NURSE ANESTHETIST, CERTIFIED REGISTERED

## 2023-03-31 PROCEDURE — 7100000010 HC PHASE II RECOVERY - FIRST 15 MIN: Performed by: INTERNAL MEDICINE

## 2023-03-31 PROCEDURE — 82947 ASSAY GLUCOSE BLOOD QUANT: CPT

## 2023-03-31 RX ORDER — LIDOCAINE HYDROCHLORIDE 20 MG/ML
INJECTION, SOLUTION EPIDURAL; INFILTRATION; INTRACAUDAL; PERINEURAL PRN
Status: DISCONTINUED | OUTPATIENT
Start: 2023-03-31 | End: 2023-03-31 | Stop reason: SDUPTHER

## 2023-03-31 RX ORDER — SODIUM CHLORIDE 9 MG/ML
INJECTION, SOLUTION INTRAVENOUS CONTINUOUS
Status: DISCONTINUED | OUTPATIENT
Start: 2023-03-31 | End: 2023-03-31 | Stop reason: HOSPADM

## 2023-03-31 RX ORDER — LIDOCAINE HYDROCHLORIDE 10 MG/ML
1 INJECTION, SOLUTION EPIDURAL; INFILTRATION; INTRACAUDAL; PERINEURAL
Status: DISCONTINUED | OUTPATIENT
Start: 2023-04-01 | End: 2023-03-31 | Stop reason: HOSPADM

## 2023-03-31 RX ORDER — SODIUM CHLORIDE, SODIUM LACTATE, POTASSIUM CHLORIDE, CALCIUM CHLORIDE 600; 310; 30; 20 MG/100ML; MG/100ML; MG/100ML; MG/100ML
INJECTION, SOLUTION INTRAVENOUS CONTINUOUS
Status: DISCONTINUED | OUTPATIENT
Start: 2023-03-31 | End: 2023-03-31 | Stop reason: HOSPADM

## 2023-03-31 RX ORDER — PROPOFOL 10 MG/ML
INJECTION, EMULSION INTRAVENOUS PRN
Status: DISCONTINUED | OUTPATIENT
Start: 2023-03-31 | End: 2023-03-31 | Stop reason: SDUPTHER

## 2023-03-31 RX ADMIN — PROPOFOL 30 MG: 10 INJECTION, EMULSION INTRAVENOUS at 11:45

## 2023-03-31 RX ADMIN — LIDOCAINE HYDROCHLORIDE 100 MG: 20 INJECTION, SOLUTION EPIDURAL; INFILTRATION; INTRACAUDAL; PERINEURAL at 10:59

## 2023-03-31 RX ADMIN — PROPOFOL 30 MG: 10 INJECTION, EMULSION INTRAVENOUS at 11:03

## 2023-03-31 RX ADMIN — PROPOFOL 30 MG: 10 INJECTION, EMULSION INTRAVENOUS at 11:20

## 2023-03-31 RX ADMIN — PROPOFOL 50 MG: 10 INJECTION, EMULSION INTRAVENOUS at 11:30

## 2023-03-31 RX ADMIN — PROPOFOL 50 MG: 10 INJECTION, EMULSION INTRAVENOUS at 11:01

## 2023-03-31 RX ADMIN — PROPOFOL 30 MG: 10 INJECTION, EMULSION INTRAVENOUS at 11:39

## 2023-03-31 RX ADMIN — PROPOFOL 30 MG: 10 INJECTION, EMULSION INTRAVENOUS at 11:42

## 2023-03-31 RX ADMIN — PROPOFOL 30 MG: 10 INJECTION, EMULSION INTRAVENOUS at 11:05

## 2023-03-31 RX ADMIN — PROPOFOL 50 MG: 10 INJECTION, EMULSION INTRAVENOUS at 10:59

## 2023-03-31 RX ADMIN — PROPOFOL 30 MG: 10 INJECTION, EMULSION INTRAVENOUS at 11:33

## 2023-03-31 RX ADMIN — SODIUM CHLORIDE, POTASSIUM CHLORIDE, SODIUM LACTATE AND CALCIUM CHLORIDE: 600; 310; 30; 20 INJECTION, SOLUTION INTRAVENOUS at 10:56

## 2023-03-31 RX ADMIN — PROPOFOL 30 MG: 10 INJECTION, EMULSION INTRAVENOUS at 11:48

## 2023-03-31 RX ADMIN — PROPOFOL 20 MG: 10 INJECTION, EMULSION INTRAVENOUS at 11:11

## 2023-03-31 RX ADMIN — PROPOFOL 30 MG: 10 INJECTION, EMULSION INTRAVENOUS at 11:24

## 2023-03-31 RX ADMIN — PROPOFOL 30 MG: 10 INJECTION, EMULSION INTRAVENOUS at 11:14

## 2023-03-31 RX ADMIN — PROPOFOL 20 MG: 10 INJECTION, EMULSION INTRAVENOUS at 11:08

## 2023-03-31 RX ADMIN — PROPOFOL 30 MG: 10 INJECTION, EMULSION INTRAVENOUS at 11:27

## 2023-03-31 RX ADMIN — PROPOFOL 30 MG: 10 INJECTION, EMULSION INTRAVENOUS at 11:36

## 2023-03-31 RX ADMIN — PROPOFOL 30 MG: 10 INJECTION, EMULSION INTRAVENOUS at 11:18

## 2023-03-31 ASSESSMENT — PAIN SCALES - GENERAL: PAINLEVEL_OUTOF10: 0

## 2023-03-31 ASSESSMENT — PAIN - FUNCTIONAL ASSESSMENT: PAIN_FUNCTIONAL_ASSESSMENT: 0-10

## 2023-03-31 NOTE — DISCHARGE INSTRUCTIONS
Upper GI Endoscopy: What to Expect at [address] may have a sore throat for a day or two after the test.  How can you care for yourself at home? Activity  Rest as much as you need to after you go home. You should be able to go back to your usual activities the day after the test.  Diet  Drink plenty of fluids (unless your doctor has told you not to). Follow-up care is a key part of your treatment and safety. Be sure to make and go to all appointments, and call your doctor if you are having problems. When should you call for help? Call 911 anytime you think you may need emergency care. For example, call if:  You passed out (lost consciousness). You cough up blood. You vomit blood or what looks like coffee grounds. You pass maroon or very bloody stools. Call your doctor now or seek immediate medical care if:  You have trouble swallowing. You have belly pain. Your stools are black and tarlike or have streaks of blood. You are sick to your stomach or cannot keep fluids down. Watch closely for changes in your health, and be sure to contact your doctor if:  Your throat still hurts after a day or two. You do not get better as expected. Where can you learn more? Go to [email or web address] and sign in to your [facility] account. Enter N911 in the [facility] box to learn more about Upper GI Endoscopy: What to Expect at Home.     .     © 3914-0750 Healthwise, Incorporated. Care instructions adapted under license by [facility]. This care instruction is for use with your licensed healthcare professional. If you have questions about a medical condition or this instruction, always ask your healthcare professional. [place] [address] any warranty or liability for your use of this information.   Content Version: 9.4.088883; Last Revised: February 20, 2013

## 2023-03-31 NOTE — OP NOTE
Operative Note      Patient: Antoine Cespedes  YOB: 1960  MRN: [id number]    Date of Procedure: 3/31/2023    Pre-Op Diagnosis: History of rectal polyps [Z87.19]  Hemorrhage of rectum and anus [K62.5]  Family hx of colon cancer [Z80.0]  Gastroesophageal reflux disease, unspecified whether esophagitis present [K21.9]    Post-Op Diagnosis: As below       Procedure(s):  ANAL PROCTO SIGMOIDOSCOPY FLEXIBLE  EGD BIOPSY    Surgeon(s):  Linda Esparza MD    Assistant:   * No surgical staff found *    Anesthesia: Monitor Anesthesia Care    Estimated Blood Loss (mL): Minimal    Complications:   Specimens:   ID Type Source Tests Collected by Time Destination   A : RANDOM STOMACH BIOPSY Tissue Stomach SURGICAL PATHOLOGY Linda Esparza MD 3/31/2023 1133    B : STOMACH POLYP Tissue Stomach SURGICAL PATHOLOGY Pravin James MD 3/31/2023 1133    C : GE JUNCTION ULCER BIOPSY Tissue GE Junction Biopsy SURGICAL PATHOLOGY Linda Esparza MD 3/31/2023 1136        Implants:  * No implants in log *      Drains: * No LDAs found *    Findings:        There is large anal papilla with large hemorrhoids with red welts indicating recent bleeding from 2 of them I went ahead and put a rubber band on those 2  Using the 6 shooter banding kit      Detailed Description of Procedure:   Scope was introduced we reached around 40 cm where the solid stool start and we came back examined the rectum with retroflex as above  The findings as above    Electronically signed by Linda Esparza MD on 3/31/2023 at 11:58 AM

## 2023-03-31 NOTE — OP NOTE
PROCEDURE NOTE    DATE OF PROCEDURE: 3/31/2023     SURGEON: Pam Vail MD  Facility: [facility]  ASSISTANT: None  Anesthesia: mac   PREOPERATIVE DIAGNOSIS:   GERD    Diagnosis:  Tiny little ulcer at the GE junction biopsy was taken gently      Gastritis biopsies were taken    Close to the pyloric orifice there is a tiny sessile flat polypoidal lesion biopsy was taken        POSTOPERATIVE DIAGNOSIS: As described below    OPERATION: Upper GI endoscopy with Biopsy    ANESTHESIA: Moderate Sedation     ESTIMATED BLOOD LOSS: Less than 50 ml    COMPLICATIONS: None. SPECIMENS:  Was Obtained:   As above     HISTORY: The patient is a 58y.o. year old female with history of above preop diagnosis. I recommended esophagogastroduodenoscopy with possible biopsy and I explained the risk, benefits, expected outcome, and alternatives to the procedure. Risks included but are not limited to bleeding, infection, respiratory distress, hypotension, and perforation of the esophagus, stomach, or duodenum. Patient understands and is in agreement. The patient was counseled at length about the risks of alvarez Covid-19 during their perioperative period and any recovery window from their procedure. The patient was made aware that alvarez Covid-19  may worsen their prognosis for recovering from their procedure  and lend to a higher morbidity and/or mortality risk. All material risks, benefits, and reasonable alternatives including postponing the procedure were discussed. The patient does wish to proceed with the procedure at this time. PROCEDURE: The patient was given IV conscious sedation. The patient's SPO2 remained above 90% throughout the procedure. The gastroscope was inserted orally and advanced under direct vision through the esophagus, through the stomach, through the pylorus, and into the descending duodenum. Post sedation note : The patient's SPO2 remained above 90% throughout the procedure. the vital signs remained stable , and no immediate complication form the procedure noted, patient will be ready for d/c when criteria is met . Findings:    Retropharyngeal area was grossly normal appearing    Esophagus: abnormal: Tiny little ulcer at the GE junction biopsy was taken gently        Stomach:    Gastritis biopsies were taken    Close to the pyloric orifice there is a tiny sessile flat polypoidal lesion biopsy was taken    Duodenum:     Descending: normal    Bulb: normal    The scope was removed and the patient tolerated the procedure well.      Recommendations/Plan:   F/U Biopsies  F/U In Office in 3-4 weeks  Discussed with the family    Electronically signed by Miracle Navarrete MD  on 3/31/2023 at 11:52 AM

## 2023-03-31 NOTE — ANESTHESIA PRE PROCEDURE
Department of Anesthesiology  Preprocedure Note       Name:  Arley Ulloa   Age:  58 y.o.  :  1960                                          MRN:  [id number]         Date:  3/31/2023      Surgeon: Bud Freeman):  Shira Hansen MD    Procedure: Procedure(s):  ANAL PROCTO SIGMOIDOSCOPY FLEXIBLE  EGD ESOPHAGOGASTRODUODENOSCOPY    Medications prior to admission:   Prior to Admission medications    Medication Sig Start Date End Date Taking? Authorizing Provider   Dulaglutide (TRULICITY) 3 OB/2.4VC SOPN Inject 3 mg into the skin once a week 3/29/23   Catherine Zapien PA-C   Continuous Blood Gluc Sensor (FREESTYLE GABRIELLA 3 SENSOR) MISC Apply every 14 days for CGM 3/29/23   Catherine Zapien PA-C   pantoprazole (PROTONIX) 40 MG tablet Take 1 tablet by mouth every morning (before breakfast) 3/27/23   Pravin James MD   metoprolol tartrate (LOPRESSOR) 25 MG tablet TAKE ONE TABLET BY MOUTH TWICE A DAY 3/1/23   Catherine Zapien PA-C   fluticasone [facility]) 50 MCG/ACT nasal spray 2 sprays by Each Nostril route daily 3/1/23   Catherine Zapien PA-C   cetirizine (ZYRTEC) 10 MG tablet Take 1 tablet by mouth daily 3/1/23   Catherine Zapien PA-C   Continuous Blood Gluc Sensor (FREESTYLE GABRIELLA 14 DAY SENSOR) MISC USE TO MONITOR BLOOD SUGAR CONTINUOUSLY 23   Catherine Zapien PA-C   Continuous Blood Gluc  (FREESTYLE GABRIELLA 14 DAY READER) [address] USE WITH SENSOR TO MONITOR BLOOD SUGAR CONTINUOUSLY 23   Catherine Zapien PA-C   pantoprazole ([place] [address]) 20 MG tablet TAKE ONE TABLET BY MOUTH EVERY MORNING BEFORE BREAKFAST 3/30/22   TAHIR Sanchez.  Devices MISC 1 PAIR OF DIABETIC SHOES (1 LEFT/ 1 RIGHT)  1-3 PAIRS OF INSERTS (LEFT/ RIGHT) 3/28/22   Sowmya Del Real, DPM   Handicap Placard MISC by Does not apply route Dx: Osteoarthritis knees, foot deformity    Exp: 3/23/3027 3/23/22   Catherine Zapien PA-C   acetaminophen (TYLENOL) 325 MG tablet Take 650 mg by mouth every 6 hours as needed for Pain    Historical Provider, MD

## 2023-03-31 NOTE — ANESTHESIA POSTPROCEDURE EVALUATION
Department of Anesthesiology  Postprocedure Note    Patient: Osmani Orta  MRN: [id number]  YOB: 1960  Date of evaluation: 3/31/2023      Procedure Summary     Date: 03/31/23 Room / Location: [place] [address] / [facility] - INPATIENT    Anesthesia Start: 1056 Anesthesia Stop: 8817    Procedures:       ANAL PROCTO SIGMOIDOSCOPY FLEXIBLE      EGD BIOPSY Diagnosis:       History of rectal polyps      Hemorrhage of rectum and anus      Family hx of colon cancer      Gastroesophageal reflux disease, unspecified whether esophagitis present      (History of rectal polyps [Z87.19])      (Hemorrhage of rectum and anus [K62.5])      (Family hx of colon cancer [Z80.0])      (Gastroesophageal reflux disease, unspecified whether esophagitis present [K21.9])    Surgeons: Asia Merino MD Responsible Provider: Roxianne Kocher, DO    Anesthesia Type: MAC ASA Status: 3          Anesthesia Type: MAC    Etienne Phase I:      Etienne Phase II: Etienne Score: 9      Anesthesia Post Evaluation    Patient location during evaluation: PACU  Patient participation: complete - patient participated  Level of consciousness: awake and alert  Airway patency: patent  Nausea & Vomiting: no nausea and no vomiting  Complications: no  Cardiovascular status: hemodynamically stable  Respiratory status: acceptable  Hydration status: stable

## 2023-03-31 NOTE — H&P
ARTHROSCOPY Right      [facility] STEROTACTIC LOC BREAST BIOPSY LEFT Left 2/10/2022     ROSE STEROTACTIC LOC BREAST BIOPSY LEFT 2/10/2022 [facility]            CURRENT MEDICATIONS:    Current Medication      Current Outpatient Medications:     pantoprazole (PROTONIX) 40 MG tablet, Take 1 tablet by mouth every morning (before breakfast), Disp: 30 tablet, Rfl: 5    metoprolol tartrate (LOPRESSOR) 25 MG tablet, TAKE ONE TABLET BY MOUTH TWICE A DAY, Disp: 60 tablet, Rfl: 2    metFORMIN (GLUCOPHAGE) 500 MG tablet, Take 1 tablet by mouth 2 times daily (with meals), Disp: 180 tablet, Rfl: 1    fluticasone (FLONASE) 50 MCG/ACT nasal spray, 2 sprays by Each Nostril route daily, Disp: 16 g, Rfl: 0    cetirizine (ZYRTEC) 10 MG tablet, Take 1 tablet by mouth daily, Disp: 30 tablet, Rfl: 0    dulaglutide (TRULICITY) 1.5 AE/3.8IM SC injection, Inject 0.5 mLs into the skin once a week, Disp: 4 Adjustable Dose Pre-filled Pen Syringe, Rfl: 0    Continuous Blood Gluc Sensor (FREESTYLE GABRIELLA 14 DAY SENSOR) [facility], USE TO MONITOR BLOOD SUGAR CONTINUOUSLY, Disp: 2 each, Rfl: 3    Continuous Blood Gluc  (FREESTYLE GABRIELLA 14 DAY READER) [facility]DY, USE WITH SENSOR TO MONITOR BLOOD SUGAR CONTINUOUSLY, Disp: 1 each, Rfl: 3    pantoprazole (PROTONIX) 20 MG tablet, TAKE ONE TABLET BY MOUTH EVERY MORNING BEFORE BREAKFAST, Disp: 30 tablet, Rfl: 2    Calcium Carbonate-Vit D-Min (CALTRATE 600+D PLUS MINERALS) 600-800 MG-UNIT CHEW, Take 1 tablet by mouth daily, Disp: 60 tablet, Rfl: 6    Misc.  Devices MISC, 1 PAIR OF DIABETIC SHOES (1 LEFT/ 1 RIGHT) 1-3 PAIRS OF INSERTS (LEFT/ RIGHT), Disp: 2 each, Rfl: 0    Handicap Placard MISC, by Does not apply route Dx: Osteoarthritis knees, foot deformity  Exp: 3/23/3027, Disp: 1 each, Rfl: 0    acetaminophen (TYLENOL) 325 MG tablet, Take 650 mg by mouth every 6 hours as needed for Pain, Disp: , Rfl:     bisacodyl (DULCOLAX) 5 MG EC tablet, TAKE 4 TABS AT 10 AM THE DAY PRIOR TO COLONOSCOPY, Disp: 4 tablet, Rfl: Medication where reviewed, side effects from the GI medication were reviewed with the patient  We did refill the GI medications                 Diet/life style/natural hx /complication of the dx were all explained in details   Past medical, past surgical, social history, psychiatric history, medications or allergies, all reviewed and  updated     Thank you for allowing me to participate in the care of Ms. Domínguez. For any further questions please do not hesitate to contact me. I have reviewed and agree with the ROS entered by the MA/RN. Note is dictated utilizing voice recognition software. Unfortunately this leads to occasional typographical errors. Please contact our office if you have any questions.         Brady Garcia MD  [facility]  O: #[phone]

## 2023-04-03 ENCOUNTER — TELEPHONE (OUTPATIENT)
Dept: PRIMARY CARE CLINIC | Age: 63
End: 2023-04-03

## 2023-04-03 NOTE — TELEPHONE ENCOUNTER
The patient called stating that she has a painful ingrown toe nail, she has contacted her prodiatrist's office and he is not in the office at this time and was advised to contact her PCP. Please advise what the patient should do.

## 2023-04-04 ENCOUNTER — HOSPITAL ENCOUNTER (EMERGENCY)
Age: 63
Discharge: HOME | End: 2023-04-04
Attending: EMERGENCY MEDICINE
Payer: MEDICAID

## 2023-04-04 ENCOUNTER — APPOINTMENT (OUTPATIENT)
Dept: GENERAL RADIOLOGY | Age: 63
End: 2023-04-04
Payer: MEDICAID

## 2023-04-04 VITALS
HEIGHT: 69 IN | RESPIRATION RATE: 18 BRPM | BODY MASS INDEX: 42.06 KG/M2 | HEART RATE: 69 BPM | WEIGHT: 284 LBS | TEMPERATURE: 98 F | DIASTOLIC BLOOD PRESSURE: 64 MMHG | SYSTOLIC BLOOD PRESSURE: 121 MMHG | OXYGEN SATURATION: 97 %

## 2023-04-04 DIAGNOSIS — L60.0 INGROWN TOENAIL: Primary | ICD-10-CM

## 2023-04-04 LAB
ABSOLUTE EOS #: 0.28 K/UL (ref 0–0.44)
ABSOLUTE IMMATURE GRANULOCYTE: 0.02 K/UL (ref 0–0.3)
ABSOLUTE LYMPH #: 2.2 K/UL (ref 1.1–3.7)
ABSOLUTE MONO #: 0.64 K/UL (ref 0.1–1.2)
ANION GAP SERPL CALCULATED.3IONS-SCNC: 10 MMOL/L (ref 9–17)
BASOPHILS # BLD: 1 % (ref 0–2)
BASOPHILS ABSOLUTE: 0.06 K/UL (ref 0–0.2)
BUN SERPL-MCNC: 13 MG/DL (ref 8–23)
BUN/CREAT BLD: 15 (ref 9–20)
CALCIUM SERPL-MCNC: 9.2 MG/DL (ref 8.6–10.4)
CHLORIDE SERPL-SCNC: 104 MMOL/L (ref 98–107)
CO2 SERPL-SCNC: 26 MMOL/L (ref 20–31)
CREAT SERPL-MCNC: 0.86 MG/DL (ref 0.5–0.9)
CRP SERPL HS-MCNC: 8.4 MG/L (ref 0–5)
EOSINOPHILS RELATIVE PERCENT: 4 % (ref 1–4)
ERYTHROCYTE [SEDIMENTATION RATE] IN BLOOD BY WESTERGREN METHOD: 16 MM/HR (ref 0–30)
GFR SERPL CREATININE-BSD FRML MDRD: >60 ML/MIN/1.73M2
GLUCOSE SERPL-MCNC: 100 MG/DL (ref 70–99)
HCT VFR BLD AUTO: 40.3 % (ref 36.3–47.1)
HGB BLD-MCNC: 13 G/DL (ref 11.9–15.1)
IMMATURE GRANULOCYTES: 0 %
LYMPHOCYTES # BLD: 35 % (ref 24–43)
MCH RBC QN AUTO: 28 PG (ref 25.2–33.5)
MCHC RBC AUTO-ENTMCNC: 32.3 G/DL (ref 28.4–34.8)
MCV RBC AUTO: 86.9 FL (ref 82.6–102.9)
MONOCYTES # BLD: 10 % (ref 3–12)
NRBC AUTOMATED: 0 PER 100 WBC
PDW BLD-RTO: 13.9 % (ref 11.8–14.4)
PLATELET # BLD AUTO: 262 K/UL (ref 138–453)
PMV BLD AUTO: 9.4 FL (ref 8.1–13.5)
POTASSIUM SERPL-SCNC: 3.8 MMOL/L (ref 3.7–5.3)
RBC # BLD: 4.64 M/UL (ref 3.95–5.11)
SEG NEUTROPHILS: 50 % (ref 36–65)
SEGMENTED NEUTROPHILS ABSOLUTE COUNT: 3.13 K/UL (ref 1.5–8.1)
SODIUM SERPL-SCNC: 140 MMOL/L (ref 135–144)
SURGICAL PATHOLOGY REPORT: NORMAL
WBC # BLD AUTO: 6.3 K/UL (ref 3.5–11.3)

## 2023-04-04 PROCEDURE — 85652 RBC SED RATE AUTOMATED: CPT

## 2023-04-04 PROCEDURE — 80048 BASIC METABOLIC PNL TOTAL CA: CPT

## 2023-04-04 PROCEDURE — 85025 COMPLETE CBC W/AUTO DIFF WBC: CPT

## 2023-04-04 PROCEDURE — 2500000003 HC RX 250 WO HCPCS: Performed by: EMERGENCY MEDICINE

## 2023-04-04 PROCEDURE — 10060 I&D ABSCESS SIMPLE/SINGLE: CPT

## 2023-04-04 PROCEDURE — 99284 EMERGENCY DEPT VISIT MOD MDM: CPT

## 2023-04-04 PROCEDURE — 73630 X-RAY EXAM OF FOOT: CPT

## 2023-04-04 PROCEDURE — 86140 C-REACTIVE PROTEIN: CPT

## 2023-04-04 RX ORDER — GINSENG 100 MG
CAPSULE ORAL 3 TIMES DAILY
Status: DISCONTINUED | OUTPATIENT
Start: 2023-04-04 | End: 2023-04-04 | Stop reason: HOSPADM

## 2023-04-04 RX ORDER — LIDOCAINE HYDROCHLORIDE 10 MG/ML
20 INJECTION, SOLUTION INFILTRATION; PERINEURAL ONCE
Status: DISCONTINUED | OUTPATIENT
Start: 2023-04-04 | End: 2023-04-04 | Stop reason: CLARIF

## 2023-04-04 RX ORDER — CEPHALEXIN 500 MG/1
500 CAPSULE ORAL 4 TIMES DAILY
Qty: 28 CAPSULE | Refills: 0 | Status: SHIPPED | OUTPATIENT
Start: 2023-04-04 | End: 2023-04-11

## 2023-04-04 RX ORDER — LIDOCAINE HYDROCHLORIDE 10 MG/ML
20 INJECTION, SOLUTION EPIDURAL; INFILTRATION; INTRACAUDAL; PERINEURAL ONCE
Status: DISCONTINUED | OUTPATIENT
Start: 2023-04-04 | End: 2023-04-04 | Stop reason: HOSPADM

## 2023-04-04 RX ADMIN — LIDOCAINE HYDROCHLORIDE 20 ML: 10 INJECTION, SOLUTION INFILTRATION; PERINEURAL at 10:48

## 2023-04-04 ASSESSMENT — ENCOUNTER SYMPTOMS
VOMITING: 0
DIARRHEA: 0
SHORTNESS OF BREATH: 0
NAUSEA: 0
COUGH: 0
RHINORRHEA: 0
SORE THROAT: 0
EYE DISCHARGE: 0
COLOR CHANGE: 0
EYE REDNESS: 0

## 2023-04-04 ASSESSMENT — PAIN - FUNCTIONAL ASSESSMENT: PAIN_FUNCTIONAL_ASSESSMENT: 0-10

## 2023-04-04 ASSESSMENT — PAIN SCALES - GENERAL: PAINLEVEL_OUTOF10: 10

## 2023-04-04 NOTE — ED PROVIDER NOTES
for Pain    METOPROLOL TARTRATE (LOPRESSOR) 25 MG TABLET    TAKE ONE TABLET BY MOUTH TWICE A DAY    MISC. DEVICES MISC    1 PAIR OF DIABETIC SHOES (1 LEFT/ 1 RIGHT)  1-3 PAIRS OF INSERTS (LEFT/ RIGHT)    PANTOPRAZOLE (PROTONIX) 20 MG TABLET    TAKE ONE TABLET BY MOUTH EVERY MORNING BEFORE BREAKFAST    PANTOPRAZOLE (PROTONIX) 40 MG TABLET    Take 1 tablet by mouth every morning (before breakfast)    POLYETHYLENE GLYCOL (GLYCOLAX) 17 GM/SCOOP POWDER    Use as directed by following your patient instructions given by office. ALLERGIES     has No Known Allergies. FAMILY HISTORY     She indicated that the status of her mother is unknown. SOCIAL HISTORY       Social History     Tobacco Use    Smoking status: Never    Smokeless tobacco: Never   Vaping Use    Vaping Use: Never used   Substance Use Topics    Alcohol use: No    Drug use: No     PHYSICAL EXAM     INITIAL VITALS: /64   Pulse 69   Temp 98 °F (36.7 °C) (Oral)   Resp 18   Ht 5' 9\" (1.753 m)   Wt 284 lb (128.8 kg)   LMP 02/28/2015 (Approximate)   SpO2 97%   BMI 41.94 kg/m²    Physical Exam  Constitutional:       Appearance: Normal appearance. She is well-developed. She is not ill-appearing or toxic-appearing. HENT:      Head: Normocephalic and atraumatic. Eyes:      Conjunctiva/sclera: Conjunctivae normal.      Pupils: Pupils are equal, round, and reactive to light. Neck:      Trachea: Trachea normal.   Cardiovascular:      Rate and Rhythm: Normal rate and regular rhythm. Heart sounds: S1 normal and S2 normal. No murmur heard. Pulmonary:      Effort: Pulmonary effort is normal. No accessory muscle usage or respiratory distress. Breath sounds: Normal breath sounds. Chest:      Chest wall: No deformity or tenderness. Abdominal:      General: Bowel sounds are normal. There is no distension or abdominal bruit. Palpations: Abdomen is not rigid. Tenderness: There is no abdominal tenderness.  There is no guarding or

## 2023-04-04 NOTE — ED NOTES
Pt to ED from home with c/o rt second toe pain. Pt reports hx of diabetes and concern for an ingrown toe nail. Pt denies injuries or cuts to the area. Toe is red and warm. Cap refill less than 3 sec, full sensation, pedal pulses equal and strong. Pt resting on ED stretcher at this time, call light within reach,  at bedside.       Melania Solorzano RN  04/04/23 4315

## 2023-04-17 RX ORDER — DULAGLUTIDE 1.5 MG/.5ML
INJECTION, SOLUTION SUBCUTANEOUS
Qty: 4 ML | Refills: 2 | Status: SHIPPED | OUTPATIENT
Start: 2023-04-17

## 2023-04-29 ENCOUNTER — TELEPHONE (OUTPATIENT)
Dept: PRIMARY CARE CLINIC | Age: 63
End: 2023-04-29

## 2023-05-05 ENCOUNTER — TELEPHONE (OUTPATIENT)
Dept: GASTROENTEROLOGY | Age: 63
End: 2023-05-05

## 2023-05-05 DIAGNOSIS — K21.9 GASTROESOPHAGEAL REFLUX DISEASE, UNSPECIFIED WHETHER ESOPHAGITIS PRESENT: ICD-10-CM

## 2023-05-05 DIAGNOSIS — Z80.0 FAMILY HISTORY OF COLON CANCER: ICD-10-CM

## 2023-05-05 DIAGNOSIS — Z87.19 HISTORY OF RECTAL POLYPS: ICD-10-CM

## 2023-05-05 DIAGNOSIS — K62.5 HEMORRHAGE OF RECTUM AND ANUS: ICD-10-CM

## 2023-05-05 DIAGNOSIS — A04.8 H. PYLORI INFECTION: Primary | ICD-10-CM

## 2023-05-05 RX ORDER — OMEPRAZOLE 20 MG/1
20 CAPSULE, DELAYED RELEASE ORAL 2 TIMES DAILY
Qty: 56 CAPSULE | Refills: 0 | Status: SHIPPED | OUTPATIENT
Start: 2023-05-05 | End: 2023-06-02

## 2023-05-05 RX ORDER — CLARITHROMYCIN 500 MG/1
500 TABLET, COATED ORAL 2 TIMES DAILY
Qty: 28 TABLET | Refills: 0 | Status: SHIPPED | OUTPATIENT
Start: 2023-05-05 | End: 2023-05-19

## 2023-05-05 RX ORDER — AMOXICILLIN 500 MG/1
1000 TABLET, FILM COATED ORAL 2 TIMES DAILY
Qty: 56 TABLET | Refills: 0 | Status: SHIPPED | OUTPATIENT
Start: 2023-05-05 | End: 2023-05-19

## 2023-05-05 NOTE — TELEPHONE ENCOUNTER
Result reviewed with Dr Ron Jansen. Writer called and  informed pt of H Pylori infection/treatment. Writer prepared and signed script per Dr Ron Jansen.

## 2023-05-11 ENCOUNTER — HOSPITAL ENCOUNTER (OUTPATIENT)
Dept: MAMMOGRAPHY | Age: 63
Discharge: HOME | End: 2023-05-13
Payer: MEDICAID

## 2023-05-11 DIAGNOSIS — R92.1 CALCIFICATION OF RIGHT BREAST: Primary | ICD-10-CM

## 2023-05-11 DIAGNOSIS — Z12.31 ENCOUNTER FOR SCREENING MAMMOGRAM FOR MALIGNANT NEOPLASM OF BREAST: ICD-10-CM

## 2023-05-11 PROCEDURE — 77063 BREAST TOMOSYNTHESIS BI: CPT

## 2023-05-12 ENCOUNTER — TELEPHONE (OUTPATIENT)
Dept: PODIATRY | Age: 63
End: 2023-05-12

## 2023-05-17 ENCOUNTER — OFFICE VISIT (OUTPATIENT)
Dept: PODIATRY | Age: 63
End: 2023-05-17
Payer: MEDICAID

## 2023-05-17 VITALS — HEIGHT: 69 IN | WEIGHT: 284 LBS | BODY MASS INDEX: 42.06 KG/M2

## 2023-05-17 DIAGNOSIS — M20.42 HAMMER TOES OF BOTH FEET: ICD-10-CM

## 2023-05-17 DIAGNOSIS — M79.672 PAIN IN BOTH FEET: ICD-10-CM

## 2023-05-17 DIAGNOSIS — M79.671 PAIN IN BOTH FEET: ICD-10-CM

## 2023-05-17 DIAGNOSIS — R26.9 GAIT ABNORMALITY: ICD-10-CM

## 2023-05-17 DIAGNOSIS — M20.41 HAMMER TOES OF BOTH FEET: ICD-10-CM

## 2023-05-17 DIAGNOSIS — I73.9 PVD (PERIPHERAL VASCULAR DISEASE) (HCC): ICD-10-CM

## 2023-05-17 DIAGNOSIS — E11.9 TYPE 2 DIABETES MELLITUS WITHOUT COMPLICATION, WITHOUT LONG-TERM CURRENT USE OF INSULIN (HCC): Primary | ICD-10-CM

## 2023-05-17 PROCEDURE — 99213 OFFICE O/P EST LOW 20 MIN: CPT | Performed by: PODIATRIST

## 2023-05-17 PROCEDURE — 3051F HG A1C>EQUAL 7.0%<8.0%: CPT | Performed by: PODIATRIST

## 2023-05-17 PROCEDURE — 11721 DEBRIDE NAIL 6 OR MORE: CPT | Performed by: PODIATRIST

## 2023-05-22 DIAGNOSIS — E11.9 TYPE 2 DIABETES MELLITUS WITHOUT COMPLICATION, WITHOUT LONG-TERM CURRENT USE OF INSULIN (HCC): ICD-10-CM

## 2023-05-22 DIAGNOSIS — A04.8 H. PYLORI INFECTION: ICD-10-CM

## 2023-05-22 DIAGNOSIS — H93.8X3 EAR FULLNESS, BILATERAL: ICD-10-CM

## 2023-05-22 RX ORDER — DULAGLUTIDE 3 MG/.5ML
INJECTION, SOLUTION SUBCUTANEOUS
Qty: 2 ML | Refills: 2 | Status: SHIPPED | OUTPATIENT
Start: 2023-05-22

## 2023-05-22 RX ORDER — FLUTICASONE PROPIONATE 50 MCG
SPRAY, SUSPENSION (ML) NASAL
Qty: 16 G | Refills: 2 | Status: SHIPPED | OUTPATIENT
Start: 2023-05-22

## 2023-05-22 RX ORDER — CETIRIZINE HYDROCHLORIDE 10 MG/1
TABLET ORAL
Qty: 30 TABLET | Refills: 0 | Status: SHIPPED | OUTPATIENT
Start: 2023-05-22

## 2023-05-23 RX ORDER — OMEPRAZOLE 20 MG/1
CAPSULE, DELAYED RELEASE ORAL
Qty: 56 CAPSULE | Refills: 0 | OUTPATIENT
Start: 2023-05-23

## 2023-05-23 RX ORDER — CLARITHROMYCIN 500 MG/1
TABLET, COATED ORAL
Qty: 28 TABLET | Refills: 0 | OUTPATIENT
Start: 2023-05-23

## 2023-05-26 ENCOUNTER — HOSPITAL ENCOUNTER (OUTPATIENT)
Dept: WOMENS IMAGING | Age: 63
End: 2023-05-26
Payer: MEDICAID

## 2023-05-26 DIAGNOSIS — R92.1 CALCIFICATION OF RIGHT BREAST: ICD-10-CM

## 2023-05-26 PROCEDURE — G0279 TOMOSYNTHESIS, MAMMO: HCPCS

## 2023-05-30 DIAGNOSIS — R92.1 BREAST CALCIFICATION SEEN ON MAMMOGRAM: Primary | ICD-10-CM

## 2023-05-31 DIAGNOSIS — A04.8 H. PYLORI INFECTION: ICD-10-CM

## 2023-05-31 DIAGNOSIS — R92.1 BREAST CALCIFICATION, RIGHT: Primary | ICD-10-CM

## 2023-05-31 RX ORDER — OMEPRAZOLE 20 MG/1
CAPSULE, DELAYED RELEASE ORAL
Qty: 56 CAPSULE | Refills: 0 | OUTPATIENT
Start: 2023-05-31

## 2023-06-16 ENCOUNTER — HOSPITAL ENCOUNTER (OUTPATIENT)
Dept: WOMENS IMAGING | Age: 63
Discharge: HOME | End: 2023-06-18
Payer: MEDICAID

## 2023-06-16 VITALS — DIASTOLIC BLOOD PRESSURE: 80 MMHG | SYSTOLIC BLOOD PRESSURE: 134 MMHG | HEART RATE: 64 BPM

## 2023-06-16 DIAGNOSIS — R92.1 BREAST CALCIFICATION, RIGHT: ICD-10-CM

## 2023-06-16 PROCEDURE — 19081 BX BREAST 1ST LESION STRTCTC: CPT

## 2023-06-16 PROCEDURE — 88305 TISSUE EXAM BY PATHOLOGIST: CPT

## 2023-06-16 PROCEDURE — 77065 DX MAMMO INCL CAD UNI: CPT

## 2023-06-19 ENCOUNTER — TELEPHONE (OUTPATIENT)
Dept: WOMENS IMAGING | Age: 63
End: 2023-06-19

## 2023-06-19 NOTE — TELEPHONE ENCOUNTER
Contacted patient post biopsy of the right breast.  She is having some tenderness, which can be expected. I reminded her to obtain the results of her biopsy from the office of [facility]. There were no further questions or concerns at the time of the call.   Offered assistance to her in the future, if it is needed. / Electronically signed by ISAURO Kelley CBIN on 6/19/2023 at 11:17 AM

## 2023-06-20 ENCOUNTER — HOSPITAL ENCOUNTER (OUTPATIENT)
Dept: ULTRASOUND IMAGING | Age: 63
Discharge: HOME | End: 2023-06-22
Attending: INTERNAL MEDICINE
Payer: MEDICAID

## 2023-06-20 DIAGNOSIS — Z80.0 FAMILY HISTORY OF PANCREATIC CANCER: ICD-10-CM

## 2023-06-20 DIAGNOSIS — Z87.42 HISTORY OF POSTMENOPAUSAL BLEEDING: ICD-10-CM

## 2023-06-20 DIAGNOSIS — Z91.89 AT HIGH RISK FOR BREAST CANCER: ICD-10-CM

## 2023-06-20 PROCEDURE — 76856 US EXAM PELVIC COMPLETE: CPT

## 2023-06-21 LAB — SURGICAL PATHOLOGY REPORT: NORMAL

## 2023-06-28 ENCOUNTER — TELEPHONE (OUTPATIENT)
Dept: PRIMARY CARE CLINIC | Age: 63
End: 2023-06-28

## 2023-06-28 ENCOUNTER — OFFICE VISIT (OUTPATIENT)
Dept: PRIMARY CARE CLINIC | Age: 63
End: 2023-06-28
Payer: MEDICAID

## 2023-06-28 VITALS
HEIGHT: 69 IN | OXYGEN SATURATION: 96 % | WEIGHT: 270.6 LBS | BODY MASS INDEX: 40.08 KG/M2 | RESPIRATION RATE: 16 BRPM | DIASTOLIC BLOOD PRESSURE: 82 MMHG | HEART RATE: 71 BPM | SYSTOLIC BLOOD PRESSURE: 122 MMHG

## 2023-06-28 DIAGNOSIS — I10 PRIMARY HYPERTENSION: ICD-10-CM

## 2023-06-28 DIAGNOSIS — E78.5 DYSLIPIDEMIA: ICD-10-CM

## 2023-06-28 DIAGNOSIS — H93.8X3 EAR FULLNESS, BILATERAL: ICD-10-CM

## 2023-06-28 DIAGNOSIS — E11.9 TYPE 2 DIABETES MELLITUS WITHOUT COMPLICATION, WITHOUT LONG-TERM CURRENT USE OF INSULIN (HCC): Primary | ICD-10-CM

## 2023-06-28 LAB — HBA1C MFR BLD: 5.1 %

## 2023-06-28 PROCEDURE — 3044F HG A1C LEVEL LT 7.0%: CPT | Performed by: PHYSICIAN ASSISTANT

## 2023-06-28 PROCEDURE — 3074F SYST BP LT 130 MM HG: CPT | Performed by: PHYSICIAN ASSISTANT

## 2023-06-28 PROCEDURE — 3079F DIAST BP 80-89 MM HG: CPT | Performed by: PHYSICIAN ASSISTANT

## 2023-06-28 PROCEDURE — 83037 HB GLYCOSYLATED A1C HOME DEV: CPT | Performed by: PHYSICIAN ASSISTANT

## 2023-06-28 PROCEDURE — 99214 OFFICE O/P EST MOD 30 MIN: CPT | Performed by: PHYSICIAN ASSISTANT

## 2023-06-28 RX ORDER — FLASH GLUCOSE SENSOR
KIT MISCELLANEOUS
Qty: 2 EACH | Refills: 3 | Status: SHIPPED | OUTPATIENT
Start: 2023-06-28

## 2023-06-28 RX ORDER — FLUTICASONE PROPIONATE 50 MCG
SPRAY, SUSPENSION (ML) NASAL
Qty: 16 G | Refills: 2 | Status: SHIPPED | OUTPATIENT
Start: 2023-06-28

## 2023-06-28 ASSESSMENT — PATIENT HEALTH QUESTIONNAIRE - PHQ9
SUM OF ALL RESPONSES TO PHQ9 QUESTIONS 1 & 2: 0
1. LITTLE INTEREST OR PLEASURE IN DOING THINGS: 0
SUM OF ALL RESPONSES TO PHQ QUESTIONS 1-9: 0
2. FEELING DOWN, DEPRESSED OR HOPELESS: 0
SUM OF ALL RESPONSES TO PHQ QUESTIONS 1-9: 0

## 2023-06-28 ASSESSMENT — ENCOUNTER SYMPTOMS
SINUS PAIN: 0
BACK PAIN: 0
CONSTIPATION: 0
DIARRHEA: 0
RHINORRHEA: 0
COUGH: 0
VOMITING: 0
SHORTNESS OF BREATH: 0
NAUSEA: 0
ABDOMINAL PAIN: 0

## 2023-07-04 DIAGNOSIS — H93.8X3 EAR FULLNESS, BILATERAL: ICD-10-CM

## 2023-07-05 RX ORDER — CETIRIZINE HYDROCHLORIDE 10 MG/1
TABLET ORAL
Qty: 30 TABLET | Refills: 0 | Status: SHIPPED | OUTPATIENT
Start: 2023-07-05

## 2023-07-10 ENCOUNTER — OFFICE VISIT (OUTPATIENT)
Dept: ONCOLOGY | Age: 63
End: 2023-07-10
Payer: MEDICAID

## 2023-07-10 ENCOUNTER — TELEPHONE (OUTPATIENT)
Dept: ONCOLOGY | Age: 63
End: 2023-07-10

## 2023-07-10 VITALS
HEART RATE: 69 BPM | RESPIRATION RATE: 16 BRPM | TEMPERATURE: 97.2 F | DIASTOLIC BLOOD PRESSURE: 78 MMHG | SYSTOLIC BLOOD PRESSURE: 145 MMHG | WEIGHT: 272.1 LBS | BODY MASS INDEX: 40.18 KG/M2

## 2023-07-10 DIAGNOSIS — Z87.42 HISTORY OF POSTMENOPAUSAL BLEEDING: ICD-10-CM

## 2023-07-10 DIAGNOSIS — Z91.89 AT HIGH RISK FOR BREAST CANCER: Primary | ICD-10-CM

## 2023-07-10 PROCEDURE — 99214 OFFICE O/P EST MOD 30 MIN: CPT | Performed by: INTERNAL MEDICINE

## 2023-07-10 PROCEDURE — 99211 OFF/OP EST MAY X REQ PHY/QHP: CPT

## 2023-07-10 PROCEDURE — 3078F DIAST BP <80 MM HG: CPT | Performed by: INTERNAL MEDICINE

## 2023-07-10 PROCEDURE — 3077F SYST BP >= 140 MM HG: CPT | Performed by: INTERNAL MEDICINE

## 2023-07-10 NOTE — TELEPHONE ENCOUNTER
Laquita Kendrick MD VISIT  MRI BREAST IN November RV IN November PER DR MAJOR  MRI BREAST PT WILL CALL AND HAVE IT SCHEDULED BEFORE 11/20/23  MD VISIT 11/20/23 @ 10:15AM  AVS PRINTED W/ INSTRUCTIONS AND GIVEN TO PT ON EXIT

## 2023-07-10 NOTE — PROGRESS NOTES
SPECIMEN RADIOGRAPH: A specimen radiograph was performed showing calcifications in 4 of the core samples. POST-PROCEDURE MAMMOGRAM FOR MARKER PLACEMENT: ML and CC views were performed and compared to the pre biopsy images. The biopsy clip is in the correct location with respect to the targeted site. There is no evidence of biopsy clip migration from the biopsy site. Technically successful stereotactic biopsy of the right breast calcifications posterior aspect and HydroMARK T3 Open Coil clip marker placement as described above. BIRADS: ZW - Pathology pending. ROSE POST BX CLIP PLACEMENT RIGHT    Addendum Date: 6/21/2023    ADDENDUM: Right breast, stereotactically guided biopsy at 7 o'clock: Mild ductal dilatation and periductal or fibrosis. Calcifications in the ductal lumen and periductal fibrosis negative for atypia and malignancy. Findings are benign and image concordant. However, six-month mammographic follow-up of the right breast is advised. Result Date: 6/21/2023  EXAMINATION: STEREOTACTIC RIGHT BREAST BIOPSY WITH VACUUM-ASSIST STEREOTACTIC MARKER PLACEMENT SPECIMEN RADIOGRAPH POSTPROCEDURE UNILATERAL DIGITAL MAMMOGRAM FOR MARKER PLACEMENT 6/16/2023 HISTORY: ORDERING SYSTEM PROVIDED HISTORY: Breast calcification, right Long array of calcifications in the posterior right breast lower central location COMPARISON: May 26, 2023 PROCEDURE: Following informed written consent with risks, benefits and alternatives to the procedure discussed, the patient was brought to the stereotactic mammographic suite and placed in prone position in the biopsy unit. Using stereotactic guidance, the calcifications in the lower central posterior right breast were targeted with coordinates transmitted to the biopsy unit. A timeout was performed to confirm patient identification and site of procedure.   The patient was prepped and draped in the standard fashion using maximum sterile technique and 2% lidocaine was used for

## 2023-07-20 ENCOUNTER — TELEPHONE (OUTPATIENT)
Dept: OBGYN CLINIC | Age: 63
End: 2023-07-20

## 2023-07-20 ENCOUNTER — OFFICE VISIT (OUTPATIENT)
Dept: OBGYN CLINIC | Age: 63
End: 2023-07-20
Payer: MEDICAID

## 2023-07-20 VITALS
DIASTOLIC BLOOD PRESSURE: 82 MMHG | HEART RATE: 70 BPM | WEIGHT: 274 LBS | SYSTOLIC BLOOD PRESSURE: 138 MMHG | BODY MASS INDEX: 40.46 KG/M2

## 2023-07-20 DIAGNOSIS — Z01.818 PREOPERATIVE EXAM FOR GYNECOLOGIC SURGERY: ICD-10-CM

## 2023-07-20 DIAGNOSIS — R93.89 ENDOMETRIAL THICKENING ON ULTRASOUND: Primary | ICD-10-CM

## 2023-07-20 DIAGNOSIS — N95.0 PMB (POSTMENOPAUSAL BLEEDING): ICD-10-CM

## 2023-07-20 PROCEDURE — 3079F DIAST BP 80-89 MM HG: CPT | Performed by: STUDENT IN AN ORGANIZED HEALTH CARE EDUCATION/TRAINING PROGRAM

## 2023-07-20 PROCEDURE — 99213 OFFICE O/P EST LOW 20 MIN: CPT | Performed by: STUDENT IN AN ORGANIZED HEALTH CARE EDUCATION/TRAINING PROGRAM

## 2023-07-20 PROCEDURE — 3075F SYST BP GE 130 - 139MM HG: CPT | Performed by: STUDENT IN AN ORGANIZED HEALTH CARE EDUCATION/TRAINING PROGRAM

## 2023-07-20 NOTE — PROGRESS NOTES
Outpatient Medications   Medication Sig Dispense Refill    cetirizine (ZYRTEC) 10 MG tablet TAKE 1 TABLET BY MOUTH DAILY 30 tablet 0    Continuous Blood Gluc Sensor (FREESTYLE GABRIELLA 14 DAY SENSOR) MISC USE TO MONITOR BLOOD SUGAR CONTINUOUSLY 2 each 3    fluticasone (FLONASE) 50 MCG/ACT nasal spray SPRAY 2 SPRAYS IN EACH NOSTRIL DAILY 16 g 2    TRULICITY 3 BF/0.9NF SOPN INJECT 3 MG UNDER THE SKIN ONCE WEEKLY 2 mL 2    Misc. Devices MISC 1 PAIR OF DIABETIC SHOES (1 LEFT/ 1 RIGHT)  1-3 PAIRS OF INSERTS (LEFT/ RIGHT) 2 each 0    Continuous Blood Gluc Sensor (FREESTYLE GABRIELLA 3 SENSOR) MISC Apply every 14 days for CGM 2 each 2    pantoprazole (PROTONIX) 40 MG tablet Take 1 tablet by mouth every morning (before breakfast) 30 tablet 5    metoprolol tartrate (LOPRESSOR) 25 MG tablet TAKE ONE TABLET BY MOUTH TWICE A DAY 60 tablet 2    Continuous Blood Gluc  (FREESTYLE GABRIELLA 14 DAY READER) [facility]DY USE WITH SENSOR TO MONITOR BLOOD SUGAR CONTINUOUSLY 1 each 3    pantoprazole (PROTONIX) 20 MG tablet TAKE ONE TABLET BY MOUTH EVERY MORNING BEFORE BREAKFAST 30 tablet 2    Misc. Devices MISC 1 PAIR OF DIABETIC SHOES (1 LEFT/ 1 RIGHT)  1-3 PAIRS OF INSERTS (LEFT/ RIGHT) 2 each 0    Handicap Placard MISC by Does not apply route Dx: Osteoarthritis knees, foot deformity    Exp: 3/23/3027 1 each 0    acetaminophen (TYLENOL) 325 MG tablet Take 2 tablets by mouth every 6 hours as needed for Pain      ibuprofen (ADVIL;MOTRIN) 200 MG tablet Take 1 tablet by mouth every 6 hours as needed for Pain       No current facility-administered medications for this visit.            ALLERGIES:  Allergies as of 07/20/2023    (No Known Allergies)       REVIEW OF SYSTEMS:  Constitutional: negative fever, negative chills  HEENT: negative visual disturbances, negative headaches  Respiratory: negative dyspnea, negative cough  Cardiovascular: negative chest pain,  negative palpitations  Gastrointestinal: negative abdominal pain, negative RUQ pain, negative

## 2023-07-24 ENCOUNTER — TELEPHONE (OUTPATIENT)
Dept: OBGYN CLINIC | Age: 63
End: 2023-07-24

## 2023-07-24 NOTE — TELEPHONE ENCOUNTER
LVM for patient to call back for surgery date and time. Surgery date 8/30/23 @7:30am    Arrival time for patient @6:00am    PAT 8/21/23 @10:00am    Nothing to eat or drink past midnight the night prior to surgery.

## 2023-07-26 ENCOUNTER — OFFICE VISIT (OUTPATIENT)
Dept: PODIATRY | Age: 63
End: 2023-07-26
Payer: MEDICAID

## 2023-07-26 VITALS — HEIGHT: 69 IN | WEIGHT: 274 LBS | BODY MASS INDEX: 40.58 KG/M2

## 2023-07-26 DIAGNOSIS — M84.372A STRESS FRACTURE, LEFT ANKLE, INITIAL ENCOUNTER FOR FRACTURE: Primary | ICD-10-CM

## 2023-07-26 DIAGNOSIS — M19.072 DEGENERATIVE JOINT DISEASE OF BOTH ANKLES AND FEET: ICD-10-CM

## 2023-07-26 DIAGNOSIS — M25.572 SINUS TARSI SYNDROME OF LEFT ANKLE: ICD-10-CM

## 2023-07-26 DIAGNOSIS — M19.071 DEGENERATIVE JOINT DISEASE OF BOTH ANKLES AND FEET: ICD-10-CM

## 2023-07-26 PROCEDURE — 99213 OFFICE O/P EST LOW 20 MIN: CPT | Performed by: PODIATRIST

## 2023-07-26 RX ORDER — MELOXICAM 15 MG/1
15 TABLET ORAL DAILY
Qty: 30 TABLET | Refills: 3 | Status: SHIPPED | OUTPATIENT
Start: 2023-07-26

## 2023-07-26 NOTE — PROGRESS NOTES
SHOES (1 LEFT/ 1 RIGHT)  1-3 PAIRS OF INSERTS (LEFT/ RIGHT) 5/17/23  Yes Abram Servin DPM   Continuous Blood Gluc Sensor (FREESTYLE GABRIELLA 3 SENSOR) MISC Apply every 14 days for CGM 3/29/23  Yes [address], PA-C   pantoprazole (PROTONIX) 40 MG tablet Take 1 tablet by mouth every morning (before breakfast) 3/27/23  Yes Pravin James MD   Continuous Blood Gluc  (FREESTYLE GABRIELLA 14 DAY READER) FREDDY USE WITH SENSOR TO MONITOR BLOOD SUGAR CONTINUOUSLY 1/25/23  Yes [address], PA-C   pantoprazole (PROTONIX) 20 MG tablet TAKE ONE TABLET BY MOUTH EVERY MORNING BEFORE BREAKFAST 3/30/22  Yes [address], Lamar Shaffer [employer]. Devices MISC 1 PAIR OF DIABETIC SHOES (1 LEFT/ 1 RIGHT)  1-3 PAIRS OF INSERTS (LEFT/ RIGHT) 3/28/22  Yes Abram Servin DPM   Handicap Pranav Apple by Does not apply route Dx: Osteoarthritis knees, foot deformity    Exp: 3/23/3027 3/23/22  Yes [address], PA-C   acetaminophen (TYLENOL) 325 MG tablet Take 2 tablets by mouth every 6 hours as needed for Pain   Yes Historical Provider, MD   ibuprofen (ADVIL;MOTRIN) 200 MG tablet Take 1 tablet by mouth every 6 hours as needed for Pain   Yes Historical Provider, MD   metoprolol tartrate (LOPRESSOR) 25 MG tablet TAKE ONE TABLET BY MOUTH TWICE A DAY 7/31/23   [address], PA-C       Review of Systems    Review of Systems:  History obtained from chart review and the patient  General ROS: negative for - chills, fatigue, fever, night sweats or weight gain  Constitutional: Negative for chills, diaphoresis, fatigue, fever and unexpected weight change. Musculoskeletal: Positive for arthralgias, gait problem and joint swelling. Neurological ROS: negative for - behavioral changes, confusion, headaches or seizures. Negative for weakness and numbness. Dermatological ROS: negative for - mole changes, rash  Cardiovascular: Negative for leg swelling. Gastrointestinal: Negative for constipation, diarrhea, nausea and vomiting.          Lower

## 2023-07-31 DIAGNOSIS — I10 PRIMARY HYPERTENSION: ICD-10-CM

## 2023-08-02 ENCOUNTER — TELEPHONE (OUTPATIENT)
Dept: PRIMARY CARE CLINIC | Age: 63
End: 2023-08-02

## 2023-08-02 NOTE — TELEPHONE ENCOUNTER
Call made to the patient to confirm if the patient still has [facility] or if she has changed her insurance. Writer spoke with the patient and she informed the writer that she has changed to [facility], she states she does not have her card yet but was given the numbers and will update when she comes in to the appointment.

## 2023-08-08 ENCOUNTER — OFFICE VISIT (OUTPATIENT)
Dept: GASTROENTEROLOGY | Age: 63
End: 2023-08-08
Payer: COMMERCIAL

## 2023-08-08 VITALS
DIASTOLIC BLOOD PRESSURE: 71 MMHG | HEIGHT: 69 IN | HEART RATE: 65 BPM | WEIGHT: 276 LBS | BODY MASS INDEX: 40.88 KG/M2 | SYSTOLIC BLOOD PRESSURE: 115 MMHG

## 2023-08-08 DIAGNOSIS — Z80.0 FAMILY HISTORY OF COLON CANCER: ICD-10-CM

## 2023-08-08 DIAGNOSIS — A04.8 H. PYLORI INFECTION: Primary | ICD-10-CM

## 2023-08-08 DIAGNOSIS — K64.3 GRADE IV HEMORRHOIDS: ICD-10-CM

## 2023-08-08 DIAGNOSIS — H93.8X3 EAR FULLNESS, BILATERAL: ICD-10-CM

## 2023-08-08 DIAGNOSIS — K62.5 HEMORRHAGE OF RECTUM AND ANUS: ICD-10-CM

## 2023-08-08 DIAGNOSIS — Z87.19 HISTORY OF RECTAL POLYPS: ICD-10-CM

## 2023-08-08 PROCEDURE — 3074F SYST BP LT 130 MM HG: CPT | Performed by: INTERNAL MEDICINE

## 2023-08-08 PROCEDURE — G8417 CALC BMI ABV UP PARAM F/U: HCPCS | Performed by: INTERNAL MEDICINE

## 2023-08-08 PROCEDURE — 99214 OFFICE O/P EST MOD 30 MIN: CPT | Performed by: INTERNAL MEDICINE

## 2023-08-08 PROCEDURE — 1036F TOBACCO NON-USER: CPT | Performed by: INTERNAL MEDICINE

## 2023-08-08 PROCEDURE — 3078F DIAST BP <80 MM HG: CPT | Performed by: INTERNAL MEDICINE

## 2023-08-08 PROCEDURE — 3017F COLORECTAL CA SCREEN DOC REV: CPT | Performed by: INTERNAL MEDICINE

## 2023-08-08 PROCEDURE — G8427 DOCREV CUR MEDS BY ELIG CLIN: HCPCS | Performed by: INTERNAL MEDICINE

## 2023-08-08 RX ORDER — CETIRIZINE HYDROCHLORIDE 10 MG/1
TABLET ORAL
Qty: 30 TABLET | Refills: 0 | Status: SHIPPED | OUTPATIENT
Start: 2023-08-08

## 2023-08-08 ASSESSMENT — ENCOUNTER SYMPTOMS
RECTAL PAIN: 0
BLOOD IN STOOL: 1
ANAL BLEEDING: 0
SHORTNESS OF BREATH: 0
CHOKING: 0
TROUBLE SWALLOWING: 0
ABDOMINAL DISTENTION: 0
VOMITING: 0
COUGH: 0
WHEEZING: 0
NAUSEA: 0
DIARRHEA: 0
ABDOMINAL PAIN: 1
CONSTIPATION: 0

## 2023-08-08 NOTE — PROGRESS NOTES
Negative for difficulty urinating. Allergic/Immunologic: Negative for environmental allergies and food allergies. Neurological:  Negative for dizziness, weakness, light-headedness, numbness and headaches. Hematological:  Bruises/bleeds easily. Psychiatric/Behavioral:  Negative for sleep disturbance. The patient is not nervous/anxious. LABORATORY DATA: Reviewed  Lab Results   Component Value Date    WBC 6.3 04/04/2023    HGB 13.0 04/04/2023    HCT 40.3 04/04/2023    MCV 86.9 04/04/2023     04/04/2023     04/04/2023    K 3.8 04/04/2023     04/04/2023    CO2 26 04/04/2023    BUN 13 04/04/2023    CREATININE 0.86 04/04/2023    LABALBU 3.8 03/14/2023    BILITOT 1.1 03/14/2023    ALKPHOS 69 03/14/2023    AST 14 03/14/2023    ALT 12 03/14/2023    INR 1.0 03/12/2023         Lab Results   Component Value Date    RBC 4.64 04/04/2023    HGB 13.0 04/04/2023    MCV 86.9 04/04/2023    MCH 28.0 04/04/2023    MCHC 32.3 04/04/2023    RDW 13.9 04/04/2023    MPV 9.4 04/04/2023    BASOPCT 1 04/04/2023    LYMPHSABS 2.20 04/04/2023    MONOSABS 0.64 04/04/2023    NEUTROABS 3.13 04/04/2023    EOSABS 0.28 04/04/2023    BASOSABS 0.06 04/04/2023         DIAGNOSTIC TESTING:     No results found. PHYSICAL EXAMINATION: Vital signs reviewed per the nursing documentation. LMP 02/28/2015 (Approximate)   There is no height or weight on file to calculate BMI. Physical Exam  Vitals and nursing note reviewed. Constitutional:       General: She is not in acute distress. Appearance: She is well-developed. She is not diaphoretic. HENT:      Head: Normocephalic. Mouth/Throat:      Pharynx: No oropharyngeal exudate. Eyes:      General: No scleral icterus. Pupils: Pupils are equal, round, and reactive to light. Neck:      Thyroid: No thyromegaly. Vascular: No JVD. Trachea: No tracheal deviation. Cardiovascular:      Rate and Rhythm: Normal rate and regular rhythm.       Heart

## 2023-08-13 DIAGNOSIS — E11.9 TYPE 2 DIABETES MELLITUS WITHOUT COMPLICATION, WITHOUT LONG-TERM CURRENT USE OF INSULIN (HCC): ICD-10-CM

## 2023-08-14 RX ORDER — DULAGLUTIDE 3 MG/.5ML
INJECTION, SOLUTION SUBCUTANEOUS
Qty: 2 ML | Refills: 2 | Status: SHIPPED | OUTPATIENT
Start: 2023-08-14

## 2023-08-15 ENCOUNTER — OFFICE VISIT (OUTPATIENT)
Dept: PRIMARY CARE CLINIC | Age: 63
End: 2023-08-15
Payer: COMMERCIAL

## 2023-08-15 VITALS
OXYGEN SATURATION: 96 % | HEART RATE: 68 BPM | BODY MASS INDEX: 39.66 KG/M2 | SYSTOLIC BLOOD PRESSURE: 118 MMHG | WEIGHT: 267.8 LBS | RESPIRATION RATE: 16 BRPM | DIASTOLIC BLOOD PRESSURE: 66 MMHG | HEIGHT: 69 IN

## 2023-08-15 DIAGNOSIS — E11.9 TYPE 2 DIABETES MELLITUS WITHOUT COMPLICATION, WITHOUT LONG-TERM CURRENT USE OF INSULIN (HCC): ICD-10-CM

## 2023-08-15 DIAGNOSIS — Z01.818 PRE-OP EXAM: Primary | ICD-10-CM

## 2023-08-15 DIAGNOSIS — N95.0 POSTMENOPAUSAL BLEEDING: ICD-10-CM

## 2023-08-15 PROCEDURE — 2022F DILAT RTA XM EVC RTNOPTHY: CPT | Performed by: PHYSICIAN ASSISTANT

## 2023-08-15 PROCEDURE — 1036F TOBACCO NON-USER: CPT | Performed by: PHYSICIAN ASSISTANT

## 2023-08-15 PROCEDURE — 3017F COLORECTAL CA SCREEN DOC REV: CPT | Performed by: PHYSICIAN ASSISTANT

## 2023-08-15 PROCEDURE — 3078F DIAST BP <80 MM HG: CPT | Performed by: PHYSICIAN ASSISTANT

## 2023-08-15 PROCEDURE — G8427 DOCREV CUR MEDS BY ELIG CLIN: HCPCS | Performed by: PHYSICIAN ASSISTANT

## 2023-08-15 PROCEDURE — 3044F HG A1C LEVEL LT 7.0%: CPT | Performed by: PHYSICIAN ASSISTANT

## 2023-08-15 PROCEDURE — G8417 CALC BMI ABV UP PARAM F/U: HCPCS | Performed by: PHYSICIAN ASSISTANT

## 2023-08-15 PROCEDURE — 3074F SYST BP LT 130 MM HG: CPT | Performed by: PHYSICIAN ASSISTANT

## 2023-08-15 PROCEDURE — 99214 OFFICE O/P EST MOD 30 MIN: CPT | Performed by: PHYSICIAN ASSISTANT

## 2023-08-15 RX ORDER — BLOOD-GLUCOSE SENSOR
EACH MISCELLANEOUS
Qty: 2 EACH | Refills: 2 | Status: SHIPPED | OUTPATIENT
Start: 2023-08-15

## 2023-08-15 RX ORDER — SODIUM CHLORIDE, SODIUM LACTATE, POTASSIUM CHLORIDE, CALCIUM CHLORIDE 600; 310; 30; 20 MG/100ML; MG/100ML; MG/100ML; MG/100ML
INJECTION, SOLUTION INTRAVENOUS CONTINUOUS
Status: CANCELLED | OUTPATIENT
Start: 2023-08-15

## 2023-08-15 ASSESSMENT — PATIENT HEALTH QUESTIONNAIRE - PHQ9
2. FEELING DOWN, DEPRESSED OR HOPELESS: 0
1. LITTLE INTEREST OR PLEASURE IN DOING THINGS: 0
SUM OF ALL RESPONSES TO PHQ QUESTIONS 1-9: 0
SUM OF ALL RESPONSES TO PHQ9 QUESTIONS 1 & 2: 0
SUM OF ALL RESPONSES TO PHQ QUESTIONS 1-9: 0

## 2023-08-15 ASSESSMENT — ENCOUNTER SYMPTOMS
DIARRHEA: 0
ABDOMINAL PAIN: 0
RHINORRHEA: 0
CONSTIPATION: 0
NAUSEA: 0
COUGH: 0
VOMITING: 0
SHORTNESS OF BREATH: 0
SINUS PAIN: 0
BACK PAIN: 0

## 2023-08-15 NOTE — DISCHARGE INSTRUCTIONS
Preoperative Instructions:    Stop eating solid foods at midnight the night prior to surgery. Stop drinking clear liquids at midnight the night prior to surgery. Arrive at the surgery center (Entrance B) by 5:45-6:00 AM on 8/30/2023  (or as directed by your surgeon's office). If you have been given a blood band, you must bring it with you the day of surgery. Please stop any blood thinning medications as directed by your surgeon or prescribing physician. Failure to stop certain medications may interfere with your scheduled surgery. These may include:  Aspirin, Warfarin (Coumadin), Clopidogrel (Plavix), Ibuprofen (Motrin, Advil), Naproxen (Aleve), Meloxicam (Mobic), Celecoxib (Celebrex), Eliquis, Pradaxa, Xarelto, Effient, Fish Oil, Herbal supplements. Any weekly antidiabetic injections (Trulicity) must be stopped one week prior to surgery and plan discussed with prescribing provider. You may continue the rest of your medications through the night before surgery unless instructed otherwise. Please take only the following medication(s) the day of surgery with a small sip of water:  Metoprolol (Lopressor), Pantoprazole (Protonix)    Please use and bring inhalers the day of surgery. Please bring CPAP the day of surgery. PLEASE NOTE:  THE ABOVE (IF ANY) DISCONTINUED MEDS MAY ONLY BE FROM   \"CLEANING UP\" THE MED LIST AND WERE NOT ACTUALLY CANCELLED; SEE CHART FOR DETAILS AND ALWAYS CHECK WITH PRESCRIBING PROVIDER BEFORE DISCONTINUING ANY MEDICATIONS          ____________________________  ____________________________  Signature (Patient)           Signature/date(Provider)      REMINDERS:  ** If you are going home the day of your procedure, you will need a friend or family member to drive you home after your procedure. Your  must be 25years of age or older and able to sign off on your discharge instructions. Taxi cabs or any form of public transportation is not acceptable.     ** It is

## 2023-08-17 ENCOUNTER — HOSPITAL ENCOUNTER (OUTPATIENT)
Age: 63
Discharge: HOME | End: 2023-08-17
Attending: PODIATRIST
Payer: COMMERCIAL

## 2023-08-17 ENCOUNTER — TELEPHONE (OUTPATIENT)
Dept: OBGYN CLINIC | Age: 63
End: 2023-08-17

## 2023-08-17 ENCOUNTER — HOSPITAL ENCOUNTER (OUTPATIENT)
Dept: MRI IMAGING | Age: 63
Discharge: HOME | End: 2023-08-19
Attending: PODIATRIST
Payer: COMMERCIAL

## 2023-08-17 DIAGNOSIS — A04.8 H. PYLORI INFECTION: ICD-10-CM

## 2023-08-17 DIAGNOSIS — M84.372A STRESS FRACTURE, LEFT ANKLE, INITIAL ENCOUNTER FOR FRACTURE: ICD-10-CM

## 2023-08-17 PROCEDURE — 73721 MRI JNT OF LWR EXTRE W/O DYE: CPT

## 2023-08-17 PROCEDURE — 83013 H PYLORI (C-13) BREATH: CPT

## 2023-08-17 NOTE — TELEPHONE ENCOUNTER
Placed orders for CBC, T&S and Urine HCG (POCT).     Thanks,    DO Dorinda Wynn Ob/Gyn   8/17/2023, 3:44 PM

## 2023-08-17 NOTE — TELEPHONE ENCOUNTER
Sergey Thomas at [facility] called needing orders as this pt has her testing scheduled for Monday @10.

## 2023-08-21 ENCOUNTER — HOSPITAL ENCOUNTER (OUTPATIENT)
Dept: PREADMISSION TESTING | Age: 63
Discharge: HOME | End: 2023-08-25
Payer: COMMERCIAL

## 2023-08-21 VITALS
RESPIRATION RATE: 18 BRPM | TEMPERATURE: 98.1 F | OXYGEN SATURATION: 97 % | SYSTOLIC BLOOD PRESSURE: 126 MMHG | HEIGHT: 69 IN | DIASTOLIC BLOOD PRESSURE: 77 MMHG | WEIGHT: 267 LBS | BODY MASS INDEX: 39.55 KG/M2 | HEART RATE: 65 BPM

## 2023-08-21 LAB
ANION GAP SERPL CALCULATED.3IONS-SCNC: 13 MMOL/L (ref 9–17)
BASOPHILS # BLD: 0.06 K/UL (ref 0–0.2)
BASOPHILS NFR BLD: 1 % (ref 0–2)
BUN SERPL-MCNC: 15 MG/DL (ref 8–23)
CHLORIDE SERPL-SCNC: 105 MMOL/L (ref 98–107)
CO2 SERPL-SCNC: 25 MMOL/L (ref 20–31)
CREAT SERPL-MCNC: 0.9 MG/DL (ref 0.5–0.9)
EOSINOPHIL # BLD: 0.4 K/UL (ref 0–0.44)
EOSINOPHILS RELATIVE PERCENT: 7 % (ref 1–4)
ERYTHROCYTE [DISTWIDTH] IN BLOOD BY AUTOMATED COUNT: 13.9 % (ref 11.8–14.4)
GFR SERPL CREATININE-BSD FRML MDRD: >60 ML/MIN/1.73M2
GLUCOSE SERPL-MCNC: 94 MG/DL (ref 70–99)
HCG UR QL: NEGATIVE
HCT VFR BLD AUTO: 44.1 % (ref 36.3–47.1)
HGB BLD-MCNC: 14.1 G/DL (ref 11.9–15.1)
IMM GRANULOCYTES # BLD AUTO: <0.03 K/UL (ref 0–0.3)
IMM GRANULOCYTES NFR BLD: 0 %
LYMPHOCYTES NFR BLD: 2.28 K/UL (ref 1.1–3.7)
LYMPHOCYTES RELATIVE PERCENT: 38 % (ref 24–43)
MCH RBC QN AUTO: 28.2 PG (ref 25.2–33.5)
MCHC RBC AUTO-ENTMCNC: 32 G/DL (ref 28.4–34.8)
MCV RBC AUTO: 88.2 FL (ref 82.6–102.9)
MONOCYTES NFR BLD: 0.53 K/UL (ref 0.1–1.2)
MONOCYTES NFR BLD: 9 % (ref 3–12)
NEUTROPHILS NFR BLD: 45 % (ref 36–65)
NEUTS SEG NFR BLD: 2.79 K/UL (ref 1.5–8.1)
NRBC BLD-RTO: 0 PER 100 WBC
PLATELET # BLD AUTO: 280 K/UL (ref 138–453)
PMV BLD AUTO: 9.6 FL (ref 8.1–13.5)
POTASSIUM SERPL-SCNC: 4.1 MMOL/L (ref 3.7–5.3)
RBC # BLD AUTO: 5 M/UL (ref 3.95–5.11)
SODIUM SERPL-SCNC: 143 MMOL/L (ref 135–144)
WBC OTHER # BLD: 6.1 K/UL (ref 3.5–11.3)

## 2023-08-21 PROCEDURE — 86900 BLOOD TYPING SEROLOGIC ABO: CPT

## 2023-08-21 PROCEDURE — 93005 ELECTROCARDIOGRAM TRACING: CPT

## 2023-08-21 PROCEDURE — 36415 COLL VENOUS BLD VENIPUNCTURE: CPT

## 2023-08-21 PROCEDURE — 84520 ASSAY OF UREA NITROGEN: CPT

## 2023-08-21 PROCEDURE — 80051 ELECTROLYTE PANEL: CPT

## 2023-08-21 PROCEDURE — 82947 ASSAY GLUCOSE BLOOD QUANT: CPT

## 2023-08-21 PROCEDURE — 81025 URINE PREGNANCY TEST: CPT

## 2023-08-21 PROCEDURE — 85025 COMPLETE CBC W/AUTO DIFF WBC: CPT

## 2023-08-21 PROCEDURE — 86901 BLOOD TYPING SEROLOGIC RH(D): CPT

## 2023-08-21 PROCEDURE — 86850 RBC ANTIBODY SCREEN: CPT

## 2023-08-21 PROCEDURE — 82565 ASSAY OF CREATININE: CPT

## 2023-08-21 NOTE — PROGRESS NOTES
Anesthesia Focused Assessment  Upcoming surgery:   8/30/2023 DILATATION AND CURETTAGE HYSTEROSCOPY WITH  (AVETA )RESECTION Nancy Dior, DO     Hx of anesthesia complications:  awareness with a colonoscopy years ago, but states no issues with subsequent colonoscopy  Family hx of anesthesia complications:  no    METS functional capacity:   -Moderate/Excellent >4     + Covid-19 test in last 8 weeks? no  Symptoms/Hospitalization?:    STOP-BANG Sleep Apnea Questionnaire    SNORE loudly (heard through closed doors)? Yes  TIRED, fatigued, sleepy during daytime? No  OBSERVED stopping breathing during sleep? No  High blood PRESSURE or being treated? Yes    BMI over 35? Yes  AGE over 48? Yes  NECK circumference over 16\"? No  GENDER (male)? No             Total 4  High risk 5-8  Intermediate risk 3-4  Low risk 0-2    ----------------------------------------------------------------------------------------------------------------------  SARA:                                             no  If yes, machine?:                              Type 1 DM:                                   no  T2DM:                                           yes, on Trulicity- takes weekly on Mondays, has dose due today. I spoke with anesthesia (Dr. Devora Bridges). Advised patient hold 3/89 dose of Trulicity, ok to take today's dose. I called patient and informed her and she verbalized understanding. I also advised her to check in with PCP to inform them and for any further recommendations and that our office will do the same. Coronary Artery Disease:             no  Hypertension:                               yes  Defib / AICD / Pacemaker:           no    Preliminary EKG read today shows NSR, RBB, possible inferior infarct. Patient denies chest pains or any other cardiopulmonary complaints. Denies cardiac history. Renal Failure:                               no  If yes, on dialysis? :                           Active

## 2023-08-23 ENCOUNTER — TELEPHONE (OUTPATIENT)
Dept: PRIMARY CARE CLINIC | Age: 63
End: 2023-08-23

## 2023-08-23 LAB
EKG ATRIAL RATE: 68 BPM
EKG P AXIS: 63 DEGREES
EKG P-R INTERVAL: 208 MS
EKG Q-T INTERVAL: 424 MS
EKG QRS DURATION: 144 MS
EKG QTC CALCULATION (BAZETT): 450 MS
EKG R AXIS: 39 DEGREES
EKG T AXIS: 6 DEGREES
EKG VENTRICULAR RATE: 68 BPM

## 2023-08-23 NOTE — TELEPHONE ENCOUNTER
Jayda Coleman from [address] called to check on pre op clearance. Per Manda, CMA patient needs a cardiac clearance and he will be working on scheduling this. Jadya Coleman would like a call back to let her know when cardiology appt is scheduled. Call her at [phone] with appt info.

## 2023-08-24 NOTE — TELEPHONE ENCOUNTER
Call made to [facility] to schedule a cardiac clearance for the patient's scheduled surgery on 08/30/2023 and Chon Hernández was informed that their office will not be able to accommodate a new patient cardiac clearance for anything before August 30th. Please advise.

## 2023-08-25 ENCOUNTER — TELEPHONE (OUTPATIENT)
Dept: OBGYN CLINIC | Age: 63
End: 2023-08-25

## 2023-08-25 ENCOUNTER — TELEPHONE (OUTPATIENT)
Dept: PRIMARY CARE CLINIC | Age: 63
End: 2023-08-25

## 2023-08-25 LAB
ABO + RH BLD: NORMAL
ARM BAND NUMBER: NORMAL
BLOOD BANK SAMPLE EXPIRATION: NORMAL
BLOOD GROUP ANTIBODIES SERPL: NEGATIVE

## 2023-08-25 NOTE — TELEPHONE ENCOUNTER
Baljinder Reinoso from [address] called the office stating pt surgery scheduled for 8/30 has been cancelled and should be referred to [facility]. Baljinder Reinoso states pt can see [facility] or go to any where PCP recommends.  Please Advise

## 2023-08-25 NOTE — PROGRESS NOTES
Spoke with [facility] at Dr. Jim Bruno office regarding cardiac clearance requested by PCP. States that due to pt not being able to be seen prior to scheduled surgery on 8/30/2023, surgery will  be canceled. Notified PCP, Adrien Damon's office. Spoke with Clark Orozco. States that she will send note to Adrien Joaquin for referral to cardiology.

## 2023-08-25 NOTE — TELEPHONE ENCOUNTER
Heidy from [facility] pre-op calling into office, asked if patient has an appt with Denton marshall for cardiology clearance. Writer advised that medical assistant is off today, writer offered to call. Advised would call back with information. Writer called [facility] scheduling and spoke with Brittnee. She states that they do not have anything open until 9/8/23. Writer called Heidy back at [phone] and [facility] advising of appt availability. Advised to call the office back with any questions.     Please advise    Last Visit Date: 8/15/2023   Next Visit Date: 10/2/2023

## 2023-08-25 NOTE — TELEPHONE ENCOUNTER
Patient called to check on referral to [facility]. Patient was informed that once referral is placed someone from the office will call and inform her.

## 2023-08-25 NOTE — TELEPHONE ENCOUNTER
Received a phone call from Mallika from [address] Patient had an abnormal EKG and now needs cardiology clearance before having surgery with Dr. Jose C Wolfe. I called [facility] and canceled the surgery. I also, called and spoke with patient regarding the situation. Advised patient to give her PCP office a call regarding her EKG results. Explained to her that I was unable to give her those results since our doctor did not order it. Patient understood and was in agreement with plan.

## 2023-08-28 NOTE — TELEPHONE ENCOUNTER
Writer spoke with [facility], writer spoke with Won Ordonez in the scheduling department and asked if they would be able to schedule the patient for a New Patient Cardiac Clearance. Won Ordonez states that she will be contacting her supervisor to see where they would be able to get the patient scheduled before her surgery appointment on 08/30/2023. Medina with contact the patient to schedule an appointment.

## 2023-09-08 ENCOUNTER — TELEPHONE (OUTPATIENT)
Dept: OBGYN CLINIC | Age: 63
End: 2023-09-08

## 2023-09-08 NOTE — TELEPHONE ENCOUNTER
M for patient to call back for surgery date and time. Surgery date 9/13/23 @12:10pm    Arrival time for patient @10:00am    Patient does NOT have to repeat PAT testing. Nothing to eat or drink past midnight the night prior to surgery.

## 2023-09-10 DIAGNOSIS — A04.8 H. PYLORI INFECTION: ICD-10-CM

## 2023-09-10 DIAGNOSIS — H93.8X3 EAR FULLNESS, BILATERAL: ICD-10-CM

## 2023-09-11 ENCOUNTER — TELEPHONE (OUTPATIENT)
Dept: OBGYN CLINIC | Age: 63
End: 2023-09-11

## 2023-09-11 RX ORDER — CLARITHROMYCIN 500 MG/1
TABLET, COATED ORAL
Qty: 28 TABLET | Refills: 0 | OUTPATIENT
Start: 2023-09-11

## 2023-09-11 RX ORDER — CETIRIZINE HYDROCHLORIDE 10 MG/1
TABLET ORAL
Qty: 30 TABLET | Refills: 0 | Status: SHIPPED | OUTPATIENT
Start: 2023-09-11

## 2023-09-11 NOTE — TELEPHONE ENCOUNTER
Spoke with patient letting her know her surgery is not at 8:30am and she will need to arrive at 6:30am. Patient is in agreement with plan.

## 2023-09-12 ENCOUNTER — ANESTHESIA EVENT (OUTPATIENT)
Dept: OPERATING ROOM | Age: 63
End: 2023-09-12
Payer: COMMERCIAL

## 2023-09-12 NOTE — H&P
OB/GYN Pre-Op H&P  [address]    Patient Name: Jo Neville     Patient : 1960  Room/Bed: [address] [facility] [address] [facility]  Admission Date/Time: 2023  6:37 AM  Primary Care Physician: Latosha Vargas  MRN: [id number]    Date: 2023  Time: 8:04 AM    The patient was seen in pre-op holding. She is here for a Hysteroscopy dilation and curettage with Aveta Resection. Jo Neville 58 y.o. [id number] presents for surgical management of postmenopausal bleeding since 2023 with an US finding of endometrium thickening with a stripe of 5.3 mm. US also measured the uterus at 7x4. 9x3.6 cm. The patient has opted to proceed to the operating room for surgical management and sampling of the endometrium. The procedure risks and complications were reviewed. The labs, Consent, and H&P were reviewed and updated. The patient was counseled on the possibility of  the need of a second surgery. The patient voiced understanding and had all of her questions answered. The possibility of incomplete removal of abnormal tissue was discussed. OBSTETRICAL HISTORY:   OB History    Para Term  AB Living   4 4 4 0 0 0   SAB IAB Ectopic Molar Multiple Live Births   0 0 0 0 0 0      # Outcome Date GA Lbr Zafar/2nd Weight Sex Delivery Anes PTL Lv   4 Term            3 Term            2 Term            1 Term                PAST MEDICAL HISTORY:   has a past medical history of Abnormal uterine bleeding (AUB), Arthritis, Awareness under anesthesia, Diabetes mellitus ([address]), Family history of breast cancer, GERD (gastroesophageal reflux disease), Hayfever, Hyperlipidemia, Hyperplastic polyp of intestine, Hypertension, Metabolic syndrome, Obesity, Thickened endometrium, Under care of service provider, Under care of service provider, Under care of service provider, Under care of service provider, Under care of service provider, and Wears contact lenses.     PAST SURGICAL HISTORY:   has a past surgical this procedure may not alleviate her symptoms. That there may be a necessity for a second surgery and that there may be an incomplete removal of abnormal tissue. Trinity Farias MD  Ob/Gyn Resident   [address], [facility]  9/13/2023, 8:04 AM     Attending Physician Statement  I have discussed the care of Genora Kelin, including pertinent history and exam findings with the resident. I have reviewed and edited their note in the electronic medical record. The key elements of all parts of the encounter have been performed/reviewed by me . I agree with the assessment, plan and orders as documented by the resident. The level of care submitted represents to the best of my ability the care documented in the medical record today. GC Modifier. This service has been performed in part by a resident under the direction of a teaching physician.       Attending's Name:  Darian Sheriff DO

## 2023-09-13 ENCOUNTER — HOSPITAL ENCOUNTER (OUTPATIENT)
Age: 63
Setting detail: OUTPATIENT SURGERY
Discharge: HOME | End: 2023-09-13
Attending: STUDENT IN AN ORGANIZED HEALTH CARE EDUCATION/TRAINING PROGRAM | Admitting: STUDENT IN AN ORGANIZED HEALTH CARE EDUCATION/TRAINING PROGRAM
Payer: COMMERCIAL

## 2023-09-13 ENCOUNTER — ANESTHESIA (OUTPATIENT)
Dept: OPERATING ROOM | Age: 63
End: 2023-09-13
Payer: COMMERCIAL

## 2023-09-13 VITALS
OXYGEN SATURATION: 97 % | HEART RATE: 54 BPM | RESPIRATION RATE: 16 BRPM | WEIGHT: 267 LBS | TEMPERATURE: 96.8 F | DIASTOLIC BLOOD PRESSURE: 78 MMHG | HEIGHT: 69 IN | SYSTOLIC BLOOD PRESSURE: 127 MMHG | BODY MASS INDEX: 39.55 KG/M2

## 2023-09-13 DIAGNOSIS — R93.89 THICKENED ENDOMETRIUM: ICD-10-CM

## 2023-09-13 DIAGNOSIS — N95.0 POST-MENOPAUSAL BLEEDING: ICD-10-CM

## 2023-09-13 PROBLEM — N84.1 CERVICAL POLYP: Status: ACTIVE | Noted: 2023-09-13

## 2023-09-13 PROBLEM — N84.0 ENDOMETRIAL POLYP: Status: ACTIVE | Noted: 2023-09-13

## 2023-09-13 PROBLEM — Z98.890 STATUS POST HYSTEROSCOPIC POLYPECTOMY: Status: ACTIVE | Noted: 2023-09-13

## 2023-09-13 LAB
GLUCOSE BLD-MCNC: 101 MG/DL (ref 65–105)
GLUCOSE BLD-MCNC: 112 MG/DL (ref 65–105)

## 2023-09-13 PROCEDURE — 7100000001 HC PACU RECOVERY - ADDTL 15 MIN: Performed by: STUDENT IN AN ORGANIZED HEALTH CARE EDUCATION/TRAINING PROGRAM

## 2023-09-13 PROCEDURE — 2720000010 HC SURG SUPPLY STERILE: Performed by: STUDENT IN AN ORGANIZED HEALTH CARE EDUCATION/TRAINING PROGRAM

## 2023-09-13 PROCEDURE — 3600000003 HC SURGERY LEVEL 3 BASE: Performed by: STUDENT IN AN ORGANIZED HEALTH CARE EDUCATION/TRAINING PROGRAM

## 2023-09-13 PROCEDURE — 2500000003 HC RX 250 WO HCPCS

## 2023-09-13 PROCEDURE — 7100000011 HC PHASE II RECOVERY - ADDTL 15 MIN: Performed by: STUDENT IN AN ORGANIZED HEALTH CARE EDUCATION/TRAINING PROGRAM

## 2023-09-13 PROCEDURE — 6360000002 HC RX W HCPCS: Performed by: ANESTHESIOLOGY

## 2023-09-13 PROCEDURE — 82947 ASSAY GLUCOSE BLOOD QUANT: CPT

## 2023-09-13 PROCEDURE — 3700000001 HC ADD 15 MINUTES (ANESTHESIA): Performed by: STUDENT IN AN ORGANIZED HEALTH CARE EDUCATION/TRAINING PROGRAM

## 2023-09-13 PROCEDURE — 2580000003 HC RX 258: Performed by: STUDENT IN AN ORGANIZED HEALTH CARE EDUCATION/TRAINING PROGRAM

## 2023-09-13 PROCEDURE — 7100000000 HC PACU RECOVERY - FIRST 15 MIN: Performed by: STUDENT IN AN ORGANIZED HEALTH CARE EDUCATION/TRAINING PROGRAM

## 2023-09-13 PROCEDURE — 3600000013 HC SURGERY LEVEL 3 ADDTL 15MIN: Performed by: STUDENT IN AN ORGANIZED HEALTH CARE EDUCATION/TRAINING PROGRAM

## 2023-09-13 PROCEDURE — 6360000002 HC RX W HCPCS

## 2023-09-13 PROCEDURE — 2580000003 HC RX 258: Performed by: ANESTHESIOLOGY

## 2023-09-13 PROCEDURE — 3700000000 HC ANESTHESIA ATTENDED CARE: Performed by: STUDENT IN AN ORGANIZED HEALTH CARE EDUCATION/TRAINING PROGRAM

## 2023-09-13 PROCEDURE — 2709999900 HC NON-CHARGEABLE SUPPLY: Performed by: STUDENT IN AN ORGANIZED HEALTH CARE EDUCATION/TRAINING PROGRAM

## 2023-09-13 PROCEDURE — 58558 HYSTEROSCOPY BIOPSY: CPT | Performed by: STUDENT IN AN ORGANIZED HEALTH CARE EDUCATION/TRAINING PROGRAM

## 2023-09-13 PROCEDURE — 88305 TISSUE EXAM BY PATHOLOGIST: CPT

## 2023-09-13 PROCEDURE — 7100000010 HC PHASE II RECOVERY - FIRST 15 MIN: Performed by: STUDENT IN AN ORGANIZED HEALTH CARE EDUCATION/TRAINING PROGRAM

## 2023-09-13 RX ORDER — FENTANYL CITRATE 50 UG/ML
25 INJECTION, SOLUTION INTRAMUSCULAR; INTRAVENOUS EVERY 5 MIN PRN
Status: DISCONTINUED | OUTPATIENT
Start: 2023-09-13 | End: 2023-09-13 | Stop reason: HOSPADM

## 2023-09-13 RX ORDER — IBUPROFEN 200 MG
600 TABLET ORAL EVERY 6 HOURS PRN
Qty: 120 TABLET | Refills: 3 | Status: SHIPPED | OUTPATIENT
Start: 2023-09-13

## 2023-09-13 RX ORDER — ONDANSETRON 2 MG/ML
INJECTION INTRAMUSCULAR; INTRAVENOUS PRN
Status: DISCONTINUED | OUTPATIENT
Start: 2023-09-13 | End: 2023-09-13 | Stop reason: SDUPTHER

## 2023-09-13 RX ORDER — LIDOCAINE HYDROCHLORIDE 10 MG/ML
INJECTION, SOLUTION EPIDURAL; INFILTRATION; INTRACAUDAL; PERINEURAL PRN
Status: DISCONTINUED | OUTPATIENT
Start: 2023-09-13 | End: 2023-09-13 | Stop reason: SDUPTHER

## 2023-09-13 RX ORDER — SENNA AND DOCUSATE SODIUM 50; 8.6 MG/1; MG/1
1 TABLET, FILM COATED ORAL 2 TIMES DAILY
Qty: 60 TABLET | Refills: 0 | Status: SHIPPED | OUTPATIENT
Start: 2023-09-13

## 2023-09-13 RX ORDER — MAGNESIUM HYDROXIDE 1200 MG/15ML
LIQUID ORAL CONTINUOUS PRN
Status: DISCONTINUED | OUTPATIENT
Start: 2023-09-13 | End: 2023-09-13 | Stop reason: HOSPADM

## 2023-09-13 RX ORDER — FENTANYL CITRATE 50 UG/ML
50 INJECTION, SOLUTION INTRAMUSCULAR; INTRAVENOUS EVERY 5 MIN PRN
Status: COMPLETED | OUTPATIENT
Start: 2023-09-13 | End: 2023-09-13

## 2023-09-13 RX ORDER — SODIUM CHLORIDE 0.9 % (FLUSH) 0.9 %
5-40 SYRINGE (ML) INJECTION PRN
Status: DISCONTINUED | OUTPATIENT
Start: 2023-09-13 | End: 2023-09-13 | Stop reason: HOSPADM

## 2023-09-13 RX ORDER — FENTANYL CITRATE 50 UG/ML
INJECTION, SOLUTION INTRAMUSCULAR; INTRAVENOUS PRN
Status: DISCONTINUED | OUTPATIENT
Start: 2023-09-13 | End: 2023-09-13 | Stop reason: SDUPTHER

## 2023-09-13 RX ORDER — SODIUM CHLORIDE 0.9 % (FLUSH) 0.9 %
5-40 SYRINGE (ML) INJECTION EVERY 12 HOURS SCHEDULED
Status: DISCONTINUED | OUTPATIENT
Start: 2023-09-13 | End: 2023-09-13 | Stop reason: HOSPADM

## 2023-09-13 RX ORDER — SODIUM CHLORIDE, SODIUM LACTATE, POTASSIUM CHLORIDE, CALCIUM CHLORIDE 600; 310; 30; 20 MG/100ML; MG/100ML; MG/100ML; MG/100ML
INJECTION, SOLUTION INTRAVENOUS CONTINUOUS
Status: DISCONTINUED | OUTPATIENT
Start: 2023-09-13 | End: 2023-09-13 | Stop reason: HOSPADM

## 2023-09-13 RX ORDER — PROPOFOL 10 MG/ML
INJECTION, EMULSION INTRAVENOUS PRN
Status: DISCONTINUED | OUTPATIENT
Start: 2023-09-13 | End: 2023-09-13 | Stop reason: SDUPTHER

## 2023-09-13 RX ORDER — DEXAMETHASONE SODIUM PHOSPHATE 10 MG/ML
INJECTION INTRAMUSCULAR; INTRAVENOUS PRN
Status: DISCONTINUED | OUTPATIENT
Start: 2023-09-13 | End: 2023-09-13 | Stop reason: SDUPTHER

## 2023-09-13 RX ORDER — KETOROLAC TROMETHAMINE 30 MG/ML
INJECTION, SOLUTION INTRAMUSCULAR; INTRAVENOUS PRN
Status: DISCONTINUED | OUTPATIENT
Start: 2023-09-13 | End: 2023-09-13 | Stop reason: SDUPTHER

## 2023-09-13 RX ORDER — SODIUM CHLORIDE 9 MG/ML
INJECTION, SOLUTION INTRAVENOUS PRN
Status: DISCONTINUED | OUTPATIENT
Start: 2023-09-13 | End: 2023-09-13 | Stop reason: HOSPADM

## 2023-09-13 RX ADMIN — DEXAMETHASONE SODIUM PHOSPHATE 10 MG: 10 INJECTION INTRAMUSCULAR; INTRAVENOUS at 08:41

## 2023-09-13 RX ADMIN — KETOROLAC TROMETHAMINE 30 MG: 30 INJECTION, SOLUTION INTRAMUSCULAR; INTRAVENOUS at 09:01

## 2023-09-13 RX ADMIN — FENTANYL CITRATE 50 MCG: 50 INJECTION, SOLUTION INTRAMUSCULAR; INTRAVENOUS at 09:27

## 2023-09-13 RX ADMIN — FENTANYL CITRATE 50 MCG: 50 INJECTION, SOLUTION INTRAMUSCULAR; INTRAVENOUS at 09:40

## 2023-09-13 RX ADMIN — LIDOCAINE HYDROCHLORIDE 50 MG: 10 INJECTION, SOLUTION EPIDURAL; INFILTRATION; INTRACAUDAL; PERINEURAL at 08:26

## 2023-09-13 RX ADMIN — SODIUM CHLORIDE, POTASSIUM CHLORIDE, SODIUM LACTATE AND CALCIUM CHLORIDE: 600; 310; 30; 20 INJECTION, SOLUTION INTRAVENOUS at 07:15

## 2023-09-13 RX ADMIN — ONDANSETRON 4 MG: 2 INJECTION INTRAMUSCULAR; INTRAVENOUS at 09:01

## 2023-09-13 RX ADMIN — PROPOFOL 200 MG: 10 INJECTION, EMULSION INTRAVENOUS at 08:26

## 2023-09-13 RX ADMIN — FENTANYL CITRATE 100 MCG: 50 INJECTION, SOLUTION INTRAMUSCULAR; INTRAVENOUS at 08:26

## 2023-09-13 ASSESSMENT — PAIN DESCRIPTION - LOCATION
LOCATION: PERINEUM

## 2023-09-13 ASSESSMENT — PAIN SCALES - GENERAL
PAINLEVEL_OUTOF10: 8
PAINLEVEL_OUTOF10: 2
PAINLEVEL_OUTOF10: 5
PAINLEVEL_OUTOF10: 2
PAINLEVEL_OUTOF10: 7

## 2023-09-13 ASSESSMENT — PAIN DESCRIPTION - DESCRIPTORS
DESCRIPTORS: BURNING

## 2023-09-13 NOTE — OP NOTE
Operative Note  Department of Obstetrics and Gynecology  [facility]       Patient: Ana Edwards   : 1960  MRN: [id number]       Acct: [de-identified]   PCP: Lizet Woo PA-C  Date of Procedure: 23    Pre-operative Diagnosis: 58 y.o. female    Postmenopausal Bleeding   Thickened Endometrium    Hypertension   Type 2 Diabetes Mellitus  Hypercholesterolemia   Osteoarthritis   BMI 39    Post-operative Diagnosis:   Postmenopausal Bleeding   Thickened Endometrium    Endometrial polyp  Cervical polyp  Hypertension   Type 2 Diabetes Mellitus  Hypercholesterolemia   Osteoarthritis   BMI 39    Procedure: Hysteroscopy, Dilation and Curettage, Aveta Resection     Surgeon: Dr. Elena Eid  Assistants: Marina Leonard MD, PGY2    Anesthesia: general    Indications: Ana Edwards 58 y.o.  presents for surgical management of postmenopausal bleeding since 2023 with an US finding of endometrium thickening with a stripe of 5.3 mm. US also measured the uterus at 7x4. 9x3.6 cm. The patient has opted to proceed to the operating room for surgical management and sampling of the endometrium. Procedure Details: The patient was seen in the pre-op room. The risks, benefits, complications, treatment options, and expected outcomes were discussed with the patient. The patient concurred with the proposed plan, giving informed consent. The patient was taken to the Operating Room and identified as Ana Edwards and the procedure was verified. A Time Out was held and the above information confirmed. After administration of general anesthesia, the patient was placed in the dorsolithotomy position with yellow-fin stirrups and examination under general anesthesia performed with findings as noted below. The patient was prepped and draped in the usual sterile fashion. A weighted speculum was placed into the vagina to visualize the cervix. The cervix was grasped with an allis clamp.  The uterus and the cervix were sounded and measured 7 cm. The cervix was dilated with chong dilators to size 12. Aveta hysteroscope was inserted into the uterine cavity. Clamp was used to grasp cervical polyp and cervical polypectomy was performed without complication. The Aveta resection devise was inserted through the Aveta hysteroscope without complication. Endometrial polyp and hypertrophic tissue was removed with the Aveta resection devise without complication. Endometrial curettage was carried out with sharp curettage yielding curettings which were collected and sent for pathology examination. All instruments were then removed. Hemostasis was visualized at the clamp site on the cervix. Instrument, sponge, and needle counts were correct at the conclusion of the case. SCDs for DVT prophylaxis remain in place for the post operative period. Dr. Sancho Ulloa was present for the entire operation. Findings:  Uterus sounded to 7cm and was anteverted. Normal appearing external genitalia without lesions. Normal appearing vagina without lesions. Normal appearing cervix without lesion, with polyp protruding out of external cervical os approximately 3mm. Endocervical canal with polyp present and extending throughout the entirety of there cervix. Endometrial canal with one polyp with base on the left. Hypertrophic, fluffy endometrial tissue noted throughout endometrial canal. Bilateral tubal ostia visualized and normal appearing. Estimated Blood Loss: 10 ml  Drains:  None  Total IV Fluids: 1000ml  Urine output: n/a  Specimens:  Endometrial and cervical polyp with curetting's   Instrument and Sponge Count: Correct  Complications: none  Condition: stable, transfer to post anesthesia recovery    Lou Valladares MD  Ob/Gyn Resident  9/13/2023, 9:17 AM    Date: 9/13/2023  Time: 9:33 AM    Attending Attestation:   I was present and scrubbed for the entire procedure.     Attending Name: Steffen Pérez DO

## 2023-09-13 NOTE — ANESTHESIA POSTPROCEDURE EVALUATION
Department of Anesthesiology  Postprocedure Note    Patient: Dc Dunn  MRN: [id number]  YOB: 1960  Date of evaluation: 9/13/2023      Procedure Summary     Date: 09/13/23 Room / Location: [address]    Anesthesia Start: 0820 Anesthesia Stop: 2130    Procedure: DILATATION AND CURETTAGE HYSTEROSCOPY, AVETA RESECTION Diagnosis: Thickened endometrium      Post-menopausal bleeding      (Thickened endometrium [R93.89])      (Post-menopausal bleeding [N95.0])    Surgeons: Abhinav Lomeli DO Responsible Provider: Bette Tamez MD    Anesthesia Type: general ASA Status: 3          Anesthesia Type: No value filed.     Etienne Phase I: Etienne Score: 10    Etienne Phase II: Etienne Score: 10      Anesthesia Post Evaluation    Patient location during evaluation: PACU  Patient participation: complete - patient participated  Level of consciousness: awake and alert  Pain score: 2  Airway patency: patent  Nausea & Vomiting: no nausea and no vomiting  Complications: no  Cardiovascular status: hemodynamically stable  Respiratory status: acceptable  Hydration status: euvolemic  Pain management: adequate

## 2023-09-13 NOTE — PROGRESS NOTES
Discharge instructions given to  and patient, verbalized understanding. All belongings given to . Patient discharged via wheelchair in a stable condition accompanied by RN and  Vickey Manner.

## 2023-09-15 LAB — SURGICAL PATHOLOGY REPORT: NORMAL

## 2023-09-18 ENCOUNTER — OFFICE VISIT (OUTPATIENT)
Dept: PODIATRY | Age: 63
End: 2023-09-18
Payer: COMMERCIAL

## 2023-09-18 VITALS — WEIGHT: 267 LBS | BODY MASS INDEX: 39.55 KG/M2 | HEIGHT: 69 IN

## 2023-09-18 DIAGNOSIS — G89.29 CHRONIC PAIN OF LEFT ANKLE: ICD-10-CM

## 2023-09-18 DIAGNOSIS — M19.071 DEGENERATIVE JOINT DISEASE OF BOTH ANKLES AND FEET: ICD-10-CM

## 2023-09-18 DIAGNOSIS — M25.572 SINUS TARSI SYNDROME OF LEFT ANKLE: Primary | ICD-10-CM

## 2023-09-18 DIAGNOSIS — E11.9 TYPE 2 DIABETES MELLITUS WITHOUT COMPLICATION, WITHOUT LONG-TERM CURRENT USE OF INSULIN (HCC): ICD-10-CM

## 2023-09-18 DIAGNOSIS — M19.072 DEGENERATIVE JOINT DISEASE OF BOTH ANKLES AND FEET: ICD-10-CM

## 2023-09-18 DIAGNOSIS — M25.572 CHRONIC PAIN OF LEFT ANKLE: ICD-10-CM

## 2023-09-18 DIAGNOSIS — M25.572 ACUTE LEFT ANKLE PAIN: ICD-10-CM

## 2023-09-18 PROCEDURE — 3017F COLORECTAL CA SCREEN DOC REV: CPT | Performed by: PODIATRIST

## 2023-09-18 PROCEDURE — G8427 DOCREV CUR MEDS BY ELIG CLIN: HCPCS | Performed by: PODIATRIST

## 2023-09-18 PROCEDURE — 2022F DILAT RTA XM EVC RTNOPTHY: CPT | Performed by: PODIATRIST

## 2023-09-18 PROCEDURE — 99214 OFFICE O/P EST MOD 30 MIN: CPT | Performed by: PODIATRIST

## 2023-09-18 PROCEDURE — G8417 CALC BMI ABV UP PARAM F/U: HCPCS | Performed by: PODIATRIST

## 2023-09-18 PROCEDURE — 1036F TOBACCO NON-USER: CPT | Performed by: PODIATRIST

## 2023-09-18 PROCEDURE — 3044F HG A1C LEVEL LT 7.0%: CPT | Performed by: PODIATRIST

## 2023-09-18 NOTE — PROGRESS NOTES
[address] [address] [facility]  Dept: [phone]    RETURN PATIENT PROGRESS NOTE  Date of patient's visit: 9/18/2023  Patient's Name:  Marti Sevilla YOB: 1960            Patient Care Team:  Stephen Evangelista PA-C as PCP - General (Physician Assistant)  Stephen Evangelista [facility]-C [facility]  Melany Hua MD as Consulting Physician (Gastroenterology)       Marti Sevilla 58 y.o. female that presents for follow-up of   Chief Complaint   Patient presents with    Results     Discuss mri results    Ankle Pain     Left ankle pain     Pt's primary care physician is Maisha Rooney last seen august 15 2023  Symptoms began 6 month(s) ago and are decreased . Patient relates pain is Present. Pain is rated 4 out of 10 and is described as intermittent. Treatments prior to today's visit include: previous podiatry treatment, injection, mri. Currently denies F/C/N/V.  Patient is here to discuss her mri results    No Known Allergies    Past Medical History:   Diagnosis Date    Abnormal uterine bleeding (AUB)     Arthritis     Awareness under anesthesia     woke during colonoscopy    Diabetes mellitus ([address])     Family history of breast cancer     GERD (gastroesophageal reflux disease)     Hayfever     Hyperlipidemia     Hyperplastic polyp of intestine     Hypertension     Metabolic syndrome     Obesity     Thickened endometrium     Under care of service provider 08/21/2023    pcp-[facility]-last visit aug 2023    Under care of service provider 08/21/2023    podiatry-yudi-last visit july 2023    Under care of service provider 08/21/2023    [facility] july 2023    Under care of service provider 08/21/2023    gi-d[facility]-last visit aug 2023    Under care of service provider 08/21/2023    ob-[facility]-last visit aug 2023    Wears contact lenses        Prior to Admission

## 2023-09-26 DIAGNOSIS — A04.8 H. PYLORI INFECTION: ICD-10-CM

## 2023-09-26 RX ORDER — CLARITHROMYCIN 500 MG/1
TABLET, COATED ORAL
Qty: 28 TABLET | Refills: 0 | OUTPATIENT
Start: 2023-09-26

## 2023-09-27 DIAGNOSIS — A04.8 H. PYLORI INFECTION: ICD-10-CM

## 2023-09-27 RX ORDER — AMOXICILLIN 500 MG/1
CAPSULE ORAL
Qty: 56 CAPSULE | OUTPATIENT
Start: 2023-09-27

## 2023-09-27 RX ORDER — CLARITHROMYCIN 500 MG/1
TABLET, COATED ORAL
Qty: 28 TABLET | Refills: 0 | OUTPATIENT
Start: 2023-09-27

## 2023-09-27 RX ORDER — OMEPRAZOLE 20 MG/1
CAPSULE, DELAYED RELEASE ORAL
Qty: 56 CAPSULE | Refills: 0 | OUTPATIENT
Start: 2023-09-27

## 2023-09-28 DIAGNOSIS — K21.9 GASTROESOPHAGEAL REFLUX DISEASE, UNSPECIFIED WHETHER ESOPHAGITIS PRESENT: Primary | ICD-10-CM

## 2023-10-02 ENCOUNTER — OFFICE VISIT (OUTPATIENT)
Dept: PRIMARY CARE CLINIC | Age: 63
End: 2023-10-02
Payer: COMMERCIAL

## 2023-10-02 VITALS
HEIGHT: 69 IN | HEART RATE: 66 BPM | DIASTOLIC BLOOD PRESSURE: 64 MMHG | OXYGEN SATURATION: 98 % | WEIGHT: 262 LBS | BODY MASS INDEX: 38.8 KG/M2 | SYSTOLIC BLOOD PRESSURE: 118 MMHG | RESPIRATION RATE: 16 BRPM

## 2023-10-02 DIAGNOSIS — S69.91XA INJURY OF INDEX FINGER, RIGHT, INITIAL ENCOUNTER: ICD-10-CM

## 2023-10-02 DIAGNOSIS — E66.01 CLASS 2 SEVERE OBESITY WITH SERIOUS COMORBIDITY AND BODY MASS INDEX (BMI) OF 38.0 TO 38.9 IN ADULT, UNSPECIFIED OBESITY TYPE (HCC): ICD-10-CM

## 2023-10-02 DIAGNOSIS — I10 PRIMARY HYPERTENSION: ICD-10-CM

## 2023-10-02 DIAGNOSIS — E11.9 TYPE 2 DIABETES MELLITUS WITHOUT COMPLICATION, WITHOUT LONG-TERM CURRENT USE OF INSULIN (HCC): Primary | ICD-10-CM

## 2023-10-02 PROBLEM — R94.31 ABNORMAL ECG: Status: ACTIVE | Noted: 2023-09-01

## 2023-10-02 PROBLEM — E66.812 CLASS 2 SEVERE OBESITY WITH SERIOUS COMORBIDITY AND BODY MASS INDEX (BMI) OF 38.0 TO 38.9 IN ADULT: Status: ACTIVE | Noted: 2023-10-02

## 2023-10-02 PROBLEM — I45.10 RBBB: Status: ACTIVE | Noted: 2023-09-01

## 2023-10-02 LAB — HBA1C MFR BLD: 5.4 %

## 2023-10-02 PROCEDURE — G8417 CALC BMI ABV UP PARAM F/U: HCPCS | Performed by: PHYSICIAN ASSISTANT

## 2023-10-02 PROCEDURE — 2022F DILAT RTA XM EVC RTNOPTHY: CPT | Performed by: PHYSICIAN ASSISTANT

## 2023-10-02 PROCEDURE — 99214 OFFICE O/P EST MOD 30 MIN: CPT | Performed by: PHYSICIAN ASSISTANT

## 2023-10-02 PROCEDURE — 3078F DIAST BP <80 MM HG: CPT | Performed by: PHYSICIAN ASSISTANT

## 2023-10-02 PROCEDURE — 3044F HG A1C LEVEL LT 7.0%: CPT | Performed by: PHYSICIAN ASSISTANT

## 2023-10-02 PROCEDURE — 3074F SYST BP LT 130 MM HG: CPT | Performed by: PHYSICIAN ASSISTANT

## 2023-10-02 PROCEDURE — G8427 DOCREV CUR MEDS BY ELIG CLIN: HCPCS | Performed by: PHYSICIAN ASSISTANT

## 2023-10-02 PROCEDURE — G8484 FLU IMMUNIZE NO ADMIN: HCPCS | Performed by: PHYSICIAN ASSISTANT

## 2023-10-02 PROCEDURE — 3017F COLORECTAL CA SCREEN DOC REV: CPT | Performed by: PHYSICIAN ASSISTANT

## 2023-10-02 PROCEDURE — 83036 HEMOGLOBIN GLYCOSYLATED A1C: CPT | Performed by: PHYSICIAN ASSISTANT

## 2023-10-02 PROCEDURE — 1036F TOBACCO NON-USER: CPT | Performed by: PHYSICIAN ASSISTANT

## 2023-10-02 RX ORDER — FLASH GLUCOSE SENSOR
KIT MISCELLANEOUS
Qty: 2 EACH | Refills: 3 | Status: SHIPPED | OUTPATIENT
Start: 2023-10-02

## 2023-10-02 RX ORDER — NICOTINE POLACRILEX 4 MG/1
20 GUM, CHEWING ORAL
COMMUNITY

## 2023-10-02 ASSESSMENT — PATIENT HEALTH QUESTIONNAIRE - PHQ9
SUM OF ALL RESPONSES TO PHQ9 QUESTIONS 1 & 2: 0
SUM OF ALL RESPONSES TO PHQ QUESTIONS 1-9: 0
SUM OF ALL RESPONSES TO PHQ QUESTIONS 1-9: 0
1. LITTLE INTEREST OR PLEASURE IN DOING THINGS: 0
2. FEELING DOWN, DEPRESSED OR HOPELESS: 0
SUM OF ALL RESPONSES TO PHQ QUESTIONS 1-9: 0
SUM OF ALL RESPONSES TO PHQ QUESTIONS 1-9: 0

## 2023-10-03 RX ORDER — PANTOPRAZOLE SODIUM 40 MG/1
40 TABLET, DELAYED RELEASE ORAL
Qty: 90 TABLET | Refills: 3 | Status: SHIPPED | OUTPATIENT
Start: 2023-10-03

## 2023-10-03 ASSESSMENT — ENCOUNTER SYMPTOMS
CONSTIPATION: 0
DIARRHEA: 0
RHINORRHEA: 0
ABDOMINAL PAIN: 0
NAUSEA: 0
SHORTNESS OF BREATH: 0
SINUS PAIN: 0
VOMITING: 0
COUGH: 0
BACK PAIN: 0

## 2023-10-04 ENCOUNTER — TELEPHONE (OUTPATIENT)
Dept: PRIMARY CARE CLINIC | Age: 63
End: 2023-10-04

## 2023-10-13 DIAGNOSIS — E11.9 TYPE 2 DIABETES MELLITUS WITHOUT COMPLICATION, WITHOUT LONG-TERM CURRENT USE OF INSULIN (HCC): ICD-10-CM

## 2023-10-13 DIAGNOSIS — A04.8 H. PYLORI INFECTION: ICD-10-CM

## 2023-10-13 DIAGNOSIS — H93.8X3 EAR FULLNESS, BILATERAL: ICD-10-CM

## 2023-10-13 RX ORDER — DULAGLUTIDE 3 MG/.5ML
INJECTION, SOLUTION SUBCUTANEOUS
Qty: 2 ML | Refills: 2 | Status: SHIPPED | OUTPATIENT
Start: 2023-10-13

## 2023-10-13 RX ORDER — OMEPRAZOLE 20 MG/1
CAPSULE, DELAYED RELEASE ORAL
Qty: 56 CAPSULE | Refills: 0 | OUTPATIENT
Start: 2023-10-13

## 2023-10-13 RX ORDER — DOCUSATE SODIUM 50 MG AND SENNOSIDES 8.6 MG 8.6; 5 MG/1; MG/1
TABLET, FILM COATED ORAL
Qty: 60 TABLET | Refills: 0 | OUTPATIENT
Start: 2023-10-13

## 2023-10-13 RX ORDER — BLOOD-GLUCOSE SENSOR
EACH MISCELLANEOUS
Qty: 2 EACH | Refills: 2 | Status: SHIPPED | OUTPATIENT
Start: 2023-10-13

## 2023-10-13 RX ORDER — CLARITHROMYCIN 500 MG/1
TABLET, COATED ORAL
Qty: 28 TABLET | Refills: 0 | OUTPATIENT
Start: 2023-10-13

## 2023-10-13 RX ORDER — CETIRIZINE HYDROCHLORIDE 10 MG/1
TABLET ORAL
Qty: 30 TABLET | Refills: 0 | Status: SHIPPED | OUTPATIENT
Start: 2023-10-13

## 2023-10-13 RX ORDER — AMOXICILLIN 500 MG/1
CAPSULE ORAL
Qty: 56 CAPSULE | OUTPATIENT
Start: 2023-10-13

## 2023-11-27 ENCOUNTER — HOSPITAL ENCOUNTER (OUTPATIENT)
Dept: MRI IMAGING | Age: 63
Discharge: HOME | End: 2023-11-29
Attending: INTERNAL MEDICINE
Payer: COMMERCIAL

## 2023-11-27 DIAGNOSIS — Z87.42 HISTORY OF POSTMENOPAUSAL BLEEDING: ICD-10-CM

## 2023-11-27 DIAGNOSIS — Z91.89 AT HIGH RISK FOR BREAST CANCER: ICD-10-CM

## 2023-11-27 LAB
CREAT SERPL-MCNC: 0.8 MG/DL (ref 0.5–0.9)
GFR SERPL CREATININE-BSD FRML MDRD: >60 ML/MIN/1.73M2

## 2023-11-27 PROCEDURE — C8908 MRI W/O FOL W/CONT, BREAST,: HCPCS

## 2023-11-27 PROCEDURE — 36415 COLL VENOUS BLD VENIPUNCTURE: CPT

## 2023-11-27 PROCEDURE — 6360000004 HC RX CONTRAST MEDICATION: Performed by: INTERNAL MEDICINE

## 2023-11-27 PROCEDURE — 2580000003 HC RX 258: Performed by: INTERNAL MEDICINE

## 2023-11-27 PROCEDURE — 82565 ASSAY OF CREATININE: CPT

## 2023-11-27 PROCEDURE — A9579 GAD-BASE MR CONTRAST NOS,1ML: HCPCS | Performed by: INTERNAL MEDICINE

## 2023-11-27 RX ORDER — SODIUM CHLORIDE 0.9 % (FLUSH) 0.9 %
10 SYRINGE (ML) INJECTION ONCE
Status: COMPLETED | OUTPATIENT
Start: 2023-11-27 | End: 2023-11-27

## 2023-11-27 RX ORDER — 0.9 % SODIUM CHLORIDE 0.9 %
50 INTRAVENOUS SOLUTION INTRAVENOUS ONCE
Status: COMPLETED | OUTPATIENT
Start: 2023-11-27 | End: 2023-11-27

## 2023-11-27 RX ADMIN — SODIUM CHLORIDE 50 ML: 9 INJECTION, SOLUTION INTRAVENOUS at 11:41

## 2023-11-27 RX ADMIN — SODIUM CHLORIDE, PRESERVATIVE FREE 10 ML: 5 INJECTION INTRAVENOUS at 11:41

## 2023-11-27 RX ADMIN — GADOTERIDOL 20 ML: 279.3 INJECTION, SOLUTION INTRAVENOUS at 11:40

## 2023-12-04 ENCOUNTER — OFFICE VISIT (OUTPATIENT)
Dept: ONCOLOGY | Age: 63
End: 2023-12-04
Payer: COMMERCIAL

## 2023-12-04 ENCOUNTER — TELEPHONE (OUTPATIENT)
Dept: ONCOLOGY | Age: 63
End: 2023-12-04

## 2023-12-04 VITALS
BODY MASS INDEX: 40.68 KG/M2 | TEMPERATURE: 98.3 F | HEART RATE: 67 BPM | DIASTOLIC BLOOD PRESSURE: 67 MMHG | WEIGHT: 275.5 LBS | RESPIRATION RATE: 16 BRPM | SYSTOLIC BLOOD PRESSURE: 116 MMHG

## 2023-12-04 DIAGNOSIS — Z87.42 HISTORY OF POSTMENOPAUSAL BLEEDING: ICD-10-CM

## 2023-12-04 DIAGNOSIS — E66.01 MORBID OBESITY WITH BMI OF 40.0-44.9, ADULT (HCC): ICD-10-CM

## 2023-12-04 DIAGNOSIS — Z91.89 AT HIGH RISK FOR BREAST CANCER: Primary | ICD-10-CM

## 2023-12-04 PROCEDURE — G8417 CALC BMI ABV UP PARAM F/U: HCPCS | Performed by: INTERNAL MEDICINE

## 2023-12-04 PROCEDURE — 1036F TOBACCO NON-USER: CPT | Performed by: INTERNAL MEDICINE

## 2023-12-04 PROCEDURE — G8427 DOCREV CUR MEDS BY ELIG CLIN: HCPCS | Performed by: INTERNAL MEDICINE

## 2023-12-04 PROCEDURE — 3074F SYST BP LT 130 MM HG: CPT | Performed by: INTERNAL MEDICINE

## 2023-12-04 PROCEDURE — 3017F COLORECTAL CA SCREEN DOC REV: CPT | Performed by: INTERNAL MEDICINE

## 2023-12-04 PROCEDURE — 99214 OFFICE O/P EST MOD 30 MIN: CPT | Performed by: INTERNAL MEDICINE

## 2023-12-04 PROCEDURE — G8484 FLU IMMUNIZE NO ADMIN: HCPCS | Performed by: INTERNAL MEDICINE

## 2023-12-04 PROCEDURE — 3078F DIAST BP <80 MM HG: CPT | Performed by: INTERNAL MEDICINE

## 2023-12-04 RX ORDER — TAMOXIFEN CITRATE 20 MG/1
20 TABLET ORAL DAILY
Qty: 90 TABLET | Refills: 0 | Status: SHIPPED | OUTPATIENT
Start: 2023-12-04 | End: 2024-03-03

## 2023-12-04 NOTE — TELEPHONE ENCOUNTER
Sandrita Parker MD VISIT  Rv in 3 months with labs   LABS CDP CMP 3/4/24  MD VISIT 3/4/24 @ 11:15AM  AVS PRINTED W/ INSTRUCTIONS AND GIVEN  TO PT ON EXIT

## 2023-12-04 NOTE — PROGRESS NOTES
Chief Complaint   Patient presents with    Follow-up     Breast hurt occasionally     Results     MRI    Discussed treatment plan. Reviewed results of MRI breast    INTERVAL HISTORY:  Patient presents to the clinic for a follow-up visit and to discuss further treatment plan. Since last office visit underwent gynecological evaluation as well as biopsy of the uterus. No evidence of malignancy. Denies any more vaginal bleeding. MRI breast came back as BI-RADS 1    During this visit patient's allergy, social, medical, surgical history and medications were reviewed and updated. BRIEF CASE HISTORY: Patient is a very pleasant 58 y.o. female who is referred to us for consultation for the high risk  breast cancer clinic. She is evaluated by myself and the genetic counselor. She does not have any personal history of breast cancer but she has significant family history. She underwent screening mammogram and during the screen, she took the [employer] high risk questionnaire. The questionnaire showed that her lifetime risk of breast cancer is 26.6%  which falls into the high risk category. She is referred to us for counseling, discussion of risk reduction modalities as well as genetic evaluation. Patient also has history of post menstrual bleeding. Previously she has had ultrasound of pelvis done which showed fibroids. Lately she has not followed up with a gynecologist.  At present she does not have any post menstrual bleeding. GYN history     Breast density average, menarche 14years of age, first live birth 28 years, menopause 53 years, hysterectomy 20, breast biopsy 2/10/2022 came back as fibroadenoma. Last mammogram January 2022.     PAST MEDICAL HISTORY:  has a past medical history of Abnormal uterine bleeding (AUB), Arthritis, Awareness under anesthesia, Diabetes mellitus ([address]), Family history of breast cancer, GERD (gastroesophageal reflux disease), Hayfever, Hyperlipidemia, Hyperplastic polyp

## 2023-12-06 DIAGNOSIS — H93.8X3 EAR FULLNESS, BILATERAL: ICD-10-CM

## 2023-12-06 DIAGNOSIS — E11.9 TYPE 2 DIABETES MELLITUS WITHOUT COMPLICATION, WITHOUT LONG-TERM CURRENT USE OF INSULIN (HCC): ICD-10-CM

## 2023-12-06 RX ORDER — DOCUSATE SODIUM 50 MG AND SENNOSIDES 8.6 MG 8.6; 5 MG/1; MG/1
TABLET, FILM COATED ORAL
Qty: 60 TABLET | Refills: 0 | OUTPATIENT
Start: 2023-12-06

## 2023-12-06 RX ORDER — CETIRIZINE HYDROCHLORIDE 10 MG/1
TABLET ORAL
Qty: 30 TABLET | Refills: 0 | Status: SHIPPED | OUTPATIENT
Start: 2023-12-06

## 2023-12-06 RX ORDER — BLOOD-GLUCOSE SENSOR
EACH MISCELLANEOUS
Qty: 2 EACH | Refills: 2 | Status: SHIPPED | OUTPATIENT
Start: 2023-12-06

## 2024-02-01 DIAGNOSIS — I10 PRIMARY HYPERTENSION: ICD-10-CM

## 2024-02-01 DIAGNOSIS — H93.8X3 EAR FULLNESS, BILATERAL: ICD-10-CM

## 2024-02-01 RX ORDER — CETIRIZINE HYDROCHLORIDE 10 MG/1
TABLET ORAL
Qty: 30 TABLET | Refills: 0 | Status: SHIPPED | OUTPATIENT
Start: 2024-02-01

## 2024-02-01 RX ORDER — DOCUSATE SODIUM 50 MG AND SENNOSIDES 8.6 MG 8.6; 5 MG/1; MG/1
TABLET, FILM COATED ORAL
Qty: 60 TABLET | Refills: 0 | OUTPATIENT
Start: 2024-02-01

## 2024-02-05 RX ORDER — DOCUSATE SODIUM 50 MG AND SENNOSIDES 8.6 MG 8.6; 5 MG/1; MG/1
TABLET, FILM COATED ORAL
Qty: 60 TABLET | Refills: 0 | Status: SHIPPED | OUTPATIENT
Start: 2024-02-05

## 2024-03-04 ENCOUNTER — OFFICE VISIT (OUTPATIENT)
Dept: ONCOLOGY | Age: 64
End: 2024-03-04
Payer: MEDICAID

## 2024-03-04 ENCOUNTER — HOSPITAL ENCOUNTER (OUTPATIENT)
Age: 64
Setting detail: SPECIMEN
Discharge: HOME | End: 2024-03-04
Payer: MEDICAID

## 2024-03-04 ENCOUNTER — TELEPHONE (OUTPATIENT)
Dept: ONCOLOGY | Age: 64
End: 2024-03-04

## 2024-03-04 VITALS
TEMPERATURE: 97.7 F | BODY MASS INDEX: 41.11 KG/M2 | HEART RATE: 72 BPM | WEIGHT: 278.4 LBS | SYSTOLIC BLOOD PRESSURE: 129 MMHG | DIASTOLIC BLOOD PRESSURE: 78 MMHG | RESPIRATION RATE: 16 BRPM

## 2024-03-04 DIAGNOSIS — E66.01 MORBID OBESITY WITH BMI OF 40.0-44.9, ADULT (HCC): ICD-10-CM

## 2024-03-04 DIAGNOSIS — Z87.42 HISTORY OF POSTMENOPAUSAL BLEEDING: ICD-10-CM

## 2024-03-04 DIAGNOSIS — Z91.89 AT HIGH RISK FOR BREAST CANCER: Primary | ICD-10-CM

## 2024-03-04 DIAGNOSIS — Z79.810 LONG-TERM CURRENT USE OF TAMOXIFEN: ICD-10-CM

## 2024-03-04 DIAGNOSIS — Z91.89 AT HIGH RISK FOR BREAST CANCER: ICD-10-CM

## 2024-03-04 LAB
ALBUMIN SERPL-MCNC: 3.9 G/DL (ref 3.5–5.2)
ALP SERPL-CCNC: 71 U/L (ref 35–104)
ALT SERPL-CCNC: 10 U/L (ref 5–33)
ANION GAP SERPL CALCULATED.3IONS-SCNC: 7 MMOL/L (ref 9–17)
AST SERPL-CCNC: 13 U/L
BASOPHILS # BLD: 0.07 K/UL (ref 0–0.2)
BASOPHILS NFR BLD: 1 % (ref 0–2)
BILIRUB SERPL-MCNC: 0.8 MG/DL (ref 0.3–1.2)
BUN SERPL-MCNC: 16 MG/DL (ref 8–23)
BUN/CREAT SERPL: 18 (ref 9–20)
CALCIUM SERPL-MCNC: 9.3 MG/DL (ref 8.6–10.4)
CO2 SERPL-SCNC: 29 MMOL/L (ref 20–31)
CREAT SERPL-MCNC: 0.9 MG/DL (ref 0.5–0.9)
EOSINOPHIL # BLD: 0.24 K/UL (ref 0–0.44)
EOSINOPHILS RELATIVE PERCENT: 4 % (ref 1–4)
ERYTHROCYTE [DISTWIDTH] IN BLOOD BY AUTOMATED COUNT: 14.1 % (ref 11.8–14.4)
GFR SERPL CREATININE-BSD FRML MDRD: >60 ML/MIN/1.73M2
GLUCOSE SERPL-MCNC: 93 MG/DL (ref 70–99)
HCT VFR BLD AUTO: 40 % (ref 36.3–47.1)
HGB BLD-MCNC: 12.9 G/DL (ref 11.9–15.1)
IMM GRANULOCYTES # BLD AUTO: 0.02 K/UL (ref 0–0.3)
IMM GRANULOCYTES NFR BLD: 0 %
LYMPHOCYTES NFR BLD: 2.51 K/UL (ref 1.1–3.7)
LYMPHOCYTES RELATIVE PERCENT: 38 % (ref 24–43)
MCH RBC QN AUTO: 28.2 PG (ref 25.2–33.5)
MCHC RBC AUTO-ENTMCNC: 32.3 G/DL (ref 28.4–34.8)
MCV RBC AUTO: 87.3 FL (ref 82.6–102.9)
MONOCYTES NFR BLD: 0.65 K/UL (ref 0.1–1.2)
MONOCYTES NFR BLD: 10 % (ref 3–12)
NEUTROPHILS NFR BLD: 47 % (ref 36–65)
NEUTS SEG NFR BLD: 3.17 K/UL (ref 1.5–8.1)
NRBC BLD-RTO: 0 PER 100 WBC
PLATELET # BLD AUTO: 252 K/UL (ref 138–453)
PMV BLD AUTO: 9.2 FL (ref 8.1–13.5)
POTASSIUM SERPL-SCNC: 4.1 MMOL/L (ref 3.7–5.3)
PROT SERPL-MCNC: 7.1 G/DL (ref 6.4–8.3)
RBC # BLD AUTO: 4.58 M/UL (ref 3.95–5.11)
SODIUM SERPL-SCNC: 141 MMOL/L (ref 135–144)

## 2024-03-04 PROCEDURE — 1123F ACP DISCUSS/DSCN MKR DOCD: CPT | Performed by: INTERNAL MEDICINE

## 2024-03-04 PROCEDURE — 99211 OFF/OP EST MAY X REQ PHY/QHP: CPT | Performed by: INTERNAL MEDICINE

## 2024-03-04 PROCEDURE — 3078F DIAST BP <80 MM HG: CPT | Performed by: INTERNAL MEDICINE

## 2024-03-04 PROCEDURE — 36415 COLL VENOUS BLD VENIPUNCTURE: CPT

## 2024-03-04 PROCEDURE — 3074F SYST BP LT 130 MM HG: CPT | Performed by: INTERNAL MEDICINE

## 2024-03-04 PROCEDURE — 85025 COMPLETE CBC W/AUTO DIFF WBC: CPT

## 2024-03-04 PROCEDURE — 99214 OFFICE O/P EST MOD 30 MIN: CPT | Performed by: INTERNAL MEDICINE

## 2024-03-04 PROCEDURE — 80053 COMPREHEN METABOLIC PANEL: CPT

## 2024-03-04 RX ORDER — TAMOXIFEN CITRATE 20 MG/1
20 TABLET ORAL DAILY
Qty: 90 TABLET | Refills: 1 | Status: SHIPPED | OUTPATIENT
Start: 2024-03-04 | End: 2024-08-31

## 2024-03-04 NOTE — TELEPHONE ENCOUNTER
NITA HERE FOR MD VISIT  Mamogram in may 2024  Rv in 3 months   MAMMOGRAM PT WILL CALL AND HAVE IT SCHEDULED  MD VISIT 5/20/24 @ 11:15AM  AVS PRINTED W/ INSTRUCTIONS AND GIVEN  TO PT ON EXIT

## 2024-03-04 NOTE — PROGRESS NOTES
Chief Complaint   Patient presents with    Follow-up   Toxicity check    INTERVAL HISTORY:  Patient presents to the clinic for a follow-up visit and to discuss further treatment plan.  Patient continues to be on tamoxifen without any problems.  Denies any more vaginal bleeding.  Denies noticing any mass or lump in the breast.  During this visit patient's allergy, social, medical, surgical history and medications were reviewed and updated.    BRIEF CASE HISTORY: Patient is a very pleasant 64 y.o. female who is referred to us for consultation for the high risk  breast cancer clinic. She is evaluated by myself and the genetic counselor.  She does not have any personal history of breast cancer but she has significant family history.  She underwent screening mammogram and during the screen, she [facility].  The questionnaire showed that her lifetime risk of breast cancer is 26.6%  which falls into the high risk category.  She is referred to us for counseling, discussion of risk reduction modalities as well as genetic evaluation.      Patient also has history of post menstrual bleeding.  Previously she has had ultrasound of pelvis done which showed fibroids.  Lately she has not followed up with a gynecologist.  At present she does not have any post menstrual bleeding.    GYN history     Breast density average, menarche 11 years of age, first live birth 27 years, menopause 54 years, hysterectomy 19, breast biopsy 2/10/2022 came back as fibroadenoma.  Last mammogram January 2022.    PAST MEDICAL HISTORY:  has a past medical history of Abnormal uterine bleeding (AUB), Arthritis, Awareness under anesthesia, Diabetes mellitus (HCC), Family history of breast cancer, GERD (gastroesophageal reflux disease), Hayfever, Hyperlipidemia, Hyperplastic polyp of intestine, Hypertension, Metabolic syndrome, Obesity, Thickened endometrium, Under care of service provider, Under care of service

## 2024-03-20 DIAGNOSIS — H93.8X3 EAR FULLNESS, BILATERAL: ICD-10-CM

## 2024-03-20 RX ORDER — CETIRIZINE HYDROCHLORIDE 10 MG/1
TABLET ORAL
Qty: 30 TABLET | Refills: 0 | Status: SHIPPED | OUTPATIENT
Start: 2024-03-20

## 2024-03-20 RX ORDER — FLUTICASONE PROPIONATE 50 MCG
SPRAY, SUSPENSION (ML) NASAL
Qty: 1 EACH | Refills: 2 | Status: SHIPPED | OUTPATIENT
Start: 2024-03-20

## 2024-03-25 ENCOUNTER — TELEPHONE (OUTPATIENT)
Dept: ADMINISTRATIVE | Age: 64
End: 2024-03-25

## 2024-03-25 DIAGNOSIS — K21.9 GASTROESOPHAGEAL REFLUX DISEASE, UNSPECIFIED WHETHER ESOPHAGITIS PRESENT: ICD-10-CM

## 2024-03-25 RX ORDER — PANTOPRAZOLE SODIUM 40 MG/1
40 TABLET, DELAYED RELEASE ORAL
Qty: 90 TABLET | Refills: 1 | Status: SHIPPED | OUTPATIENT
Start: 2024-03-25

## 2024-03-25 NOTE — TELEPHONE ENCOUNTER
Patient was last seen 8/8/2023 had appointments scheduled in February and March of 2023 but patient cancelled. She is requesting a refill on the following medications: pantoprazole (PROTONIX) 40 MG tablet pharmacy is still [facility].    IS COMPLETELY OUT OF THIS MEDICATION.

## 2024-04-13 DIAGNOSIS — M19.071 DEGENERATIVE JOINT DISEASE OF BOTH ANKLES AND FEET: ICD-10-CM

## 2024-04-13 DIAGNOSIS — H93.8X3 EAR FULLNESS, BILATERAL: ICD-10-CM

## 2024-04-13 DIAGNOSIS — M25.572 SINUS TARSI SYNDROME OF LEFT ANKLE: ICD-10-CM

## 2024-04-13 DIAGNOSIS — M19.072 DEGENERATIVE JOINT DISEASE OF BOTH ANKLES AND FEET: ICD-10-CM

## 2024-04-13 DIAGNOSIS — E11.9 TYPE 2 DIABETES MELLITUS WITHOUT COMPLICATION, WITHOUT LONG-TERM CURRENT USE OF INSULIN (HCC): ICD-10-CM

## 2024-04-15 RX ORDER — MELOXICAM 15 MG/1
15 TABLET ORAL DAILY
Qty: 30 TABLET | Refills: 3 | Status: SHIPPED | OUTPATIENT
Start: 2024-04-15

## 2024-04-15 RX ORDER — DULAGLUTIDE 3 MG/.5ML
INJECTION, SOLUTION SUBCUTANEOUS
Qty: 2 ML | Refills: 2 | Status: SHIPPED | OUTPATIENT
Start: 2024-04-15

## 2024-04-15 RX ORDER — CETIRIZINE HYDROCHLORIDE 10 MG/1
TABLET ORAL
Qty: 30 TABLET | Refills: 0 | Status: SHIPPED | OUTPATIENT
Start: 2024-04-15

## 2024-05-13 ENCOUNTER — HOSPITAL ENCOUNTER (OUTPATIENT)
Dept: MAMMOGRAPHY | Age: 64
Discharge: HOME | End: 2024-05-15
Attending: INTERNAL MEDICINE

## 2024-05-13 DIAGNOSIS — Z79.810 LONG-TERM CURRENT USE OF TAMOXIFEN: ICD-10-CM

## 2024-05-13 DIAGNOSIS — Z91.89 AT HIGH RISK FOR BREAST CANCER: ICD-10-CM

## 2024-05-15 DIAGNOSIS — H93.8X3 EAR FULLNESS, BILATERAL: ICD-10-CM
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
## 2024-05-16 RX ORDER — IBUPROFEN 200 MG
600 TABLET ORAL EVERY 6 HOURS PRN
Qty: 120 TABLET | Refills: 3 | OUTPATIENT
Start: 2024-05-16

## 2024-05-16 RX ORDER — CETIRIZINE HYDROCHLORIDE 10 MG/1
TABLET ORAL
Qty: 30 TABLET | Refills: 0 | Status: SHIPPED | OUTPATIENT
Start: 2024-05-16

## 2024-05-17 DIAGNOSIS — Z91.89 AT HIGH RISK FOR BREAST CANCER: Primary | ICD-10-CM

## 2024-06-04 DIAGNOSIS — I10 PRIMARY HYPERTENSION: ICD-10-CM

## 2024-06-09 DIAGNOSIS — H93.8X3 EAR FULLNESS, BILATERAL: ICD-10-CM

## 2024-06-10 RX ORDER — IBUPROFEN 200 MG
600 TABLET ORAL EVERY 6 HOURS PRN
Qty: 120 TABLET | Refills: 3 | OUTPATIENT
Start: 2024-06-10

## 2024-06-10 RX ORDER — CETIRIZINE HYDROCHLORIDE 10 MG/1
TABLET ORAL
Qty: 30 TABLET | Refills: 0 | Status: SHIPPED | OUTPATIENT
Start: 2024-06-10

## 2024-06-17 ENCOUNTER — HOSPITAL ENCOUNTER (OUTPATIENT)
Dept: ULTRASOUND IMAGING | Age: 64
Discharge: HOME | End: 2024-06-19
Attending: INTERNAL MEDICINE
Payer: COMMERCIAL

## 2024-06-17 ENCOUNTER — HOSPITAL ENCOUNTER (OUTPATIENT)
Dept: MAMMOGRAPHY | Age: 64
Discharge: HOME | End: 2024-06-19
Attending: INTERNAL MEDICINE
Payer: COMMERCIAL

## 2024-06-17 VITALS — HEIGHT: 69 IN | BODY MASS INDEX: 41.18 KG/M2 | WEIGHT: 278 LBS

## 2024-06-17 DIAGNOSIS — Z91.89 AT HIGH RISK FOR BREAST CANCER: ICD-10-CM

## 2024-06-17 DIAGNOSIS — N63.0 MASS OF BREAST, UNSPECIFIED LATERALITY: ICD-10-CM

## 2024-06-17 PROCEDURE — G0279 TOMOSYNTHESIS, MAMMO: HCPCS

## 2024-07-18 DIAGNOSIS — I10 PRIMARY HYPERTENSION: ICD-10-CM

## 2024-07-18 DIAGNOSIS — H93.8X3 EAR FULLNESS, BILATERAL: ICD-10-CM

## 2024-07-18 DIAGNOSIS — E11.9 TYPE 2 DIABETES MELLITUS WITHOUT COMPLICATION, WITHOUT LONG-TERM CURRENT USE OF INSULIN (HCC): ICD-10-CM

## 2024-07-18 RX ORDER — IBUPROFEN 200 MG
600 TABLET ORAL EVERY 6 HOURS PRN
Qty: 120 TABLET | Refills: 3 | OUTPATIENT
Start: 2024-07-18

## 2024-07-18 RX ORDER — BLOOD-GLUCOSE SENSOR
EACH MISCELLANEOUS
Qty: 2 EACH | Refills: 2 | Status: SHIPPED | OUTPATIENT
Start: 2024-07-18

## 2024-07-18 RX ORDER — FLUTICASONE PROPIONATE 50 MCG
SPRAY, SUSPENSION (ML) NASAL
Qty: 1 EACH | Refills: 1 | Status: SHIPPED | OUTPATIENT
Start: 2024-07-18

## 2024-07-22 DIAGNOSIS — H93.8X3 EAR FULLNESS, BILATERAL: ICD-10-CM

## 2024-07-22 RX ORDER — CETIRIZINE HYDROCHLORIDE 10 MG/1
10 TABLET ORAL DAILY
Qty: 30 TABLET | Refills: 0 | Status: SHIPPED | OUTPATIENT
Start: 2024-07-22

## 2024-08-18 DIAGNOSIS — E11.9 TYPE 2 DIABETES MELLITUS WITHOUT COMPLICATION, WITHOUT LONG-TERM CURRENT USE OF INSULIN (HCC): ICD-10-CM

## 2024-08-18 DIAGNOSIS — H93.8X3 EAR FULLNESS, BILATERAL: ICD-10-CM

## 2024-08-19 RX ORDER — FLASH GLUCOSE SENSOR
KIT MISCELLANEOUS
Qty: 2 EACH | Refills: 2 | Status: SHIPPED | OUTPATIENT
Start: 2024-08-19

## 2024-08-19 RX ORDER — CETIRIZINE HYDROCHLORIDE 10 MG/1
10 TABLET ORAL DAILY
Qty: 30 TABLET | Refills: 0 | Status: SHIPPED | OUTPATIENT
Start: 2024-08-19

## 2024-08-19 RX ORDER — DULAGLUTIDE 3 MG/.5ML
INJECTION, SOLUTION SUBCUTANEOUS
Qty: 2 ML | Refills: 2 | Status: SHIPPED | OUTPATIENT
Start: 2024-08-19

## 2024-08-20 RX ORDER — IBUPROFEN 200 MG
600 TABLET ORAL EVERY 6 HOURS PRN
Qty: 120 TABLET | Refills: 3 | OUTPATIENT
Start: 2024-08-20

## 2024-08-27 ENCOUNTER — OFFICE VISIT (OUTPATIENT)
Dept: GASTROENTEROLOGY | Age: 64
End: 2024-08-27
Payer: COMMERCIAL

## 2024-08-27 VITALS
TEMPERATURE: 98.2 F | WEIGHT: 276 LBS | DIASTOLIC BLOOD PRESSURE: 73 MMHG | OXYGEN SATURATION: 96 % | SYSTOLIC BLOOD PRESSURE: 114 MMHG | BODY MASS INDEX: 40.76 KG/M2 | HEART RATE: 64 BPM | RESPIRATION RATE: 16 BRPM

## 2024-08-27 DIAGNOSIS — K21.9 GASTROESOPHAGEAL REFLUX DISEASE, UNSPECIFIED WHETHER ESOPHAGITIS PRESENT: ICD-10-CM

## 2024-08-27 DIAGNOSIS — K62.5 HEMORRHAGE OF RECTUM AND ANUS: ICD-10-CM

## 2024-08-27 DIAGNOSIS — K64.3 GRADE IV HEMORRHOIDS: Primary | ICD-10-CM

## 2024-08-27 DIAGNOSIS — A04.8 H. PYLORI INFECTION: ICD-10-CM

## 2024-08-27 DIAGNOSIS — Z87.19 HISTORY OF RECTAL POLYPS: ICD-10-CM

## 2024-08-27 PROCEDURE — 3017F COLORECTAL CA SCREEN DOC REV: CPT | Performed by: INTERNAL MEDICINE

## 2024-08-27 PROCEDURE — 1036F TOBACCO NON-USER: CPT | Performed by: INTERNAL MEDICINE

## 2024-08-27 PROCEDURE — 99214 OFFICE O/P EST MOD 30 MIN: CPT | Performed by: INTERNAL MEDICINE

## 2024-08-27 PROCEDURE — 1090F PRES/ABSN URINE INCON ASSESS: CPT | Performed by: INTERNAL MEDICINE

## 2024-08-27 PROCEDURE — 3074F SYST BP LT 130 MM HG: CPT | Performed by: INTERNAL MEDICINE

## 2024-08-27 PROCEDURE — 1123F ACP DISCUSS/DSCN MKR DOCD: CPT | Performed by: INTERNAL MEDICINE

## 2024-08-27 PROCEDURE — G8417 CALC BMI ABV UP PARAM F/U: HCPCS | Performed by: INTERNAL MEDICINE

## 2024-08-27 PROCEDURE — G8400 PT W/DXA NO RESULTS DOC: HCPCS | Performed by: INTERNAL MEDICINE

## 2024-08-27 PROCEDURE — 3078F DIAST BP <80 MM HG: CPT | Performed by: INTERNAL MEDICINE

## 2024-08-27 PROCEDURE — G8427 DOCREV CUR MEDS BY ELIG CLIN: HCPCS | Performed by: INTERNAL MEDICINE

## 2024-08-27 ASSESSMENT — ENCOUNTER SYMPTOMS
COUGH: 0
ABDOMINAL DISTENTION: 0
CONSTIPATION: 0
VOMITING: 0
BLOOD IN STOOL: 0
VOICE CHANGE: 0
CHOKING: 0
TROUBLE SWALLOWING: 0
ABDOMINAL PAIN: 0
NAUSEA: 0
DIARRHEA: 0
WHEEZING: 0
RECTAL PAIN: 0
SORE THROAT: 0
ANAL BLEEDING: 0

## 2024-08-27 NOTE — PROGRESS NOTES
medications            Diet/life style/natural hx /complication of the dx were all explained in details   Past medical, past surgical, social history, psychiatric history, medications or allergies, all reviewed and  updated    Thank you for allowing me to participate in the care of Ms. Domínguez. For any further questions please do not hesitate to contact me.    I have reviewed and agree with the ROS entered by the MA/RN.     Note is dictated utilizing voice recognition software. Unfortunately this leads to occasional typographical errors. Please contact our office if you have any questions.  This note is created with the assistance of the speech recognition program. While intending to generate a document that actually reflects the content of the visit, it can still have some errors including those of syntax and sound-a-like substitutions which may escape proof reading. Actual meaning can be extrapolated by contextual inference.    Pravin James MD  [facility]  O: #[phone]

## 2024-09-23 DIAGNOSIS — H93.8X3 EAR FULLNESS, BILATERAL: ICD-10-CM

## 2024-09-23 RX ORDER — CETIRIZINE HYDROCHLORIDE 10 MG/1
10 TABLET ORAL DAILY
Qty: 30 TABLET | Refills: 0 | Status: SHIPPED | OUTPATIENT
Start: 2024-09-23

## 2024-10-24 DIAGNOSIS — E66.01 MORBID OBESITY WITH BMI OF 40.0-44.9, ADULT: ICD-10-CM

## 2024-10-24 DIAGNOSIS — Z91.89 AT HIGH RISK FOR BREAST CANCER: ICD-10-CM

## 2024-10-24 DIAGNOSIS — E11.9 TYPE 2 DIABETES MELLITUS WITHOUT COMPLICATION, WITHOUT LONG-TERM CURRENT USE OF INSULIN (HCC): ICD-10-CM

## 2024-10-24 DIAGNOSIS — Z87.42 HISTORY OF POSTMENOPAUSAL BLEEDING: ICD-10-CM

## 2024-10-24 DIAGNOSIS — I10 PRIMARY HYPERTENSION: ICD-10-CM

## 2024-10-24 DIAGNOSIS — H93.8X3 EAR FULLNESS, BILATERAL: ICD-10-CM

## 2024-10-24 DIAGNOSIS — Z79.810 LONG-TERM CURRENT USE OF TAMOXIFEN: ICD-10-CM

## 2024-10-25 RX ORDER — TAMOXIFEN CITRATE 20 MG/1
20 TABLET ORAL DAILY
Qty: 30 TABLET | Refills: 2 | Status: SHIPPED | OUTPATIENT
Start: 2024-10-25 | End: 2025-04-23

## 2024-10-25 RX ORDER — CETIRIZINE HYDROCHLORIDE 10 MG/1
10 TABLET ORAL DAILY
Qty: 30 TABLET | Refills: 0 | Status: SHIPPED | OUTPATIENT
Start: 2024-10-25

## 2024-10-25 RX ORDER — BLOOD-GLUCOSE SENSOR
EACH MISCELLANEOUS
Qty: 2 EACH | Refills: 2 | Status: SHIPPED | OUTPATIENT
Start: 2024-10-25

## 2024-10-25 RX ORDER — METOPROLOL TARTRATE 25 MG/1
TABLET, FILM COATED ORAL
Qty: 180 TABLET | Refills: 0 | Status: SHIPPED | OUTPATIENT
Start: 2024-10-25

## 2024-11-25 ENCOUNTER — OFFICE VISIT (OUTPATIENT)
Dept: PODIATRY | Age: 64
End: 2024-11-25
Payer: COMMERCIAL

## 2024-11-25 VITALS — HEIGHT: 69 IN | BODY MASS INDEX: 40.88 KG/M2 | WEIGHT: 276 LBS

## 2024-11-25 DIAGNOSIS — S86.111A RUPTURE OF RIGHT POSTERIOR TIBIALIS TENDON, INITIAL ENCOUNTER: Primary | ICD-10-CM

## 2024-11-25 DIAGNOSIS — M25.571 ACUTE RIGHT ANKLE PAIN: ICD-10-CM

## 2024-11-25 DIAGNOSIS — M25.571 SINUS TARSI SYNDROME OF RIGHT ANKLE: ICD-10-CM

## 2024-11-25 PROCEDURE — 3017F COLORECTAL CA SCREEN DOC REV: CPT | Performed by: PODIATRIST

## 2024-11-25 PROCEDURE — G8484 FLU IMMUNIZE NO ADMIN: HCPCS | Performed by: PODIATRIST

## 2024-11-25 PROCEDURE — 99214 OFFICE O/P EST MOD 30 MIN: CPT | Performed by: PODIATRIST

## 2024-11-25 PROCEDURE — 1090F PRES/ABSN URINE INCON ASSESS: CPT | Performed by: PODIATRIST

## 2024-11-25 PROCEDURE — G8427 DOCREV CUR MEDS BY ELIG CLIN: HCPCS | Performed by: PODIATRIST

## 2024-11-25 PROCEDURE — 1036F TOBACCO NON-USER: CPT | Performed by: PODIATRIST

## 2024-11-25 PROCEDURE — 1123F ACP DISCUSS/DSCN MKR DOCD: CPT | Performed by: PODIATRIST

## 2024-11-25 PROCEDURE — G8417 CALC BMI ABV UP PARAM F/U: HCPCS | Performed by: PODIATRIST

## 2024-11-25 PROCEDURE — G8400 PT W/DXA NO RESULTS DOC: HCPCS | Performed by: PODIATRIST

## 2024-11-25 RX ORDER — TRAMADOL HYDROCHLORIDE 50 MG/1
50 TABLET ORAL EVERY 4 HOURS PRN
Qty: 42 TABLET | Refills: 0 | Status: SHIPPED | OUTPATIENT
Start: 2024-11-25 | End: 2024-12-02

## 2024-12-02 NOTE — PROGRESS NOTES
or as a result of injury.    Symptoms  Sinus tarsi syndrome commonly leads to pain over the outside of the back of the foot. Swelling over the hollow between the ankle bone and the heel bone can develop. The swelling can enlarge so that it can be mistaken for a cyst or tumor.     Diagnosis  This syndrome is usually diagnosed by an exam by a Foot and ankle Podiatric Surgeon.     Your surgeon will see swelling over the outside of the joint below the ankle and tenderness over a specific area of the foot. X-rays can be helpful in diagnosis. On X-rays, your doctor may see collapse of the arch or arthritis.     Treatments  There are non-surgical and surgical treatment options available. In most cases, your doctor will attempt non-surgical treatments first. Anti-inflammatory medications may decrease the swelling in the sinus tarsi. A steroid injection may be tried if other medicines do not relieve the pain. An arch support can be used to relieve the pinching of the subtalar joint. A brace can be applied to the ankle and back of the foot to support and rest the subtalar joint.  Surgical treatments vary depending on the cause of the sinus tarsi pain. Options include removal of inflammation and scarring of the sinus tarsi. This can be done in an open or arthroscopic technique.    If a flatfoot is the cause of the sinus tarsi pain, your surgeon may recommend correction of the flatfoot. If the subtalar joint has advanced arthritis, your doctor may recommend a subtalar fusion (arthrodesis).     verbal and written instructions given to patient. Contact office with any questions/problems/concerns.     Orders Placed This Encounter   Procedures    MRI ANKLE RIGHT WO CONTRAST     Standing Status:   Future     Standing Expiration Date:   11/26/2025     Orders Placed This Encounter   Medications    traMADol (ULTRAM) 50 MG tablet     Sig: Take 1 tablet by mouth every 4 hours as needed for Pain for up to 7 days. Intended supply: 7 days.

## 2025-01-05 DIAGNOSIS — I10 PRIMARY HYPERTENSION: ICD-10-CM

## 2025-01-07 RX ORDER — METOPROLOL TARTRATE 25 MG/1
TABLET, FILM COATED ORAL
Qty: 180 TABLET | Refills: 0 | Status: SHIPPED | OUTPATIENT
Start: 2025-01-07

## 2025-01-28 DIAGNOSIS — M25.571 SINUS TARSI SYNDROME OF RIGHT ANKLE: ICD-10-CM

## 2025-01-28 DIAGNOSIS — Z87.42 HISTORY OF POSTMENOPAUSAL BLEEDING: ICD-10-CM

## 2025-01-28 DIAGNOSIS — H93.8X3 EAR FULLNESS, BILATERAL: ICD-10-CM

## 2025-01-28 DIAGNOSIS — M25.571 ACUTE RIGHT ANKLE PAIN: ICD-10-CM

## 2025-01-28 DIAGNOSIS — K21.9 GASTROESOPHAGEAL REFLUX DISEASE, UNSPECIFIED WHETHER ESOPHAGITIS PRESENT: ICD-10-CM

## 2025-01-28 DIAGNOSIS — E11.9 TYPE 2 DIABETES MELLITUS WITHOUT COMPLICATION, WITHOUT LONG-TERM CURRENT USE OF INSULIN (HCC): ICD-10-CM

## 2025-01-28 DIAGNOSIS — Z79.810 LONG-TERM CURRENT USE OF TAMOXIFEN: ICD-10-CM

## 2025-01-28 DIAGNOSIS — E66.01 MORBID OBESITY WITH BMI OF 40.0-44.9, ADULT: ICD-10-CM

## 2025-01-28 DIAGNOSIS — S86.111A RUPTURE OF RIGHT POSTERIOR TIBIALIS TENDON, INITIAL ENCOUNTER: ICD-10-CM

## 2025-01-28 DIAGNOSIS — Z91.89 AT HIGH RISK FOR BREAST CANCER: ICD-10-CM

## 2025-01-28 RX ORDER — CETIRIZINE HYDROCHLORIDE 10 MG/1
10 TABLET ORAL DAILY
Qty: 30 TABLET | Refills: 2 | Status: SHIPPED | OUTPATIENT
Start: 2025-01-28

## 2025-01-28 RX ORDER — DULAGLUTIDE 3 MG/.5ML
INJECTION, SOLUTION SUBCUTANEOUS
Qty: 2 ML | Refills: 2 | Status: SHIPPED | OUTPATIENT
Start: 2025-01-28

## 2025-01-28 RX ORDER — TAMOXIFEN CITRATE 20 MG/1
20 TABLET ORAL DAILY
Qty: 30 TABLET | Refills: 2 | Status: SHIPPED | OUTPATIENT
Start: 2025-01-28 | End: 2025-07-27

## 2025-01-29 RX ORDER — PANTOPRAZOLE SODIUM 40 MG/1
40 TABLET, DELAYED RELEASE ORAL
Qty: 90 TABLET | Refills: 1 | Status: SHIPPED | OUTPATIENT
Start: 2025-01-29

## 2025-01-30 RX ORDER — TRAMADOL HYDROCHLORIDE 50 MG/1
TABLET ORAL
Qty: 42 TABLET | OUTPATIENT
Start: 2025-01-30

## 2025-03-08 DIAGNOSIS — M25.571 ACUTE RIGHT ANKLE PAIN: ICD-10-CM

## 2025-03-08 DIAGNOSIS — S86.111A RUPTURE OF RIGHT POSTERIOR TIBIALIS TENDON, INITIAL ENCOUNTER: ICD-10-CM

## 2025-03-08 DIAGNOSIS — M25.571 SINUS TARSI SYNDROME OF RIGHT ANKLE: ICD-10-CM

## 2025-03-09 DIAGNOSIS — E11.9 TYPE 2 DIABETES MELLITUS WITHOUT COMPLICATION, WITHOUT LONG-TERM CURRENT USE OF INSULIN (HCC): ICD-10-CM

## 2025-03-10 RX ORDER — TRAMADOL HYDROCHLORIDE 50 MG/1
TABLET ORAL
Qty: 42 TABLET | OUTPATIENT
Start: 2025-03-10

## 2025-03-10 RX ORDER — FLASH GLUCOSE SENSOR
KIT MISCELLANEOUS
Qty: 2 EACH | Refills: 2 | Status: SHIPPED | OUTPATIENT
Start: 2025-03-10

## 2025-03-11 ENCOUNTER — HOSPITAL ENCOUNTER (OUTPATIENT)
Dept: MRI IMAGING | Age: 65
Discharge: HOME | End: 2025-03-13
Attending: PODIATRIST
Payer: COMMERCIAL

## 2025-03-11 DIAGNOSIS — S86.111A RUPTURE OF RIGHT POSTERIOR TIBIALIS TENDON, INITIAL ENCOUNTER: ICD-10-CM

## 2025-03-11 DIAGNOSIS — M25.571 ACUTE RIGHT ANKLE PAIN: ICD-10-CM

## 2025-03-11 DIAGNOSIS — M25.571 SINUS TARSI SYNDROME OF RIGHT ANKLE: ICD-10-CM

## 2025-03-11 PROCEDURE — 73721 MRI JNT OF LWR EXTRE W/O DYE: CPT

## 2025-04-13 DIAGNOSIS — M19.071 DEGENERATIVE JOINT DISEASE OF BOTH ANKLES AND FEET: ICD-10-CM

## 2025-04-13 DIAGNOSIS — M25.572 SINUS TARSI SYNDROME OF LEFT ANKLE: ICD-10-CM

## 2025-04-13 DIAGNOSIS — M19.072 DEGENERATIVE JOINT DISEASE OF BOTH ANKLES AND FEET: ICD-10-CM

## 2025-04-13 DIAGNOSIS — E11.9 TYPE 2 DIABETES MELLITUS WITHOUT COMPLICATION, WITHOUT LONG-TERM CURRENT USE OF INSULIN: ICD-10-CM

## 2025-04-13 DIAGNOSIS — S86.111A RUPTURE OF RIGHT POSTERIOR TIBIALIS TENDON, INITIAL ENCOUNTER: ICD-10-CM

## 2025-04-13 DIAGNOSIS — M25.571 ACUTE RIGHT ANKLE PAIN: ICD-10-CM

## 2025-04-13 DIAGNOSIS — M25.571 SINUS TARSI SYNDROME OF RIGHT ANKLE: ICD-10-CM

## 2025-04-13 DIAGNOSIS — I10 PRIMARY HYPERTENSION: ICD-10-CM

## 2025-04-14 RX ORDER — MELOXICAM 15 MG/1
15 TABLET ORAL DAILY
Qty: 30 TABLET | Refills: 3 | OUTPATIENT
Start: 2025-04-14

## 2025-04-14 RX ORDER — ACYCLOVIR 800 MG/1
TABLET ORAL
Qty: 2 EACH | Refills: 2 | Status: SHIPPED | OUTPATIENT
Start: 2025-04-14

## 2025-04-14 RX ORDER — METOPROLOL TARTRATE 25 MG/1
25 TABLET, FILM COATED ORAL 2 TIMES DAILY
Qty: 180 TABLET | Refills: 0 | Status: SHIPPED | OUTPATIENT
Start: 2025-04-14

## 2025-04-14 RX ORDER — TRAMADOL HYDROCHLORIDE 50 MG/1
TABLET ORAL
Qty: 42 TABLET | OUTPATIENT
Start: 2025-04-14

## 2025-05-12 DIAGNOSIS — Z87.42 HISTORY OF POSTMENOPAUSAL BLEEDING: ICD-10-CM

## 2025-05-12 DIAGNOSIS — Z79.810 LONG-TERM CURRENT USE OF TAMOXIFEN: ICD-10-CM

## 2025-05-12 DIAGNOSIS — Z91.89 AT HIGH RISK FOR BREAST CANCER: ICD-10-CM

## 2025-05-12 DIAGNOSIS — E66.01 MORBID OBESITY WITH BMI OF 40.0-44.9, ADULT (HCC): ICD-10-CM

## 2025-05-12 RX ORDER — TAMOXIFEN CITRATE 20 MG/1
20 TABLET ORAL DAILY
Qty: 30 TABLET | Refills: 2 | Status: SHIPPED | OUTPATIENT
Start: 2025-05-12 | End: 2025-11-08

## 2025-06-09 ENCOUNTER — HOSPITAL ENCOUNTER (OUTPATIENT)
Age: 65
Setting detail: SPECIMEN
Discharge: HOME | End: 2025-06-09
Payer: COMMERCIAL

## 2025-06-09 ENCOUNTER — TELEPHONE (OUTPATIENT)
Age: 65
End: 2025-06-09

## 2025-06-09 ENCOUNTER — OFFICE VISIT (OUTPATIENT)
Age: 65
End: 2025-06-09
Payer: COMMERCIAL

## 2025-06-09 VITALS
RESPIRATION RATE: 16 BRPM | SYSTOLIC BLOOD PRESSURE: 129 MMHG | HEART RATE: 69 BPM | BODY MASS INDEX: 41.03 KG/M2 | HEIGHT: 70 IN | DIASTOLIC BLOOD PRESSURE: 74 MMHG | WEIGHT: 286.6 LBS | TEMPERATURE: 98.4 F

## 2025-06-09 DIAGNOSIS — Z79.810 LONG-TERM CURRENT USE OF TAMOXIFEN: ICD-10-CM

## 2025-06-09 DIAGNOSIS — Z91.89 AT HIGH RISK FOR BREAST CANCER: Primary | ICD-10-CM

## 2025-06-09 DIAGNOSIS — Z91.89 AT HIGH RISK FOR BREAST CANCER: ICD-10-CM

## 2025-06-09 DIAGNOSIS — Z87.42 HISTORY OF POSTMENOPAUSAL BLEEDING: ICD-10-CM

## 2025-06-09 LAB
ALBUMIN SERPL-MCNC: 3.8 G/DL (ref 3.5–5.2)
ALBUMIN/GLOB SERPL: 1.2 {RATIO} (ref 1–2.5)
ALP SERPL-CCNC: 66 U/L (ref 35–104)
ALT SERPL-CCNC: 13 U/L (ref 10–35)
ANION GAP SERPL CALCULATED.3IONS-SCNC: 9 MMOL/L (ref 9–16)
AST SERPL-CCNC: 16 U/L (ref 10–35)
BASOPHILS # BLD: 0.08 K/UL (ref 0–0.2)
BASOPHILS NFR BLD: 1 % (ref 0–2)
BILIRUB SERPL-MCNC: 0.7 MG/DL (ref 0–1.2)
BUN SERPL-MCNC: 12 MG/DL (ref 8–23)
CALCIUM SERPL-MCNC: 9.5 MG/DL (ref 8.8–10.2)
CHLORIDE SERPL-SCNC: 105 MMOL/L (ref 98–107)
CO2 SERPL-SCNC: 28 MMOL/L (ref 20–31)
CREAT SERPL-MCNC: 1 MG/DL (ref 0.5–0.9)
EOSINOPHIL # BLD: 0.35 K/UL (ref 0–0.44)
EOSINOPHILS RELATIVE PERCENT: 6 % (ref 1–4)
ERYTHROCYTE [DISTWIDTH] IN BLOOD BY AUTOMATED COUNT: 13.3 % (ref 11.8–14.4)
GFR, ESTIMATED: 66 ML/MIN/1.73M2
GLUCOSE SERPL-MCNC: 115 MG/DL (ref 82–115)
HCT VFR BLD AUTO: 41 % (ref 36.3–47.1)
HGB BLD-MCNC: 13.7 G/DL (ref 11.9–15.1)
IMM GRANULOCYTES # BLD AUTO: 0.02 K/UL (ref 0–0.3)
IMM GRANULOCYTES NFR BLD: 0 %
LYMPHOCYTES NFR BLD: 2.04 K/UL (ref 1.1–3.7)
LYMPHOCYTES RELATIVE PERCENT: 35 % (ref 24–43)
MCH RBC QN AUTO: 29.5 PG (ref 25.2–33.5)
MCHC RBC AUTO-ENTMCNC: 33.4 G/DL (ref 28.4–34.8)
MCV RBC AUTO: 88.2 FL (ref 82.6–102.9)
MONOCYTES NFR BLD: 0.58 K/UL (ref 0.1–1.2)
MONOCYTES NFR BLD: 10 % (ref 3–12)
NEUTROPHILS NFR BLD: 48 % (ref 36–65)
NEUTS SEG NFR BLD: 2.78 K/UL (ref 1.5–8.1)
NRBC BLD-RTO: 0 PER 100 WBC
PLATELET # BLD AUTO: 247 K/UL (ref 138–453)
PMV BLD AUTO: 9.2 FL (ref 8.1–13.5)
POTASSIUM SERPL-SCNC: 4.1 MMOL/L (ref 3.7–5.3)
PROT SERPL-MCNC: 7 G/DL (ref 6.6–8.7)
RBC # BLD AUTO: 4.65 M/UL (ref 3.95–5.11)
SODIUM SERPL-SCNC: 142 MMOL/L (ref 136–145)
WBC OTHER # BLD: 5.9 K/UL (ref 3.5–11.3)

## 2025-06-09 PROCEDURE — G8427 DOCREV CUR MEDS BY ELIG CLIN: HCPCS | Performed by: INTERNAL MEDICINE

## 2025-06-09 PROCEDURE — 1123F ACP DISCUSS/DSCN MKR DOCD: CPT | Performed by: INTERNAL MEDICINE

## 2025-06-09 PROCEDURE — 85025 COMPLETE CBC W/AUTO DIFF WBC: CPT

## 2025-06-09 PROCEDURE — G8417 CALC BMI ABV UP PARAM F/U: HCPCS | Performed by: INTERNAL MEDICINE

## 2025-06-09 PROCEDURE — G8400 PT W/DXA NO RESULTS DOC: HCPCS | Performed by: INTERNAL MEDICINE

## 2025-06-09 PROCEDURE — 1090F PRES/ABSN URINE INCON ASSESS: CPT | Performed by: INTERNAL MEDICINE

## 2025-06-09 PROCEDURE — 3074F SYST BP LT 130 MM HG: CPT | Performed by: INTERNAL MEDICINE

## 2025-06-09 PROCEDURE — 3078F DIAST BP <80 MM HG: CPT | Performed by: INTERNAL MEDICINE

## 2025-06-09 PROCEDURE — 36415 COLL VENOUS BLD VENIPUNCTURE: CPT

## 2025-06-09 PROCEDURE — 3017F COLORECTAL CA SCREEN DOC REV: CPT | Performed by: INTERNAL MEDICINE

## 2025-06-09 PROCEDURE — 80053 COMPREHEN METABOLIC PANEL: CPT

## 2025-06-09 PROCEDURE — 1036F TOBACCO NON-USER: CPT | Performed by: INTERNAL MEDICINE

## 2025-06-09 PROCEDURE — 99214 OFFICE O/P EST MOD 30 MIN: CPT | Performed by: INTERNAL MEDICINE

## 2025-06-09 PROCEDURE — 99213 OFFICE O/P EST LOW 20 MIN: CPT | Performed by: INTERNAL MEDICINE

## 2025-06-09 NOTE — PROGRESS NOTES
Chief Complaint   Patient presents with    Follow-up     INTERVAL HISTORY:  Patient presents to the clinic for a follow-up visit.  Patient last seen in office in March 2024.  Patient has been taking tamoxifen since.  Has not had a follow-up with gynecology team for pelvic exam.  Denies any vaginal bleeding.  Last mammogram was back in June 2020 for 6-month follow-up was recommended. Denies noticing any mass or lump in the breast.  During this visit patient's allergy, social, medical, surgical history and medications were reviewed and updated.    BRIEF CASE HISTORY: Patient is a very pleasant 65 y.o. female who is referred to us for consultation for the high risk  breast cancer clinic. She is evaluated by myself and the genetic counselor.  She does not have any personal history of breast cancer but she has significant family history.  She underwent screening mammogram and during the screen, she took the [facility] high risk questionnaire.  The questionnaire showed that her lifetime risk of breast cancer is 26.6%  which falls into the high risk category.  She is referred to us for counseling, discussion of risk reduction modalities as well as genetic evaluation.      Patient also has history of post menstrual bleeding.  Previously she has had ultrasound of pelvis done which showed fibroids.  Lately she has not followed up with a gynecologist.  At present she does not have any post menstrual bleeding.    GYN history     Breast density average, menarche 11 years of age, first live birth 27 years, menopause 54 years, hysterectomy 19, breast biopsy 2/10/2022 came back as fibroadenoma.  Last mammogram January 2022.    PAST MEDICAL HISTORY:  has a past medical history of Abnormal uterine bleeding (AUB), Arthritis, Awareness under anesthesia, Diabetes mellitus (HCC), Family history of breast cancer, GERD (gastroesophageal reflux disease), Hayfever, Hyperlipidemia, Hyperplastic polyp of intestine, Hypertension,

## 2025-06-15 DIAGNOSIS — E11.9 TYPE 2 DIABETES MELLITUS WITHOUT COMPLICATION, WITHOUT LONG-TERM CURRENT USE OF INSULIN (HCC): ICD-10-CM

## 2025-06-15 DIAGNOSIS — H93.8X3 EAR FULLNESS, BILATERAL: ICD-10-CM

## 2025-06-17 RX ORDER — FLASH GLUCOSE SENSOR
KIT MISCELLANEOUS
Qty: 6 EACH | Refills: 3 | Status: SHIPPED | OUTPATIENT
Start: 2025-06-17

## 2025-06-17 RX ORDER — CETIRIZINE HYDROCHLORIDE 10 MG/1
10 TABLET ORAL DAILY
Qty: 30 TABLET | Refills: 2 | Status: SHIPPED | OUTPATIENT
Start: 2025-06-17

## 2025-06-28 ENCOUNTER — HOSPITAL ENCOUNTER (OUTPATIENT)
Dept: MAMMOGRAPHY | Age: 65
Discharge: HOME | End: 2025-06-30
Attending: INTERNAL MEDICINE
Payer: COMMERCIAL

## 2025-06-28 DIAGNOSIS — Z91.89 AT HIGH RISK FOR BREAST CANCER: ICD-10-CM

## 2025-06-28 DIAGNOSIS — Z87.42 HISTORY OF POSTMENOPAUSAL BLEEDING: ICD-10-CM

## 2025-06-28 DIAGNOSIS — Z79.810 LONG-TERM CURRENT USE OF TAMOXIFEN: ICD-10-CM

## 2025-06-28 PROCEDURE — 77063 BREAST TOMOSYNTHESIS BI: CPT

## 2025-07-07 ENCOUNTER — TELEPHONE (OUTPATIENT)
Age: 65
End: 2025-07-07

## 2025-07-07 ENCOUNTER — HOSPITAL ENCOUNTER (OUTPATIENT)
Facility: MEDICAL CENTER | Age: 65
End: 2025-07-07

## 2025-07-07 ENCOUNTER — OFFICE VISIT (OUTPATIENT)
Age: 65
End: 2025-07-07
Payer: COMMERCIAL

## 2025-07-07 VITALS
SYSTOLIC BLOOD PRESSURE: 131 MMHG | RESPIRATION RATE: 16 BRPM | DIASTOLIC BLOOD PRESSURE: 74 MMHG | TEMPERATURE: 97.4 F | HEART RATE: 70 BPM | BODY MASS INDEX: 42.53 KG/M2 | WEIGHT: 292.2 LBS

## 2025-07-07 DIAGNOSIS — Z91.89 AT HIGH RISK FOR BREAST CANCER: Primary | ICD-10-CM

## 2025-07-07 DIAGNOSIS — E66.01 MORBID OBESITY WITH BMI OF 40.0-44.9, ADULT (HCC): ICD-10-CM

## 2025-07-07 DIAGNOSIS — Z87.42 HISTORY OF POSTMENOPAUSAL BLEEDING: ICD-10-CM

## 2025-07-07 DIAGNOSIS — Z79.810 LONG-TERM CURRENT USE OF TAMOXIFEN: ICD-10-CM

## 2025-07-07 PROCEDURE — 99213 OFFICE O/P EST LOW 20 MIN: CPT | Performed by: INTERNAL MEDICINE

## 2025-07-07 RX ORDER — TAMOXIFEN CITRATE 20 MG/1
20 TABLET ORAL DAILY
Qty: 90 TABLET | Refills: 1 | Status: SHIPPED | OUTPATIENT
Start: 2025-07-07 | End: 2026-01-03

## 2025-07-07 NOTE — TELEPHONE ENCOUNTER
NITA HERE FOR MD VISIT  Mri breast in dec   Rv after mri breast   MRI BREAST PT WILL CALL AND HAVE IT SCHEDULED WAS GIVEN OUR OFFICE PHONE NUMBER TO SCHEDULE A F/U APPT W/ DR PEDRITO DOWNS VISIT TBD  AVS PRINTED W/ INSTRUCTIONS AND GIVEN TO PT ON EXIT

## 2025-07-19 NOTE — PROGRESS NOTES
Chief Complaint   Patient presents with    Follow-up    Discuss Labs    Results     Mammogram    Medication Refill     INTERVAL HISTORY:  History of Present Illness  The patient presents for a follow-up consultation. She reports no new health concerns since her previous visit one month ago. She has undergone magnetic resonance imaging (MRI) and mammography, and her laboratory results have been reviewed. The patient is currently on a daily regimen of tamoxifen. She reports experiencing mastalgia, which she associates with caffeine consumption.    FAMILY HISTORY  Her sister has a recent brain finding and is scheduled for a biopsy.    During this visit patient's allergy, social, medical, surgical history and medications were reviewed and updated.    BRIEF CASE HISTORY: Patient is a very pleasant 65 y.o. female who is referred to us for consultation for the high risk  breast cancer clinic. She is evaluated by myself and the genetic counselor.  She does not have any personal history of breast cancer but she has significant family history.  She underwent screening mammogram and during the screen, she took the [facility] high risk questionnaire.  The questionnaire showed that her lifetime risk of breast cancer is 26.6%  which falls into the high risk category.  She is referred to us for counseling, discussion of risk reduction modalities as well as genetic evaluation.      Patient also has history of post menstrual bleeding.  Previously she has had ultrasound of pelvis done which showed fibroids.  Lately she has not followed up with a gynecologist.  At present she does not have any post menstrual bleeding.    GYN history     Breast density average, menarche 11 years of age, first live birth 27 years, menopause 54 years, hysterectomy 19, breast biopsy 2/10/2022 came back as fibroadenoma.  Last mammogram January 2022.    PAST MEDICAL HISTORY:  has a past medical history of Abnormal uterine bleeding (AUB), Arthritis,

## 2025-08-17 DIAGNOSIS — E11.9 TYPE 2 DIABETES MELLITUS WITHOUT COMPLICATION, WITHOUT LONG-TERM CURRENT USE OF INSULIN (HCC): ICD-10-CM

## 2025-08-18 RX ORDER — ACYCLOVIR 800 MG/1
TABLET ORAL
Qty: 6 EACH | Refills: 3 | Status: SHIPPED | OUTPATIENT
Start: 2025-08-18

## (undated) DEVICE — GLOVE SURG SZ 6 THK91MIL LTX FREE SYN POLYISOPRENE ANTI

## (undated) DEVICE — BASIN EMSIS 700ML GRAPHITE PLAS KID SHP GRAD

## (undated) DEVICE — TRAP SURG QUAD PARABOLA SLOT DSGN SFTY SCRN TRAPEASE

## (undated) DEVICE — GAUZE,SPONGE,4"X4",16PLY,STRL,LF,10/TRAY: Brand: MEDLINE

## (undated) DEVICE — BITEBLOCK ENDOSCP 60FR MAXI WHT POLYETH STURDY W/ VELC WVN

## (undated) DEVICE — CUP MED 1OZ CLR POLYPR FEED GRAD W/O LID

## (undated) DEVICE — SHEET,DRAPE,70X100,STERILE: Brand: MEDLINE

## (undated) DEVICE — MEDICINE CUP, GRADUATED, STER: Brand: MEDLINE

## (undated) DEVICE — ADAPTER TBNG LUER STUB 15 GA INTMED

## (undated) DEVICE — Device: Brand: DEFENDO VALVE AND CONNECTOR KIT

## (undated) DEVICE — SYRINGE MED 50ML LUERLOCK TIP

## (undated) DEVICE — SNARE ENDOSCP M L240CM LOOP W27MM SHTH DIA2.4MM OVL FLX

## (undated) DEVICE — NEEDLE SPNL L4.5IN OD22GA QNCKE TYP SPINOCAN

## (undated) DEVICE — SOLUTION IV 1000ML 0.9% SOD CHL PH 5 INJ USP VIAFLX PLAS

## (undated) DEVICE — GOWN,AURORA,NONREINFORCED,LARGE: Brand: MEDLINE

## (undated) DEVICE — FORCEPS BX L240CM JAW DIA2.2MM RAD JAW 4 HOT DISP

## (undated) DEVICE — HYSTEROSCOPE RIGID CORAL AVETA DISP

## (undated) DEVICE — TUBING, SUCTION, 1/4" X 12', STRAIGHT: Brand: MEDLINE

## (undated) DEVICE — SOLUTION SCRB 4OZ 2% CHG FOR SURG SCRBBING HND WSH

## (undated) DEVICE — COVER OR TBL W40XL90IN ABSRB STD AND GRIPPY BK SAHARA

## (undated) DEVICE — SYSTEM WST MGMT W/ CAP AVETA

## (undated) DEVICE — JELLY,LUBE,STERILE,FLIP TOP,TUBE,2-OZ: Brand: MEDLINE

## (undated) DEVICE — FORCEPS BX L240CM WRK CHN 2.8MM STD CAP W/ NDL MIC MESH

## (undated) DEVICE — GLOVE ORANGE PI 7   MSG9070

## (undated) DEVICE — CO2 CANNULA,SUPERSOFT, ADLT,7'O2,7'CO2: Brand: MEDLINE

## (undated) DEVICE — SIX SHOOTER SAEED MULTI-BAND LIGATOR: Brand: SAEED

## (undated) DEVICE — ELECTRODE PT RET AD L9FT HI MOIST COND ADH HYDRGEL CORDED

## (undated) DEVICE — TUBING, SUCTION, 9/32" X 20', STRAIGHT: Brand: MEDLINE INDUSTRIES, INC.

## (undated) DEVICE — SVMMC GYN MIN PK

## (undated) DEVICE — STRAP,POSITIONING,KNEE/BODY,FOAM,4X60": Brand: MEDLINE

## (undated) DEVICE — GLOVE SURG SZ 65 L12IN FNGR THK79MIL GRN LTX FREE

## (undated) DEVICE — GARMENT,MEDLINE,DVT,INT,CALF,MED, GEN2: Brand: MEDLINE

## (undated) DEVICE — PAD PT POS 36 IN SURGYPAD DISP

## (undated) DEVICE — COVER LT HNDL BLU PLAS

## (undated) DEVICE — SYRINGE,CONTROL,LL,FINGER,GRIP: Brand: MEDLINE INDUSTRIES, INC.

## (undated) DEVICE — GOWN POLY REINF SONT XLG: Brand: MEDLINE INDUSTRIES, INC.

## (undated) DEVICE — YANKAUER,FLEXIBLE HANDLE,REGLR CAPACITY: Brand: MEDLINE INDUSTRIES, INC.

## (undated) DEVICE — GLOVE SURG SZ 6 CRM LTX FREE POLYISOPRENE POLYMER BEAD ANTI

## (undated) DEVICE — SYSTEM WST MGMT AVETA

## (undated) DEVICE — 1016 S-DRAPE IRRIG POUCH 10/BOX: Brand: STERI-DRAPE™